# Patient Record
Sex: FEMALE | Race: WHITE | Employment: UNEMPLOYED | URBAN - METROPOLITAN AREA
[De-identification: names, ages, dates, MRNs, and addresses within clinical notes are randomized per-mention and may not be internally consistent; named-entity substitution may affect disease eponyms.]

---

## 2017-12-26 PROBLEM — Z34.01 ENCOUNTER FOR SUPERVISION OF NORMAL FIRST PREGNANCY IN FIRST TRIMESTER: Status: ACTIVE | Noted: 2017-12-26

## 2018-04-17 PROBLEM — O26.843 UTERINE SIZE DATE DISCREPANCY PREGNANCY, THIRD TRIMESTER: Status: ACTIVE | Noted: 2018-04-17

## 2018-07-03 ENCOUNTER — ANESTHESIA (OUTPATIENT)
Dept: LABOR AND DELIVERY | Age: 28
End: 2018-07-03
Payer: COMMERCIAL

## 2018-07-03 ENCOUNTER — ANESTHESIA EVENT (OUTPATIENT)
Dept: LABOR AND DELIVERY | Age: 28
End: 2018-07-03
Payer: COMMERCIAL

## 2018-07-03 ENCOUNTER — HOSPITAL ENCOUNTER (INPATIENT)
Age: 28
LOS: 3 days | Discharge: HOME | End: 2018-07-06
Attending: OBSTETRICS & GYNECOLOGY | Admitting: OBSTETRICS & GYNECOLOGY
Payer: COMMERCIAL

## 2018-07-03 PROBLEM — Z37.9 NORMAL LABOR: Status: ACTIVE | Noted: 2018-07-03

## 2018-07-03 LAB
ERYTHROCYTE [DISTWIDTH] IN BLOOD BY AUTOMATED COUNT: 15.5 % (ref 11.9–14.6)
HCT VFR BLD AUTO: 35.5 % (ref 35.8–46.3)
HGB BLD-MCNC: 11.2 G/DL (ref 11.7–15.4)
MCH RBC QN AUTO: 24.6 PG (ref 26.1–32.9)
MCHC RBC AUTO-ENTMCNC: 31.5 G/DL (ref 31.4–35)
MCV RBC AUTO: 77.9 FL (ref 79.6–97.8)
PLATELET # BLD AUTO: 184 K/UL (ref 150–450)
PMV BLD AUTO: 11.3 FL (ref 10.8–14.1)
RBC # BLD AUTO: 4.56 M/UL (ref 4.05–5.25)
WBC # BLD AUTO: 14 K/UL (ref 4.3–11.1)

## 2018-07-03 PROCEDURE — 86900 BLOOD TYPING SEROLOGIC ABO: CPT | Performed by: OBSTETRICS & GYNECOLOGY

## 2018-07-03 PROCEDURE — A4300 CATH IMPL VASC ACCESS PORTAL: HCPCS | Performed by: NURSE ANESTHETIST, CERTIFIED REGISTERED

## 2018-07-03 PROCEDURE — 77030014125 HC TY EPDRL BBMI -B: Performed by: NURSE ANESTHETIST, CERTIFIED REGISTERED

## 2018-07-03 PROCEDURE — 74011250636 HC RX REV CODE- 250/636

## 2018-07-03 PROCEDURE — 85027 COMPLETE CBC AUTOMATED: CPT | Performed by: OBSTETRICS & GYNECOLOGY

## 2018-07-03 PROCEDURE — 4A0HXCZ MEASUREMENT OF PRODUCTS OF CONCEPTION, CARDIAC RATE, EXTERNAL APPROACH: ICD-10-PCS | Performed by: OBSTETRICS & GYNECOLOGY

## 2018-07-03 PROCEDURE — 74011000250 HC RX REV CODE- 250

## 2018-07-03 PROCEDURE — 77030007880 HC KT SPN EPDRL BBMI -B: Performed by: NURSE ANESTHETIST, CERTIFIED REGISTERED

## 2018-07-03 PROCEDURE — 65270000029 HC RM PRIVATE

## 2018-07-03 RX ORDER — LIDOCAINE HYDROCHLORIDE 10 MG/ML
1 INJECTION INFILTRATION; PERINEURAL
Status: DISCONTINUED | OUTPATIENT
Start: 2018-07-03 | End: 2018-07-04 | Stop reason: HOSPADM

## 2018-07-03 RX ORDER — OXYTOCIN/0.9 % SODIUM CHLORIDE 15/250 ML
250 PLASTIC BAG, INJECTION (ML) INTRAVENOUS ONCE
Status: DISCONTINUED | OUTPATIENT
Start: 2018-07-04 | End: 2018-07-04

## 2018-07-03 RX ORDER — SODIUM CHLORIDE 0.9 % (FLUSH) 0.9 %
5-10 SYRINGE (ML) INJECTION AS NEEDED
Status: DISCONTINUED | OUTPATIENT
Start: 2018-07-03 | End: 2018-07-04

## 2018-07-03 RX ORDER — SODIUM CHLORIDE 0.9 % (FLUSH) 0.9 %
5-10 SYRINGE (ML) INJECTION EVERY 8 HOURS
Status: DISCONTINUED | OUTPATIENT
Start: 2018-07-04 | End: 2018-07-04

## 2018-07-03 RX ORDER — ONDANSETRON 2 MG/ML
4 INJECTION INTRAMUSCULAR; INTRAVENOUS
Status: DISCONTINUED | OUTPATIENT
Start: 2018-07-03 | End: 2018-07-04 | Stop reason: HOSPADM

## 2018-07-03 RX ORDER — MINERAL OIL
120 OIL (ML) ORAL
Status: COMPLETED | OUTPATIENT
Start: 2018-07-03 | End: 2018-07-04

## 2018-07-03 RX ORDER — DEXTROSE, SODIUM CHLORIDE, SODIUM LACTATE, POTASSIUM CHLORIDE, AND CALCIUM CHLORIDE 5; .6; .31; .03; .02 G/100ML; G/100ML; G/100ML; G/100ML; G/100ML
125 INJECTION, SOLUTION INTRAVENOUS CONTINUOUS
Status: DISCONTINUED | OUTPATIENT
Start: 2018-07-04 | End: 2018-07-04

## 2018-07-03 RX ORDER — BUTORPHANOL TARTRATE 1 MG/ML
1 INJECTION INTRAMUSCULAR; INTRAVENOUS
Status: DISCONTINUED | OUTPATIENT
Start: 2018-07-03 | End: 2018-07-04 | Stop reason: HOSPADM

## 2018-07-03 RX ORDER — LIDOCAINE HYDROCHLORIDE 20 MG/ML
JELLY TOPICAL
Status: DISCONTINUED | OUTPATIENT
Start: 2018-07-03 | End: 2018-07-04 | Stop reason: HOSPADM

## 2018-07-03 RX ORDER — FENTANYL CITRATE 50 UG/ML
INJECTION, SOLUTION INTRAMUSCULAR; INTRAVENOUS
Status: COMPLETED
Start: 2018-07-03 | End: 2018-07-03

## 2018-07-03 RX ADMIN — FENTANYL CITRATE 15 MCG: 50 INJECTION, SOLUTION INTRAMUSCULAR; INTRAVENOUS at 23:59

## 2018-07-03 NOTE — IP AVS SNAPSHOT
Armaan Wagner 58 [address] 
[phone] Patient: Basil Pinto MRN: [id number] VI About your hospitalization You were admitted on:  July 3, 2018 You last received care in the:  [address] [facility] You were discharged on:  2018 Why you were hospitalized Your primary diagnosis was:  Not on File Your diagnoses also included:  Normal Labor Follow-up Information Follow up With Details Comments Contact Info None   None ([phone]) Patient stated that they have no PCP Mehrdad Ellis MD Schedule an appointment as soon as possible for a visit in 6 weeks  [facility] [address] [facility] [address] 
[phone] Discharge Orders None A check sandra indicates which time of day the medication should be taken. My Medications CONTINUE taking these medications Instructions Each Dose to Equal  
 Morning Noon Evening Bedtime PRENATAL DHA+COMPLETE PRENATAL -300 mg-mcg-mg Cmpk Generic drug:  [id number] omar-folic-dha Your last dose was: Your next dose is: Take  by mouth. VITAMIN B-6 25 mg tablet Generic drug:  pyridoxine (vitamin B6) Your last dose was: Your next dose is: Take 25 mg by mouth daily. 25 mg Discharge Instructions Discharge instruction to follow: Activity: Pelvis rest for 6 weeks No heavy lifting over 15 lbs for 2 weeks No driving for 2 weeks No push/pull motion such as sweeping or vacuuming for 2 weeks No tub baths for 6 weeks Continue using the hygenique wand after each void or bowel movement. If using sitz bath continue until comfortable stopping. If using anthony-bottle continue to use until comfortable stopping. Change sanitary pad after each urination or bowel movement. Call MD for the following: Fever over 101 F; pain not relieved by medication; foul smelling vaginal discharge or an increase in vaginal bleeding. Take medication as prescribed. Follow up with MD as order. After Your Delivery (the Postpartum Period): Care Instructions Your Care Instructions Congratulations on the birth of your baby. Like pregnancy, the  period can be a time of excitement, mohamud, and exhaustion. You may look at your wondrous little baby and feel happy. You may also be overwhelmed by your new sleep hours and new responsibilities. At first, babies often sleep during the days and are awake at night. They do not have a pattern or routine. They may make sudden gasps, jerk themselves awake, or look like they have crossed eyes. These are all normal, and they may even make you smile. In these first weeks after delivery, try to take good care of yourself. It may take 4 to 6 weeks to feel like yourself again, and possibly longer if you had a  birth. You will likely feel very tired for several weeks. Your days will be full of ups and downs, but lots of mohamud as well. Follow-up care is a key part of your treatment and safety. Be sure to make and go to all appointments, and call your doctor if you are having problems. It's also a good idea to know your test results and keep a list of the medicines you take. How can you care for yourself at home? Take care of your body after delivery · Use pads instead of tampons for the bloody flow that may last as long as 2 weeks. · Ease cramps with ibuprofen (Advil, Motrin). · Ease soreness of hemorrhoids and the area between your vagina and rectum with ice compresses or witch hazel pads. · Ease constipation by drinking lots of fluid and eating high-fiber foods. Ask your doctor about over-the-counter stool softeners. · Cleanse yourself with a gentle squeeze of warm water from a bottle instead of wiping with toilet paper. · Take a sitz bath in warm water several times a day. · Wear a good nursing bra. Ease sore and swollen breasts with warm, wet washcloths. · If you are not breastfeeding, use ice rather than heat for breast soreness. · Your period may not start for several months if you are breastfeeding. You may bleed more, and longer at first, than you did before you got pregnant. · Wait until you are healed (about 4 to 6 weeks) before you have sexual intercourse. Your doctor will tell you when it is okay to have sex. · Try not to travel with your baby for 5 or 6 weeks. If you take a long car trip, make frequent stops to walk around and stretch. Avoid exhaustion · Rest every day. Try to nap when your baby naps. · Ask another adult to be with you for a few days after delivery. · Plan for  if you have other children. · Stay flexible so you can eat at odd hours and sleep when you need to. Both you and your baby are making new schedules. · Plan small trips to get out of the house. Change can make you feel less tired. · Ask for help with housework, cooking, and shopping. Remind yourself that your job is to care for your baby. Know about help for postpartum depression · \"Baby blues\" are common for the first 1 to 2 weeks after birth. You may cry or feel sad or irritable for no reason. · Rest whenever you can. Being tired makes it harder to handle your emotions. · Go for walks with your baby. · Talk to your partner, friends, and family about your feelings. · If your symptoms last for more than a few weeks, or if you feel very depressed, ask your doctor for help. · Postpartum depression can be treated. Support groups and counseling can help. Sometimes medicine can also help. Stay healthy · Eat healthy foods so you have more energy, make good breast milk, and lose extra baby pounds. · If you breastfeed, avoid alcohol and drugs. Stay smoke-free. If you quit during pregnancy, congratulations. · Start daily exercise after 4 to 6 weeks, but rest when you feel tired. · Learn exercises to tone your belly. Do Kegel exercises to regain strength in your pelvic muscles. You can do these exercises while you stand or sit. ¨ Squeeze the same muscles you would use to stop your urine. Your belly and thighs should not move. ¨ Hold the squeeze for 3 seconds, and then relax for 3 seconds. ¨ Start with 3 seconds. Then add 1 second each week until you are able to squeeze for 10 seconds. ¨ Repeat the exercise 10 to 15 times for each session. Do three or more sessions each day. · Find a class for new mothers and new babies that has an exercise time. · If you had a  birth, give yourself a bit more time before you exercise, and be careful. When should you call for help? Call 911 anytime you think you may need emergency care. For example, call if: 
? · You passed out (lost consciousness). ?Call your doctor now or seek immediate medical care if: 
? · You have severe vaginal bleeding. This means you are passing blood clots and soaking through a pad each hour for 2 or more hours. ? · You are dizzy or lightheaded, or you feel like you may faint. ? · You have a fever. ? · You have new belly pain, or your pain gets worse. ? Watch closely for changes in your health, and be sure to contact your doctor if: 
? · Your vaginal bleeding seems to be getting heavier. ? · You have new or worse vaginal discharge. ? · You feel sad, anxious, or hopeless for more than a few days. ? · You do not get better as expected. Where can you learn more? Go to http://angelia-eric.info/. Enter A461 in the search box to learn more about \"After Your Delivery (the Postpartum Period): Care Instructions. \" Current as of: 2017 Content Version: 11.4 © 4285-5320 [employer].  Care instructions adapted under license by [employer] (which disclaims liability or warranty for this information). If you have questions about a medical condition or this instruction, always ask your healthcare professional. [address] any warranty or liability for your use of this information. Z2 Announcement We are excited to announce that we are making your provider's discharge notes available to you in Z2. You will see these notes when they are completed and signed by the physician that discharged you from your recent hospital stay. If you have any questions or concerns about any information you see in Z2, please call the Health Information Department where you were seen or reach out to your Primary Care Provider for more information about your plan of care. Introducing [facility]! Deakelley Weiss: 
Thank you for requesting a Z2 account. Our records indicate that you already have an active Z2 account. You can access your account anytime at [email or web address] Did you know that you can access your hospital and ER discharge instructions at any time in Z2? You can also review all of your test results from your hospital stay or ER visit. Additional Information If you have questions, please visit the Frequently Asked Questions section of the Z2 website at [email or web address] Remember, Z2 is NOT to be used for urgent needs. For medical emergencies, dial 911. Now available from your iPhone and Android! Introducing Juan David Chou As a Terrence Henriquez patient, I wanted to make you aware of our electronic visit tool called Juan David Chou. Zainhira Griffinmilo 24/7 allows you to connect within minutes with a medical provider 24 hours a day, seven days a week via a mobile device or tablet or logging into a secure website from your computer. You can access Juan David Chou from anywhere in the [place]. A virtual visit might be right for you when you have a simple condition and feel like you just dont want to get out of bed, or cant get away from work for an appointment, when your regular [facility] provider is not available (evenings, weekends or holidays), or when youre out of town and need minor care. Electronic visits cost only $49 and if the [employer] 24/seedtag provider determines a prescription is needed to treat your condition, one can be electronically transmitted to a nearby pharmacy*. Please take a moment to enroll today if you have not already done so. The enrollment process is free and takes just a few minutes. To enroll, please download the Useful at Night yaniv to your tablet or phone, or visit [email or web address] to enroll on your computer. And, as an [address] [place] [address] patient with a [employer] account, the results of your visits will be scanned into your electronic medical record and your primary care provider will be able to view the scanned results. We urge you to continue to see your regular [facility] provider for your ongoing medical care. And while your primary care provider may not be the one available when you seek a Juan David Chou virtual visit, the peace of mind you get from getting a real diagnosis real time can be priceless. For more information on Juan David Chou, view our Frequently Asked Questions (FAQs) at [email or web address]. Sincerely, 
 
Dorene Payne MD 
Chief Medical Officer Juli Perla Martinez *:  certain medications cannot be prescribed via Juan David Chou Providers Seen During Your Hospitalization Provider Specialty Primary office phone Travon Donaldson MD Obstetrics & Gynecology [phone] Immunizations Administered for This Admission Name Date Tdap  Deferred () Your Primary Care Physician (PCP) Primary Care Physician Office Phone Office Fax  NONE ** None ** ** None **  
  
 You are allergic to the following Allergen Reactions Amoxicillin Hives Recent Documentation Height Breastfeeding? OB Status Smoking Status 1.727 m Unknown Recent pregnancy Never Smoker Emergency Contacts Name Discharge Info Relation Home Work Mobile Regi Guzman  Spouse [9] [phone] Patient Belongings The following personal items are in your possession at time of discharge: 
     Visual Aid: None             Clothing: Jacket/Coat, Pajamas, At bedside, Pants, Undergarments, Footwear, Shirt, With patient, Diann Pisano Please provide this summary of care documentation to your next provider. Signatures-by signing, you are acknowledging that this After Visit Summary has been reviewed with you and you have received a copy. Patient Signature:  ____________________________________________________________ Date:  ____________________________________________________________  
  
Bimal Deutsch Provider Signature:  ____________________________________________________________ Date:  ____________________________________________________________

## 2018-07-04 LAB
ABO + RH BLD: NORMAL
BLOOD GROUP ANTIBODIES SERPL: NORMAL
SPECIMEN EXP DATE BLD: NORMAL

## 2018-07-04 PROCEDURE — 77030009363 HC CUP VAC EXTRCT CNCI -B

## 2018-07-04 PROCEDURE — 75410000000 HC DELIVERY VAGINAL/SINGLE

## 2018-07-04 PROCEDURE — 75410000003 HC RECOV DEL/VAG/CSECN EA 0.5 HR: Performed by: OBSTETRICS & GYNECOLOGY

## 2018-07-04 PROCEDURE — 99283 EMERGENCY DEPT VISIT LOW MDM: CPT

## 2018-07-04 PROCEDURE — 77030018846 HC SOL IRR STRL H20 ICUM -A

## 2018-07-04 PROCEDURE — 65270000029 HC RM PRIVATE

## 2018-07-04 PROCEDURE — 75410000002 HC LABOR FEE PER 1 HR

## 2018-07-04 PROCEDURE — 0DQR0ZZ REPAIR ANAL SPHINCTER, OPEN APPROACH: ICD-10-PCS | Performed by: OBSTETRICS & GYNECOLOGY

## 2018-07-04 PROCEDURE — 77030003028 HC SUT VCRL J&J -A

## 2018-07-04 PROCEDURE — 74011250637 HC RX REV CODE- 250/637: Performed by: OBSTETRICS & GYNECOLOGY

## 2018-07-04 PROCEDURE — 36415 COLL VENOUS BLD VENIPUNCTURE: CPT | Performed by: OBSTETRICS & GYNECOLOGY

## 2018-07-04 PROCEDURE — 59025 FETAL NON-STRESS TEST: CPT

## 2018-07-04 PROCEDURE — 76060000078 HC EPIDURAL ANESTHESIA

## 2018-07-04 PROCEDURE — 84295 ASSAY OF SERUM SODIUM: CPT

## 2018-07-04 PROCEDURE — 77030011945 HC CATH URIN INT ST MENT -A

## 2018-07-04 PROCEDURE — 75410000003 HC RECOV DEL/VAG/CSECN EA 0.5 HR

## 2018-07-04 PROCEDURE — 82947 ASSAY GLUCOSE BLOOD QUANT: CPT

## 2018-07-04 PROCEDURE — 77010026065 HC OXYGEN MINIMUM MEDICAL AIR

## 2018-07-04 PROCEDURE — 82803 BLOOD GASES ANY COMBINATION: CPT

## 2018-07-04 RX ORDER — OXYTOCIN/0.9 % SODIUM CHLORIDE 15/250 ML
250 PLASTIC BAG, INJECTION (ML) INTRAVENOUS ONCE
Status: ACTIVE | OUTPATIENT
Start: 2018-07-04 | End: 2018-07-04

## 2018-07-04 RX ORDER — ZOLPIDEM TARTRATE 5 MG/1
10 TABLET ORAL
Status: DISCONTINUED | OUTPATIENT
Start: 2018-07-04 | End: 2018-07-04

## 2018-07-04 RX ORDER — HYDROCODONE BITARTRATE AND ACETAMINOPHEN 5; 325 MG/1; MG/1
1 TABLET ORAL
Status: DISCONTINUED | OUTPATIENT
Start: 2018-07-04 | End: 2018-07-07 | Stop reason: HOSPADM

## 2018-07-04 RX ORDER — POLYETHYLENE GLYCOL 3350 17 G/17G
17 POWDER, FOR SOLUTION ORAL DAILY
Status: DISCONTINUED | OUTPATIENT
Start: 2018-07-04 | End: 2018-07-07 | Stop reason: HOSPADM

## 2018-07-04 RX ORDER — DIPHENHYDRAMINE HCL 25 MG
25 CAPSULE ORAL
Status: DISCONTINUED | OUTPATIENT
Start: 2018-07-04 | End: 2018-07-07 | Stop reason: HOSPADM

## 2018-07-04 RX ORDER — DIPHENHYDRAMINE HCL 25 MG
50 CAPSULE ORAL
Status: DISCONTINUED | OUTPATIENT
Start: 2018-07-04 | End: 2018-07-07 | Stop reason: HOSPADM

## 2018-07-04 RX ORDER — IBUPROFEN 800 MG/1
800 TABLET ORAL
Status: DISCONTINUED | OUTPATIENT
Start: 2018-07-04 | End: 2018-07-07 | Stop reason: HOSPADM

## 2018-07-04 RX ORDER — ROPIVACAINE HYDROCHLORIDE 2 MG/ML
INJECTION, SOLUTION EPIDURAL; INFILTRATION; PERINEURAL
Status: DISCONTINUED | OUTPATIENT
Start: 2018-07-04 | End: 2018-07-04 | Stop reason: HOSPADM

## 2018-07-04 RX ORDER — FENTANYL CITRATE 50 UG/ML
INJECTION, SOLUTION INTRAMUSCULAR; INTRAVENOUS AS NEEDED
Status: DISCONTINUED | OUTPATIENT
Start: 2018-07-03 | End: 2018-07-04 | Stop reason: HOSPADM

## 2018-07-04 RX ORDER — SIMETHICONE 80 MG
80 TABLET,CHEWABLE ORAL
Status: DISCONTINUED | OUTPATIENT
Start: 2018-07-04 | End: 2018-07-07 | Stop reason: HOSPADM

## 2018-07-04 RX ORDER — DOCUSATE SODIUM 100 MG/1
100 CAPSULE, LIQUID FILLED ORAL 2 TIMES DAILY
Status: DISCONTINUED | OUTPATIENT
Start: 2018-07-04 | End: 2018-07-07 | Stop reason: HOSPADM

## 2018-07-04 RX ORDER — FENTANYL CITRATE 50 UG/ML
100 INJECTION, SOLUTION INTRAMUSCULAR; INTRAVENOUS ONCE
Status: DISCONTINUED | OUTPATIENT
Start: 2018-07-04 | End: 2018-07-04

## 2018-07-04 RX ORDER — LIDOCAINE HYDROCHLORIDE AND EPINEPHRINE 15; 5 MG/ML; UG/ML
INJECTION, SOLUTION EPIDURAL AS NEEDED
Status: DISCONTINUED | OUTPATIENT
Start: 2018-07-04 | End: 2018-07-04 | Stop reason: HOSPADM

## 2018-07-04 RX ORDER — ZOLPIDEM TARTRATE 5 MG/1
5 TABLET ORAL
Status: DISCONTINUED | OUTPATIENT
Start: 2018-07-04 | End: 2018-07-07 | Stop reason: HOSPADM

## 2018-07-04 RX ORDER — OXYTOCIN/RINGER'S LACTATE 15/250 ML
250 PLASTIC BAG, INJECTION (ML) INTRAVENOUS ONCE
Status: DISCONTINUED | OUTPATIENT
Start: 2018-07-04 | End: 2018-07-04 | Stop reason: SDUPTHER

## 2018-07-04 RX ORDER — NALOXONE HYDROCHLORIDE 0.4 MG/ML
0.4 INJECTION, SOLUTION INTRAMUSCULAR; INTRAVENOUS; SUBCUTANEOUS AS NEEDED
Status: DISCONTINUED | OUTPATIENT
Start: 2018-07-04 | End: 2018-07-07 | Stop reason: HOSPADM

## 2018-07-04 RX ORDER — DIPHENHYDRAMINE HCL 25 MG
25-50 CAPSULE ORAL
Status: DISCONTINUED | OUTPATIENT
Start: 2018-07-04 | End: 2018-07-04

## 2018-07-04 RX ORDER — HYDROCODONE BITARTRATE AND ACETAMINOPHEN 5; 325 MG/1; MG/1
2 TABLET ORAL
Status: DISCONTINUED | OUTPATIENT
Start: 2018-07-04 | End: 2018-07-07 | Stop reason: HOSPADM

## 2018-07-04 RX ADMIN — Medication 1 SPRAY: at 09:56

## 2018-07-04 RX ADMIN — DOCUSATE SODIUM 100 MG: 100 CAPSULE, LIQUID FILLED ORAL at 18:05

## 2018-07-04 RX ADMIN — HYDROCODONE BITARTRATE AND ACETAMINOPHEN 1 TABLET: 5; 325 TABLET ORAL at 23:06

## 2018-07-04 RX ADMIN — WITCH HAZEL 1 PAD: 500 SOLUTION RECTAL; TOPICAL at 09:56

## 2018-07-04 RX ADMIN — DOCUSATE SODIUM 100 MG: 100 CAPSULE, LIQUID FILLED ORAL at 09:57

## 2018-07-04 RX ADMIN — MINERAL OIL 120 ML: 471.95 OIL ORAL at 04:29

## 2018-07-04 RX ADMIN — IBUPROFEN 800 MG: 800 TABLET ORAL at 09:19

## 2018-07-04 RX ADMIN — ROPIVACAINE HYDROCHLORIDE 8 ML/HR: 2 INJECTION, SOLUTION EPIDURAL; INFILTRATION; PERINEURAL at 00:00

## 2018-07-04 RX ADMIN — FENTANYL CITRATE 85 MCG: 50 INJECTION, SOLUTION INTRAMUSCULAR; INTRAVENOUS at 00:00

## 2018-07-04 RX ADMIN — HYDROCODONE BITARTRATE AND ACETAMINOPHEN 1 TABLET: 5; 325 TABLET ORAL at 09:20

## 2018-07-04 RX ADMIN — IBUPROFEN 800 MG: 800 TABLET ORAL at 15:00

## 2018-07-04 RX ADMIN — LIDOCAINE HYDROCHLORIDE AND EPINEPHRINE 5 ML: 15; 5 INJECTION, SOLUTION EPIDURAL at 00:00

## 2018-07-04 RX ADMIN — POLYETHYLENE GLYCOL (3350) 17 G: 17 POWDER, FOR SOLUTION ORAL at 09:57

## 2018-07-04 NOTE — ANESTHESIA POSTPROCEDURE EVALUATION
Post-Anesthesia Evaluation and Assessment    Patient: Amanda Corea MRN: [id number]  SSN: [id number]    YOB: 1990  Age: 33 y.o. Sex: female       Cardiovascular Function/Vital Signs  Visit Vitals    /64    Pulse 80    Temp 37.1 °C (98.8 °F)    Resp 20    Ht 5' 8\" (1.727 m)    Breastfeeding No       Patient is status post CSE anesthesia for * No procedures listed *. Nausea/Vomiting: None    Postoperative hydration reviewed and adequate. Pain:  Pain Scale 1: Numeric (0 - 10) (07/03/18 2238)  Pain Intensity 1: 6 (07/03/18 2238)   Managed    Neurological Status: At baseline    Mental Status and Level of Consciousness: Arousable    Pulmonary Status:   O2 Device: Room air (07/03/18 2238)   Adequate oxygenation and airway patent    Complications related to anesthesia: None    Post-anesthesia assessment completed.  No concerns    Signed By: Anthony Moses MD     July 4, 2018

## 2018-07-04 NOTE — PROGRESS NOTES
Patient visiting with family. Patient encouraged to call out to RN if she needs anything for pain, help with breast feeding, has any questions, or needs anything else the RN can help her with or bring her. Patient verbalized understanding.

## 2018-07-04 NOTE — PROGRESS NOTES
Patient continues to with contraction while MD pulls with vacuum x3 pulls. Will continue to assess fetal heart tones.  Sushant and respiratory therapist at bedside to evaluate infant

## 2018-07-04 NOTE — PROGRESS NOTES
Dr. She Delgado called updated on patient status and cervical exam; new orders received patient to labor down reassess within 30 minutes for cervical exam

## 2018-07-04 NOTE — PROGRESS NOTES
Assisted up to bathroom, voided 800   Pericare performed. Dermoplast spray, and tucks pads applied.   Ice pack and pad changed  Doing well, sitting in rocking chair with spouse at bs

## 2018-07-04 NOTE — PROGRESS NOTES
Patient doing well, pain has decreased  Infant placed skin to skin and breastfeeding initiated  Paperwork reviewed with patient and spouse.   Shaken baby syndrome signed

## 2018-07-04 NOTE — PROGRESS NOTES
RN called to bedside reports large gush of fluid. Perineum assessed SROM noted.  Patient repositioned to left lateral on peanut birthing ball

## 2018-07-04 NOTE — PROGRESS NOTES
07/04/18 1459   Pain   Pain Scale 1 Numeric (0 - 10)   Pain Intensity 1 4   Pain Location 1 Perineum   Pain Orientation 1 Lower   Pain Description 1 Sore   Pain Intervention(s) 1 Medication (see MAR)   Motrin given, see MAR  Family visiting at

## 2018-07-04 NOTE — L&D DELIVERY NOTE
Delivery Summary    Patient: Kenny Castellon MRN: [id number]  SSN: [id number]    YOB: 1990  Age: 33 y.o. Sex: female        Information for the patient's :  Drew Agarwal [[id number]]       Labor Events:    Labor: No   Rupture Date: 2018   Rupture Time: 2:45 AM   Rupture Type SROM   Amniotic Fluid Volume: None    Amniotic Fluid Description: Clear None   Induction: None       Augmentation: None   Labor Events:       Cervical Ripening:     None     Delivery Events:  Episiotomy: None   Laceration(s): Partial third degree perineal     Repaired: Yes    Number of Repair Packets: 2   Suture Type and Size: Vicryl 2-0  Vicryl 3-0     Estimated Blood Loss (ml): 400ml       Delivery Date: 2018    Delivery Time: 6:23 AM  Delivery Type: Vaginal, Vacuum (Extractor)  Vacuum attempted? No    Vacuum indication:     Vacuum type: Kiwi   Application location: Low   First Attempt     Time applied:     Time removed:     Second Attempt    Time applied:     Time removed: Third Attempt    Time applied:     Time removed:     Number of pulls: 2   Number of pop-offs: 0   Low-end pressure range: 25   High-end pressure range: 570   Total application time: 7 minutes   Applied by: Garima Isaacs   Failed? 0   Sex:   Male     Gestational Age: 44w2d  Delivery Clinician:  Sharmila Westbrook  Living Status: Living   Delivery Location: L&D           APGARS  One minute Five minutes Ten minutes   Skin color: 1   1        Heart rate: 2   2        Grimace: 2   2        Muscle tone: 2   2        Breathin   2        Totals: 9   9          Presentation: Vertex    Position: Right Occiput Posterior  Resuscitation Method:        Meconium Stained:        Cord Vessels: 3 Vessels      Cord Events:    Cord Blood Sent?:  Yes    Blood Gases Sent?:  Yes    Placenta:  Date/Time:   6:29 AM  Removal: Spontaneous      Appearance: Normal;Intact     [place]fur Measurements:  Birth Weight: 7 lb 11.5 oz (3.5 kg)      Birth Length:        Head Circumference: 35.5 cm      Chest Circumference: 33.5 cm     Abdominal Girth: Other Providers:   Nasreen DEY;BERNADETTE YIN;CONSTANTINE MANCERA;PRISCILLA CHANG;NICKI HANCOCK;ARON WARD., Obstetrician;Primary Nurse;Charge Nurse;Neonatologist;Respiratory Therapist;Staff Nurse     The indication, risks, and benefits of vacuum delivery compared to  delivery were discussed with the patient and attendant family member. All questions were answered. Instrumentation easily achieved and positioning was checked and verified prior to attempt at deliver. Group B Strep:   Lab Results   Component Value Date/Time    GrBStrep, External Negative 2018     Information for the patient's :  Maryelizabeth Curling [[id number]]   No results found for: Rajan Marley, PCTDIG, BILI, ABORHEXT, ABORH    No results found for: APH, APCO2, APO2, AHCO3, ABEC, ABDC, O2ST, EPHV, PCO2V, PO2V, HCO3V, EBEV, EBDV, SITE, RSCOM      Delivery Note      No results found for: APH, APCO2, APO2, AHCO3, ABEC, ABDC, O2ST, SITE, RSCOM         Lab Results   Component Value Date/Time    Rubella, External 1.53 11/15/2017    GrBStrep, External Negative 2018            Procedure:  Patient became completely dilated and pushed for 2hr 45 min. She became fatigued. Head was at 2-3+. Discussed vacuum and it was applied. Episiotomy was not performed. Vacuum applied at 616. Pressure increased to 500 with pulls. Released to yellow when not pulling. Two pulls total.   Head delivered. Shoulders delivered without any evidence of dystocia. Baby delivered in the bed, was dried. The cord was clamped and cut. Cord pH and cord blood was obtained. The baby was taken to warmer for nicu. The perineum was examined. She had a partial 3rd. Sphincter was repaired with 2.0 vicryl and then remainder closed in routine fashion with 3.0 vicryl. The placenta was delivered spontaneously. It was examined.   It was intact with three vessels. Mom and baby are doing well.          Bren Rosenberg MD  7/4/2018  6:54 AM

## 2018-07-04 NOTE — PROGRESS NOTES
RN at bedside cervical exam performed +2 station noted; pushing with contractions reviewed with patient

## 2018-07-04 NOTE — H&P
History & Physical    Name: Amanda Corea MRN: [id number]  SSN: [id number]    YOB: 1990  Age: 33 y.o. Sex: female        Subjective: Pt presents with uterine ctx. She denies VB, LOF, or preeclamptic symptoms. Pt notes good FM. Estimated Date of Delivery: 18  OB History    Para Term  AB Living   1        SAB TAB Ectopic Molar Multiple Live Births              # Outcome Date GA Lbr Yadiel/2nd Weight Sex Delivery Anes PTL Lv   1 Current                   Ms. Elroy Zaldivar is seen with pregnancy at 39w1d for active labor. Prenatal course was normal.  the patients states that the baby moves as usual   Please see prenatal records for details. No past medical history on file. Past Surgical History:   Procedure Laterality Date    HX WISDOM TEETH EXTRACTION  2017     Social History     Occupational History    Not on file. Social History Main Topics    Smoking status: Never Smoker    Smokeless tobacco: Never Used    Alcohol use No    Drug use: No    Sexual activity: Yes     Partners: Male     Birth control/ protection: None     Family History   Problem Relation Age of Onset    Migraines Father     Alzheimer Maternal Grandfather     Heart Disease Maternal Grandfather     Lung Disease Maternal Grandfather     Thyroid Disease Paternal Grandmother     Colon Cancer Neg Hx     Ovarian Cancer Neg Hx     Breast Cancer Neg Hx        Allergies   Allergen Reactions    Amoxicillin Hives     Prior to Admission medications    Medication Sig Start Date End Date Taking? Authorizing Provider   [id number] omar-folic-dha (PRENATAL DHA+COMPLETE PRENATAL) [id number] mg-mcg-mg cmpk Take  by mouth. Yes Historical Provider   pyridoxine, vitamin B6, (VITAMIN B-6) 25 mg tablet Take 25 mg by mouth daily.     Historical Provider        Review of Systems:  Constitutional:No headache, fever  Cardiac:   No chest pain      Resp: No cough or shortness of breath     GI:   No nausea/vomiting, diarrhea, abdominal pain    :   No dysuria  Neuro:     No vision changes, headache      Objective:     Vitals:  Vitals:    18 2237 18 2238   BP:  117/67   Pulse:  74   Resp:  20   Temp:  98.1 °F (36.7 °C)   Height: 5' 8\" (1.727 m)         Physical Exam:  Patient with distress. Heart: Regular rate and rhythm  Lung: clear to auscultation throughout lung fields, no wheezes, no rales, no rhonchi and normal respiratory effort  Back: costovertebral angle tenderness absent  Abdomen: soft, nontender  Fundus: soft and non tender  Cervical Exam: 7cm/C/vtx/0 station  Lower Extremities: no evidence of DVT  Membranes:  Intact  Fetal Heart Rate: Baseline: 155 per minute  Variability: moderate  Accelerations: yes  Decelerations: none  Uterine contractions: regular, every 2-3 minutes    Prenatal Labs:   Lab Results   Component Value Date/Time    Rubella, External 1.53 11/15/2017    GrBStrep, External Negative 2018    HBsAg, External Negative 11/15/2017    RPR, External Non Reactive 11/15/2017         Assessment/Plan:     Ms. Jaret Joe is a  seen with pregnancy at 39w1d for active labor. Lab Results   Component Value Date/Time    GrBStrep, External Negative 2018       Plan:     Admit for labor management    Patient discussed with Dr. Nicole Pablo.

## 2018-07-04 NOTE — ANESTHESIA PREPROCEDURE EVALUATION
Anesthetic History   No history of anesthetic complications            Review of Systems / Medical History  Patient summary reviewed and pertinent labs reviewed    Pulmonary  Within defined limits                 Neuro/Psych   Within defined limits           Cardiovascular  Within defined limits                Exercise tolerance: >4 METS     GI/Hepatic/Renal     GERD: well controlled           Endo/Other  Within defined limits           Other Findings              Physical Exam    Airway  Mallampati: II  TM Distance: 4 - 6 cm  Neck ROM: normal range of motion   Mouth opening: Normal     Cardiovascular  Regular rate and rhythm,  S1 and S2 normal,  no murmur, click, rub, or gallop             Dental         Pulmonary  Breath sounds clear to auscultation               Abdominal         Other Findings            Anesthetic Plan    ASA: 2  Anesthesia type: CSE      Post-op pain plan if not by surgeon: intrathecal opiates, epidural opioid and indwelling epidural catheter      Anesthetic plan and risks discussed with: Patient and Spouse

## 2018-07-04 NOTE — PROGRESS NOTES
Pt admitted to Room 430. Consents obtained. Labs drawn. #18 jelco placed in left forearm. Labs sent downstairs. Reviewed plan of care with pt and . History obtained. GBS negative.  Pt requesting epidural

## 2018-07-04 NOTE — ANESTHESIA PROCEDURE NOTES
CSE Block    Start time: 7/4/2018 11:58 PM  End time: 7/4/2018 12:01 AM  Performed by: Vasquez Logan  Authorized by: Kathleen ADAM     Pre-Procedure  Indications: at surgeon's request and primary anesthetic    preanesthetic checklist: patient identified, risks and benefits discussed, anesthesia consent, site marked, patient being monitored and timeout performed    Timeout Time: 23:56        Procedure:   Patient Position:  Seated  Prep Region:  Lumbar  Prep: chlorhexidine    Location:  L2-3    Epidural Needle:   Needle Type:  Tuohy  Needle Gauge:  18 G  Injection Technique:  Loss of resistance using saline  Attempts:  1    Spinal Needle:   Needle Type:  Quincke  Needle Gauge:  25 G    Catheter:   Catheter Type:  Open end  Catheter Size:  20 G  Catheter at Skin Depth (cm):  5  Depth in Epidural Space (cm):  5  Events: no blood with aspiration, no cerebrospinal fluid with aspiration, no paresthesia and negative aspiration test    Test Dose:  Lidocaine 1.5% w/ epi and negative    Assessment:   Catheter Secured:  Tegaderm and tape  Insertion:  Uncomplicated  Patient tolerance:  Patient tolerated the procedure well with no immediate complications

## 2018-07-04 NOTE — PROGRESS NOTES
Pt arrived at [facility] with complaints of severe pain and discomfort. Been raúl since 0430am.    Dr. Sheri Leal made aware.  Placed on [facility]

## 2018-07-04 NOTE — PROGRESS NOTES
MD remains at bedside pushing with patient; strip reviewed.  Patient continues to push with every contraction

## 2018-07-04 NOTE — PROGRESS NOTES
Up to bathroom, voided without difficulty  Pericare performed  OB pads, gown, and ice pack changed  Assisted to wheelchair for transfer to room 441

## 2018-07-04 NOTE — PROGRESS NOTES
Patient repositioned to left tilt on peanut birthing ball.  Lights dimmed patient encouraged to rest call bell in reach

## 2018-07-04 NOTE — PROGRESS NOTES
07/04/18 0036   Cervical Exam   Dilation (cm) 8   Eff 100 %   Station 0   Position Anterior   Cervical Consistency Soft   Vaginal exam done by?   Michell Carballo RN   Baby Position Vertex

## 2018-07-04 NOTE — PROGRESS NOTES
Keith Vasques at bedside at [address] [facility]. Assisted pt to sitting up on bedside at 2350. Timeout completed at 200 with MD, AINSLEY and myself at bedside. Test dose given at 0000. Negative reaction. Dose given at 0002. Pt assisted to lying back in left tilt position. See anesthesia record for details. See vital sign flow sheet for BP. Tolerated procedure well.

## 2018-07-04 NOTE — PROGRESS NOTES
1x pull with vacuum while patient pushes with contraction.  viable baby boy; spontaneous cry.  Cord clamped and cut transferred to radiant warmer for further evaluation

## 2018-07-05 PROCEDURE — 74011250637 HC RX REV CODE- 250/637: Performed by: OBSTETRICS & GYNECOLOGY

## 2018-07-05 PROCEDURE — 65270000029 HC RM PRIVATE

## 2018-07-05 RX ADMIN — IBUPROFEN 800 MG: 800 TABLET ORAL at 15:09

## 2018-07-05 RX ADMIN — IBUPROFEN 800 MG: 800 TABLET ORAL at 23:00

## 2018-07-05 RX ADMIN — DOCUSATE SODIUM 100 MG: 100 CAPSULE, LIQUID FILLED ORAL at 23:00

## 2018-07-05 RX ADMIN — POLYETHYLENE GLYCOL (3350) 17 G: 17 POWDER, FOR SOLUTION ORAL at 08:52

## 2018-07-05 RX ADMIN — DOCUSATE SODIUM 100 MG: 100 CAPSULE, LIQUID FILLED ORAL at 08:52

## 2018-07-05 RX ADMIN — IBUPROFEN 800 MG: 800 TABLET ORAL at 08:52

## 2018-07-05 NOTE — PROGRESS NOTES
Shift assessment complete. Sitz bath brought to room and instructed on. Meds given, no further needs.

## 2018-07-05 NOTE — LACTATION NOTE
In to see mom and infant for the first time. Mom stated that infant has been latching and nursing well. Infant was awake and cueing and I offered to observe/assist with a feeding. Mom placed infant to the right breast and infant latched and sucked rhythmically for about five minutes and fell asleep. Mom placed him to the left breast in the cross cradle hold and he latched and nursed for 5 minutes and fell asleep. Mom stated that infant had cluster fed during the night and then he was circumcised this am and has been sleepy most of the day. Informed mom that he may cluster feed again tonight. Lactation consultant will follow up tomorrow.

## 2018-07-05 NOTE — PROGRESS NOTES
RN called to room for help getting baby latched on to breast. Baby crying and patient sitting in rocking chair. Patient stated that the baby is sucking on his had and she can not get him latched to her breast. RN helped patient reposition baby moving his arms around her breast and taught the patient how to hand express some colostrum and offer it to baby's open mouth. Baby latched on correctly at once and stayed latched on with a vigorous suck.

## 2018-07-05 NOTE — PROGRESS NOTES
Post-Partum Day Number 1 Progress Note  Peyman Tam  [id number]    Patient doing well post-partum without significant complaint. Voiding without difficulty, normal lochia. Vitals:  Patient Vitals for the past 8 hrs:   BP Temp Pulse Resp SpO2   18 0644 101/62 98.4 °F (36.9 °C) 77 16 97 %     Temp (24hrs), Av.6 °F (37 °C), Min:98.4 °F (36.9 °C), Max:98.8 °F (37.1 °C)      Vital signs stable, afebrile. Exam:  Patient without distress. Abdomen soft, fundus firm at level of umbilicus, nontender               Labs: No results found for this or any previous visit (from the past 24 hour(s)). Assessment and Plan:  Patient appears to be having uncomplicated post-partum course. Continue routine perineal care and maternal education. Plan discharge tomorrow if no problems occur.

## 2018-07-05 NOTE — PROGRESS NOTES
RN assisted with getting baby latched on to left breast. Patient requests pain medication for bed. RN gave patient ice pack for vaginal swelling.

## 2018-07-05 NOTE — LACTATION NOTE

## 2018-07-05 NOTE — PROGRESS NOTES
Chart reviewed due to first time parent - no needs identified.  met with family and provided education on [facility] [place] [facility] Visit [facility].  Family declined referral for home visit.  provided informational packet on  mood disorder education/resources. Family receptive to receiving information and denied any additional needs from . Family has this 's contact information should any needs/questions arise.     Ella [facility], [address] [facility]

## 2018-07-05 NOTE — PROGRESS NOTES
Report of care received from, [facility].  Bedside report given, pt denies further needs at present time

## 2018-07-06 VITALS
RESPIRATION RATE: 16 BRPM | SYSTOLIC BLOOD PRESSURE: 102 MMHG | HEART RATE: 87 BPM | DIASTOLIC BLOOD PRESSURE: 61 MMHG | HEIGHT: 68 IN | OXYGEN SATURATION: 99 % | TEMPERATURE: 98 F

## 2018-07-06 PROCEDURE — 74011250637 HC RX REV CODE- 250/637: Performed by: OBSTETRICS & GYNECOLOGY

## 2018-07-06 RX ADMIN — DOCUSATE SODIUM 100 MG: 100 CAPSULE, LIQUID FILLED ORAL at 09:18

## 2018-07-06 RX ADMIN — IBUPROFEN 800 MG: 800 TABLET ORAL at 09:18

## 2018-07-06 NOTE — PROGRESS NOTES
Post-Partum Day Number 2 Progress/Discharge Note    Patient doing well post-partum without significant complaint. Voiding without difficulty, normal lochia, positive flatus. Vitals:  Patient Vitals for the past 8 hrs:   BP Temp Pulse Resp SpO2   18 0714 102/61 98 °F (36.7 °C) 87 16 99 %     Temp (24hrs), Av.1 °F (36.7 °C), Min:98 °F (36.7 °C), Max:98.1 °F (36.7 °C)      Vital signs stable, afebrile. Exam:  Patient without distress. Abdomen soft, fundus firm at level of umbilicus, non tender               Perineum with normal lochia noted. Lower extremities are negative for swelling, cords or tenderness. Lab/Data Review: All lab results for the last 24 hours reviewed. Lab Results   Component Value Date/Time    ABO/Rh(D) A POSITIVE 2018 11:30 PM    Antibody screen, External Negative 11/15/2017    Antibody screen NEG 2018 11:30 PM    Rubella, External 1.53 11/15/2017    GrBStrep, External Negative 2018    HBsAg, External Negative 11/15/2017    RPR, External Non Reactive 11/15/2017    ABO,Rh A Positive 11/15/2017          Problem List  Date Reviewed: 7/3/2018          Codes Class Noted    Normal labor ICD-10-CM: O80, Z37.9  ICD-9-CM: 731  7/3/2018        Uterine size date discrepancy pregnancy, third trimester ICD-10-CM: O26.843  ICD-9-CM: 649.63  2018    Overview Signed 2018  9:16 AM by Evelyn Marley MD     Measuring small at 28 wks, will check growth at 30 wks. Pt very petite. Encounter for supervision of normal first pregnancy in first trimester ICD-10-CM: Z34.01  ICD-9-CM: V22.0  2017            Assessment and Plan:  Patient appears to be having uncomplicated post-partum course. Continue routine perineal care and maternal education. Plan discharge for today with follow up in our office in 6 weeks. Rh positive, rubella +  Breastfeeding strategies reviewed.    SIDs  precautions reviewed    Recent Labs      07/03/18   2330   HGB  11.2*   PLT  184     D/w pt:  Infx/driving/physical activity/pelvic rest precautions

## 2018-07-06 NOTE — LACTATION NOTE
This note was copied from a baby's chart. Mom and baby going home today. Mom reports feedings are going well. Nipples tender per mom. No visible damage per mom. Using lanolin. Cluster fed last night. Reviewed normalcy of cluster feeding. Reviewed need for minimum of 8 feedings in 24 hours. Watch output. Reviewed jaundice, weight expectations, and engorgement. Questions answered regarding rental pump. Declined at this time. Pump should arrive from insurance with in 6 days per dad. Discussed outpatient resources if needed. Encouraged to call with needs.

## 2018-07-06 NOTE — DISCHARGE INSTRUCTIONS
Discharge instruction to follow: Activity: Pelvis rest for 6 weeks     No heavy lifting over 15 lbs for 2 weeks     No driving for 2 weeks     No push/pull motion such as sweeping or vacuuming for 2 weeks     No tub baths for 6 weeks    Continue using the hygenique wand after each void or bowel movement. If using sitz bath continue until comfortable stopping. If using anthony-bottle continue to use until comfortable stopping. Change sanitary pad after each urination or bowel movement. Call MD for the following:      Fever over 101 F; pain not relieved by medication; foul smelling vaginal discharge or an increase in vaginal bleeding. Take medication as prescribed. Follow up with MD as order. After Your Delivery (the Postpartum Period): Care Instructions  Your Care Instructions    Congratulations on the birth of your baby. Like pregnancy, the  period can be a time of excitement, mohamud, and exhaustion. You may look at your wondrous little baby and feel happy. You may also be overwhelmed by your new sleep hours and new responsibilities. At first, babies often sleep during the days and are awake at night. They do not have a pattern or routine. They may make sudden gasps, jerk themselves awake, or look like they have crossed eyes. These are all normal, and they may even make you smile. In these first weeks after delivery, try to take good care of yourself. It may take 4 to 6 weeks to feel like yourself again, and possibly longer if you had a  birth. You will likely feel very tired for several weeks. Your days will be full of ups and downs, but lots of mohamud as well. Follow-up care is a key part of your treatment and safety. Be sure to make and go to all appointments, and call your doctor if you are having problems. It's also a good idea to know your test results and keep a list of the medicines you take. How can you care for yourself at home?   Take care of your body after delivery  · Use pads instead of tampons for the bloody flow that may last as long as 2 weeks. · Ease cramps with ibuprofen (Advil, Motrin). · Ease soreness of hemorrhoids and the area between your vagina and rectum with ice compresses or witch hazel pads. · Ease constipation by drinking lots of fluid and eating high-fiber foods. Ask your doctor about over-the-counter stool softeners. · Cleanse yourself with a gentle squeeze of warm water from a bottle instead of wiping with toilet paper. · Take a sitz bath in warm water several times a day. · Wear a good nursing bra. Ease sore and swollen breasts with warm, wet washcloths. · If you are not breastfeeding, use ice rather than heat for breast soreness. · Your period may not start for several months if you are breastfeeding. You may bleed more, and longer at first, than you did before you got pregnant. · Wait until you are healed (about 4 to 6 weeks) before you have sexual intercourse. Your doctor will tell you when it is okay to have sex. · Try not to travel with your baby for 5 or 6 weeks. If you take a long car trip, make frequent stops to walk around and stretch. Avoid exhaustion  · Rest every day. Try to nap when your baby naps. · Ask another adult to be with you for a few days after delivery. · Plan for  if you have other children. · Stay flexible so you can eat at odd hours and sleep when you need to. Both you and your baby are making new schedules. · Plan small trips to get out of the house. Change can make you feel less tired. · Ask for help with housework, cooking, and shopping. Remind yourself that your job is to care for your baby. Know about help for postpartum depression  · \"Baby blues\" are common for the first 1 to 2 weeks after birth. You may cry or feel sad or irritable for no reason. · Rest whenever you can. Being tired makes it harder to handle your emotions. · Go for walks with your baby.   · Talk to your partner, friends, and family about your feelings. · If your symptoms last for more than a few weeks, or if you feel very depressed, ask your doctor for help. · Postpartum depression can be treated. Support groups and counseling can help. Sometimes medicine can also help. Stay healthy  · Eat healthy foods so you have more energy, make good breast milk, and lose extra baby pounds. · If you breastfeed, avoid alcohol and drugs. Stay smoke-free. If you quit during pregnancy, congratulations. · Start daily exercise after 4 to 6 weeks, but rest when you feel tired. · Learn exercises to tone your belly. Do Kegel exercises to regain strength in your pelvic muscles. You can do these exercises while you stand or sit. ¨ Squeeze the same muscles you would use to stop your urine. Your belly and thighs should not move. ¨ Hold the squeeze for 3 seconds, and then relax for 3 seconds. ¨ Start with 3 seconds. Then add 1 second each week until you are able to squeeze for 10 seconds. ¨ Repeat the exercise 10 to 15 times for each session. Do three or more sessions each day. · Find a class for new mothers and new babies that has an exercise time. · If you had a  birth, give yourself a bit more time before you exercise, and be careful. When should you call for help? Call 911 anytime you think you may need emergency care. For example, call if:  ? · You passed out (lost consciousness). ?Call your doctor now or seek immediate medical care if:  ? · You have severe vaginal bleeding. This means you are passing blood clots and soaking through a pad each hour for 2 or more hours. ? · You are dizzy or lightheaded, or you feel like you may faint. ? · You have a fever. ? · You have new belly pain, or your pain gets worse. ? Watch closely for changes in your health, and be sure to contact your doctor if:  ? · Your vaginal bleeding seems to be getting heavier. ? · You have new or worse vaginal discharge.    ? · You feel sad, anxious, or hopeless for more than a few days. ? · You do not get better as expected. Where can you learn more? Go to http://angelia-eric.info/. Enter A461 in the search box to learn more about \"After Your Delivery (the Postpartum Period): Care Instructions. \"  Current as of: March 16, 2017  Content Version: 11.4  © 9064-7841 [employer]. Care instructions adapted under license by [employer] (which disclaims liability or warranty for this information). If you have questions about a medical condition or this instruction, always ask your healthcare professional. [address] any warranty or liability for your use of this information.

## 2018-07-06 NOTE — LACTATION NOTE

## 2018-07-06 NOTE — DISCHARGE SUMMARY
Obstetrical Discharge Summary     Name: Griffin Douglas MRN: [id number]  SSN: [id number]    YOB: 1990  Age: 33 y.o. Sex: female      Allergies: Amoxicillin    Admit Date: 7/3/2018    Discharge Date: 2018     Admitting Physician: Stan Ley MD     Attending Physician:  Shauna Lawrence MD     * Admission Diagnoses: Triage;Normal labor    * Discharge Diagnoses:   Information for the patient's :  Deejay Azul [[id number]]   Delivery of a 7 lb 11.5 oz (3.5 kg) male infant via Vaginal, Vacuum (Extractor) on 2018 at 6:23 AM  by . Apgars were 9 and 9. Additional Diagnoses:   Hospital Problems as of 2018  Date Reviewed: 7/3/2018          Codes Class Noted - Resolved POA    Normal labor ICD-10-CM: O80, Z37.9  ICD-9-CM: 707  7/3/2018 - Present Unknown             Lab Results   Component Value Date/Time    ABO/Rh(D) A POSITIVE 2018 11:30 PM    Rubella, External 1.53 11/15/2017    GrBStrep, External Negative 2018    ABO,Rh A Positive 11/15/2017      Immunization History   Administered Date(s) Administered    Influenza Vaccine (Quad) Mdck Pf 2017       * Procedures:  VAVD    * No surgery found *      [place]  Depression Scale  I have been able to laugh and see the funny side of things: As much as I always could  I have looked forward with enjoyment to things: As much as I ever did  I have blamed myself unnecessarily when things went wrong: Yes, most of the time  I have been anxious or worried for no good reason: No, not at all  I have felt scared or panicky for no very good reason: No, not at all  Things have been getting on top of me: No, most of the time I have coped quite well  I have been so unhappy that I have had difficulty sleeping: No, not at all  I have felt sad or miserable: No, not at all  I have been so unhappy that I have been crying:  Only occasionally  The thought of harming myself has occurred to me: Never  Total Score: 5    * Discharge Condition: good    [facility] Course: Normal hospital course following the delivery. * Disposition: Home    Discharge Medications:   Current Discharge Medication List      CONTINUE these medications which have NOT CHANGED    Details   [id number] omar-folic-dha (PRENATAL DHA+COMPLETE PRENATAL) -300 mg-mcg-mg cmpk Take  by mouth.      pyridoxine, vitamin B6, (VITAMIN B-6) 25 mg tablet Take 25 mg by mouth daily. * Follow-up Care/Patient Instructions:   Activity: No lifting, Driving, or Strenuous exercise for 2-4 weeks and No sex, tub baths or swimming for 6 weeks  Diet: Regular Diet  Wound Care: As directed    Follow-up Information     Follow up With Details Comments Contact Info    None   None ([phone]) Patient stated that they have no PCP             Signed By:  Mirtha Sharma MD     July 6, 2018

## 2018-07-23 LAB
CA-I BLD-MCNC: 1.69 MMOL/L (ref 1.12–1.32)
CA-I BLD-MCNC: 1.7 MMOL/L (ref 1.12–1.32)
GLUCOSE BLD STRIP.AUTO-MCNC: 106 MG/DL (ref 74–106)
GLUCOSE BLD STRIP.AUTO-MCNC: 90 MG/DL (ref 74–106)
HCO3 BLD-SCNC: 21 MMOL/L (ref 22–26)
HCO3 BLD-SCNC: 21.3 MMOL/L (ref 22–26)
PCO2 BLD: 37.6 MMHG (ref 35–45)
PCO2 BLD: 47.3 MMHG (ref 35–45)
PH BLD: 7.26 [PH] (ref 7.35–7.45)
PH BLD: 7.36 [PH] (ref 7.35–7.45)
PO2 BLD: 23 MMHG (ref 80–100)
PO2 BLD: 29 MMHG (ref 80–100)
POTASSIUM BLD-SCNC: 4.1 MMOL/L (ref 3.5–5.5)
POTASSIUM BLD-SCNC: 4.4 MMOL/L (ref 3.5–5.5)
SAO2 % BLD: 22 % (ref 92–97)
SAO2 % BLD: 45 % (ref 92–97)
SODIUM BLD-SCNC: 136 MMOL/L (ref 136–145)
SODIUM BLD-SCNC: 137 MMOL/L (ref 136–145)

## 2020-02-05 PROBLEM — Z37.9 NORMAL LABOR: Status: RESOLVED | Noted: 2018-07-03 | Resolved: 2020-02-05

## 2020-02-05 PROBLEM — Z34.90 SUPERVISION OF NORMAL PREGNANCY: Status: ACTIVE | Noted: 2020-02-05

## 2020-02-05 PROBLEM — O26.843 UTERINE SIZE DATE DISCREPANCY PREGNANCY, THIRD TRIMESTER: Status: RESOLVED | Noted: 2018-04-17 | Resolved: 2020-02-05

## 2020-02-05 PROBLEM — Z34.01 ENCOUNTER FOR SUPERVISION OF NORMAL FIRST PREGNANCY IN FIRST TRIMESTER: Status: RESOLVED | Noted: 2017-12-26 | Resolved: 2020-02-05

## 2020-06-15 PROBLEM — O99.019 ANEMIA AFFECTING PREGNANCY: Status: ACTIVE | Noted: 2020-06-15

## 2020-08-10 ENCOUNTER — ANESTHESIA EVENT (OUTPATIENT)
Dept: LABOR AND DELIVERY | Age: 30
End: 2020-08-10
Payer: COMMERCIAL

## 2020-08-10 ENCOUNTER — ANESTHESIA (OUTPATIENT)
Dept: LABOR AND DELIVERY | Age: 30
End: 2020-08-10
Payer: COMMERCIAL

## 2020-08-10 ENCOUNTER — HOSPITAL ENCOUNTER (INPATIENT)
Age: 30
LOS: 2 days | Discharge: HOME | End: 2020-08-12
Attending: OBSTETRICS & GYNECOLOGY | Admitting: OBSTETRICS & GYNECOLOGY
Payer: COMMERCIAL

## 2020-08-10 DIAGNOSIS — O99.013 ANEMIA AFFECTING PREGNANCY IN THIRD TRIMESTER: Primary | ICD-10-CM

## 2020-08-10 PROBLEM — Z34.90 ENCOUNTER FOR INDUCTION OF LABOR: Status: ACTIVE | Noted: 2020-08-10

## 2020-08-10 PROBLEM — B95.1 GROUP BETA STREP POSITIVE: Status: ACTIVE | Noted: 2020-08-10

## 2020-08-10 PROBLEM — Z37.9 NORMAL LABOR: Status: ACTIVE | Noted: 2020-08-10

## 2020-08-10 PROBLEM — Z3A.39 39 WEEKS GESTATION OF PREGNANCY: Status: ACTIVE | Noted: 2020-08-10

## 2020-08-10 LAB
ABO + RH BLD: NORMAL
ALBUMIN SERPL-MCNC: 2.6 G/DL (ref 3.5–5)
ALBUMIN/GLOB SERPL: 0.8 {RATIO} (ref 1.2–3.5)
ALP SERPL-CCNC: 152 U/L (ref 50–136)
ALT SERPL-CCNC: 12 U/L (ref 12–65)
ANION GAP SERPL CALC-SCNC: 10 MMOL/L (ref 7–16)
ARTERIAL PATENCY WRIST A: ABNORMAL
ARTERIAL PATENCY WRIST A: ABNORMAL
AST SERPL-CCNC: 10 U/L (ref 15–37)
BASE DEFICIT BLD-SCNC: 3 MMOL/L
BASE DEFICIT BLD-SCNC: 6 MMOL/L
BASOPHILS # BLD: 0 K/UL (ref 0–0.2)
BASOPHILS NFR BLD: 0 % (ref 0–2)
BDY SITE: ABNORMAL
BDY SITE: ABNORMAL
BILIRUB SERPL-MCNC: 0.3 MG/DL (ref 0.2–1.1)
BLOOD GROUP ANTIBODIES SERPL: NORMAL
BUN SERPL-MCNC: 8 MG/DL (ref 6–23)
CA-I BLD-MCNC: 1.69 MMOL/L (ref 1.12–1.32)
CA-I BLD-MCNC: 1.7 MMOL/L (ref 1.12–1.32)
CALCIUM SERPL-MCNC: 8.4 MG/DL (ref 8.3–10.4)
CHLORIDE SERPL-SCNC: 106 MMOL/L (ref 98–107)
CO2 SERPL-SCNC: 21 MMOL/L (ref 21–32)
COLLECT TIME,HTIME: 1656
COLLECT TIME,HTIME: 1656
CREAT SERPL-MCNC: 0.62 MG/DL (ref 0.6–1)
DIFFERENTIAL METHOD BLD: ABNORMAL
EOSINOPHIL # BLD: 0 K/UL (ref 0–0.8)
EOSINOPHIL NFR BLD: 0 % (ref 0.5–7.8)
ERYTHROCYTE [DISTWIDTH] IN BLOOD BY AUTOMATED COUNT: 20.7 % (ref 11.9–14.6)
GAS FLOW.O2 O2 DELIVERY SYS: ABNORMAL L/MIN
GAS FLOW.O2 O2 DELIVERY SYS: ABNORMAL L/MIN
GLOBULIN SER CALC-MCNC: 3.4 G/DL (ref 2.3–3.5)
GLUCOSE BLD STRIP.AUTO-MCNC: 100 MG/DL (ref 65–100)
GLUCOSE BLD STRIP.AUTO-MCNC: 89 MG/DL (ref 65–100)
GLUCOSE SERPL-MCNC: 99 MG/DL (ref 65–100)
HCO3 BLD-SCNC: 22 MMOL/L (ref 22–26)
HCO3 BLD-SCNC: 23.2 MMOL/L (ref 22–26)
HCT VFR BLD AUTO: 37.2 % (ref 35.8–46.3)
HGB BLD-MCNC: 12.2 G/DL (ref 11.7–15.4)
IMM GRANULOCYTES # BLD AUTO: 0.1 K/UL (ref 0–0.5)
IMM GRANULOCYTES NFR BLD AUTO: 1 % (ref 0–5)
LYMPHOCYTES # BLD: 1.5 K/UL (ref 0.5–4.6)
LYMPHOCYTES NFR BLD: 12 % (ref 13–44)
MCH RBC QN AUTO: 28.6 PG (ref 26.1–32.9)
MCHC RBC AUTO-ENTMCNC: 32.8 G/DL (ref 31.4–35)
MCV RBC AUTO: 87.3 FL (ref 79.6–97.8)
MONOCYTES # BLD: 0.7 K/UL (ref 0.1–1.3)
MONOCYTES NFR BLD: 6 % (ref 4–12)
NEUTS SEG # BLD: 9.7 K/UL (ref 1.7–8.2)
NEUTS SEG NFR BLD: 80 % (ref 43–78)
NRBC # BLD: 0 K/UL (ref 0–0.2)
PCO2 BLD: 36.5 MMHG (ref 35–45)
PCO2 BLD: 57.2 MMHG (ref 35–45)
PH BLD: 7.22 [PH] (ref 7.35–7.45)
PH BLD: 7.39 [PH] (ref 7.35–7.45)
PLATELET # BLD AUTO: 131 K/UL (ref 150–450)
PMV BLD AUTO: 10.7 FL (ref 9.4–12.3)
PO2 BLD: 17 MMHG (ref 75–100)
PO2 BLD: 34 MMHG (ref 75–100)
POTASSIUM BLD-SCNC: 4.5 MMOL/L (ref 3.5–5.1)
POTASSIUM BLD-SCNC: 4.7 MMOL/L (ref 3.5–5.1)
POTASSIUM SERPL-SCNC: 3.4 MMOL/L (ref 3.5–5.1)
PROT SERPL-MCNC: 6 G/DL (ref 6.3–8.2)
RBC # BLD AUTO: 4.26 M/UL (ref 4.05–5.2)
SAO2 % BLD: 16 % (ref 95–98)
SAO2 % BLD: 66 % (ref 95–98)
SERVICE CMNT-IMP: ABNORMAL
SERVICE CMNT-IMP: ABNORMAL
SODIUM BLD-SCNC: 137 MMOL/L (ref 136–145)
SODIUM BLD-SCNC: 137 MMOL/L (ref 136–145)
SODIUM SERPL-SCNC: 137 MMOL/L (ref 136–145)
SPECIMEN EXP DATE BLD: NORMAL
SPECIMEN TYPE: ABNORMAL
SPECIMEN TYPE: ABNORMAL
WBC # BLD AUTO: 12.1 K/UL (ref 4.3–11.1)

## 2020-08-10 PROCEDURE — 75410000002 HC LABOR FEE PER 1 HR

## 2020-08-10 PROCEDURE — 4A1HXCZ MONITORING OF PRODUCTS OF CONCEPTION, CARDIAC RATE, EXTERNAL APPROACH: ICD-10-PCS | Performed by: OBSTETRICS & GYNECOLOGY

## 2020-08-10 PROCEDURE — 74011250637 HC RX REV CODE- 250/637: Performed by: OBSTETRICS & GYNECOLOGY

## 2020-08-10 PROCEDURE — 75410000003 HC RECOV DEL/VAG/CSECN EA 0.5 HR

## 2020-08-10 PROCEDURE — 99285 EMERGENCY DEPT VISIT HI MDM: CPT

## 2020-08-10 PROCEDURE — 77030019905 HC CATH URETH INTMIT MDII -A

## 2020-08-10 PROCEDURE — 82803 BLOOD GASES ANY COMBINATION: CPT

## 2020-08-10 PROCEDURE — 65270000029 HC RM PRIVATE

## 2020-08-10 PROCEDURE — 74011000250 HC RX REV CODE- 250: Performed by: ANESTHESIOLOGY

## 2020-08-10 PROCEDURE — A4300 CATH IMPL VASC ACCESS PORTAL: HCPCS | Performed by: ANESTHESIOLOGY

## 2020-08-10 PROCEDURE — 36415 COLL VENOUS BLD VENIPUNCTURE: CPT

## 2020-08-10 PROCEDURE — 59025 FETAL NON-STRESS TEST: CPT

## 2020-08-10 PROCEDURE — 76060000078 HC EPIDURAL ANESTHESIA

## 2020-08-10 PROCEDURE — 85025 COMPLETE CBC W/AUTO DIFF WBC: CPT

## 2020-08-10 PROCEDURE — 74011250636 HC RX REV CODE- 250/636: Performed by: OBSTETRICS & GYNECOLOGY

## 2020-08-10 PROCEDURE — 0KQM0ZZ REPAIR PERINEUM MUSCLE, OPEN APPROACH: ICD-10-PCS | Performed by: OBSTETRICS & GYNECOLOGY

## 2020-08-10 PROCEDURE — 77030011943

## 2020-08-10 PROCEDURE — 77030002888 HC SUT CHRMC J&J -A

## 2020-08-10 PROCEDURE — 77030011945 HC CATH URIN INT ST MENT -A

## 2020-08-10 PROCEDURE — 74011000250 HC RX REV CODE- 250: Performed by: OBSTETRICS & GYNECOLOGY

## 2020-08-10 PROCEDURE — 74011250636 HC RX REV CODE- 250/636: Performed by: STUDENT IN AN ORGANIZED HEALTH CARE EDUCATION/TRAINING PROGRAM

## 2020-08-10 PROCEDURE — 77030014125 HC TY EPDRL BBMI -B: Performed by: ANESTHESIOLOGY

## 2020-08-10 PROCEDURE — 75410000000 HC DELIVERY VAGINAL/SINGLE

## 2020-08-10 PROCEDURE — 80053 COMPREHEN METABOLIC PANEL: CPT

## 2020-08-10 PROCEDURE — 77030018846 HC SOL IRR STRL H20 ICUM -A

## 2020-08-10 PROCEDURE — 86900 BLOOD TYPING SEROLOGIC ABO: CPT

## 2020-08-10 PROCEDURE — 00HU33Z INSERTION OF INFUSION DEVICE INTO SPINAL CANAL, PERCUTANEOUS APPROACH: ICD-10-PCS | Performed by: ANESTHESIOLOGY

## 2020-08-10 PROCEDURE — 82947 ASSAY GLUCOSE BLOOD QUANT: CPT

## 2020-08-10 RX ORDER — LIDOCAINE HYDROCHLORIDE 10 MG/ML
1 INJECTION INFILTRATION; PERINEURAL
Status: DISCONTINUED | OUTPATIENT
Start: 2020-08-10 | End: 2020-08-10 | Stop reason: HOSPADM

## 2020-08-10 RX ORDER — ROPIVACAINE HYDROCHLORIDE 2 MG/ML
INJECTION, SOLUTION EPIDURAL; INFILTRATION; PERINEURAL
Status: DISCONTINUED | OUTPATIENT
Start: 2020-08-10 | End: 2020-08-10 | Stop reason: HOSPADM

## 2020-08-10 RX ORDER — ONDANSETRON 8 MG/1
8 TABLET, ORALLY DISINTEGRATING ORAL
Status: COMPLETED | OUTPATIENT
Start: 2020-08-10 | End: 2020-08-12

## 2020-08-10 RX ORDER — ZOLPIDEM TARTRATE 5 MG/1
5 TABLET ORAL
Status: DISCONTINUED | OUTPATIENT
Start: 2020-08-10 | End: 2020-08-12 | Stop reason: HOSPADM

## 2020-08-10 RX ORDER — DOCUSATE SODIUM 100 MG/1
100 CAPSULE, LIQUID FILLED ORAL 2 TIMES DAILY
Status: DISCONTINUED | OUTPATIENT
Start: 2020-08-10 | End: 2020-08-12 | Stop reason: HOSPADM

## 2020-08-10 RX ORDER — DIPHENHYDRAMINE HCL 25 MG
25 CAPSULE ORAL
Status: DISCONTINUED | OUTPATIENT
Start: 2020-08-10 | End: 2020-08-12 | Stop reason: HOSPADM

## 2020-08-10 RX ORDER — HYDROMORPHONE HYDROCHLORIDE 1 MG/ML
1 INJECTION, SOLUTION INTRAMUSCULAR; INTRAVENOUS; SUBCUTANEOUS
Status: DISCONTINUED | OUTPATIENT
Start: 2020-08-10 | End: 2020-08-12 | Stop reason: HOSPADM

## 2020-08-10 RX ORDER — SODIUM CHLORIDE 0.9 % (FLUSH) 0.9 %
5-40 SYRINGE (ML) INJECTION EVERY 8 HOURS
Status: DISCONTINUED | OUTPATIENT
Start: 2020-08-10 | End: 2020-08-10

## 2020-08-10 RX ORDER — MINERAL OIL
120 OIL (ML) ORAL
Status: COMPLETED | OUTPATIENT
Start: 2020-08-10 | End: 2020-08-10

## 2020-08-10 RX ORDER — DEXTROSE, SODIUM CHLORIDE, SODIUM LACTATE, POTASSIUM CHLORIDE, AND CALCIUM CHLORIDE 5; .6; .31; .03; .02 G/100ML; G/100ML; G/100ML; G/100ML; G/100ML
125 INJECTION, SOLUTION INTRAVENOUS CONTINUOUS
Status: DISCONTINUED | OUTPATIENT
Start: 2020-08-10 | End: 2020-08-10

## 2020-08-10 RX ORDER — IBUPROFEN 800 MG/1
800 TABLET ORAL EVERY 8 HOURS
Status: DISCONTINUED | OUTPATIENT
Start: 2020-08-10 | End: 2020-08-11

## 2020-08-10 RX ORDER — LIDOCAINE HYDROCHLORIDE AND EPINEPHRINE 15; 5 MG/ML; UG/ML
INJECTION, SOLUTION EPIDURAL
Status: COMPLETED | OUTPATIENT
Start: 2020-08-10 | End: 2020-08-10

## 2020-08-10 RX ORDER — OXYTOCIN/RINGER'S LACTATE 30/500 ML
250 PLASTIC BAG, INJECTION (ML) INTRAVENOUS ONCE
Status: COMPLETED | OUTPATIENT
Start: 2020-08-10 | End: 2020-08-10

## 2020-08-10 RX ORDER — LIDOCAINE HYDROCHLORIDE 20 MG/ML
JELLY TOPICAL
Status: DISCONTINUED | OUTPATIENT
Start: 2020-08-10 | End: 2020-08-10 | Stop reason: HOSPADM

## 2020-08-10 RX ORDER — BUTORPHANOL TARTRATE 2 MG/ML
1 INJECTION INTRAMUSCULAR; INTRAVENOUS
Status: DISCONTINUED | OUTPATIENT
Start: 2020-08-10 | End: 2020-08-10 | Stop reason: HOSPADM

## 2020-08-10 RX ORDER — ROPIVACAINE HYDROCHLORIDE 2 MG/ML
INJECTION, SOLUTION EPIDURAL; INFILTRATION; PERINEURAL AS NEEDED
Status: DISCONTINUED | OUTPATIENT
Start: 2020-08-10 | End: 2020-08-10 | Stop reason: HOSPADM

## 2020-08-10 RX ORDER — OXYCODONE AND ACETAMINOPHEN 7.5; 325 MG/1; MG/1
2 TABLET ORAL
Status: DISCONTINUED | OUTPATIENT
Start: 2020-08-10 | End: 2020-08-11

## 2020-08-10 RX ORDER — CEFAZOLIN SODIUM/WATER 2 G/20 ML
2 SYRINGE (ML) INTRAVENOUS ONCE
Status: COMPLETED | OUTPATIENT
Start: 2020-08-10 | End: 2020-08-10

## 2020-08-10 RX ORDER — NALOXONE HYDROCHLORIDE 0.4 MG/ML
0.4 INJECTION, SOLUTION INTRAMUSCULAR; INTRAVENOUS; SUBCUTANEOUS AS NEEDED
Status: DISCONTINUED | OUTPATIENT
Start: 2020-08-10 | End: 2020-08-12 | Stop reason: HOSPADM

## 2020-08-10 RX ORDER — SODIUM CHLORIDE 0.9 % (FLUSH) 0.9 %
5-40 SYRINGE (ML) INJECTION AS NEEDED
Status: DISCONTINUED | OUTPATIENT
Start: 2020-08-10 | End: 2020-08-10

## 2020-08-10 RX ORDER — OXYTOCIN/RINGER'S LACTATE 30/500 ML
0-25 PLASTIC BAG, INJECTION (ML) INTRAVENOUS
Status: DISCONTINUED | OUTPATIENT
Start: 2020-08-10 | End: 2020-08-10

## 2020-08-10 RX ADMIN — Medication 5 ML: at 13:24

## 2020-08-10 RX ADMIN — Medication 5 ML: at 13:22

## 2020-08-10 RX ADMIN — SODIUM CHLORIDE, SODIUM LACTATE, POTASSIUM CHLORIDE, AND CALCIUM CHLORIDE 500 ML: 600; 310; 30; 20 INJECTION, SOLUTION INTRAVENOUS at 12:44

## 2020-08-10 RX ADMIN — Medication 15000 MILLI-UNITS/HR: at 17:04

## 2020-08-10 RX ADMIN — LIDOCAINE HYDROCHLORIDE,EPINEPHRINE BITARTRATE 3.5 ML: 15; .005 INJECTION, SOLUTION EPIDURAL; INFILTRATION; INTRACAUDAL; PERINEURAL at 13:17

## 2020-08-10 RX ADMIN — IBUPROFEN 800 MG: 800 TABLET, FILM COATED ORAL at 19:24

## 2020-08-10 RX ADMIN — WATER 2 G: 100 INJECTION, SOLUTION INTRAVENOUS at 12:51

## 2020-08-10 RX ADMIN — SODIUM CHLORIDE, SODIUM LACTATE, POTASSIUM CHLORIDE, CALCIUM CHLORIDE, AND DEXTROSE MONOHYDRATE 125 ML/HR: 600; 310; 30; 20; 5 INJECTION, SOLUTION INTRAVENOUS at 12:41

## 2020-08-10 RX ADMIN — MINERAL OIL 120 ML: 471.95 OIL ORAL at 15:48

## 2020-08-10 RX ADMIN — ROPIVACAINE HYDROCHLORIDE 10 ML/HR: 2 INJECTION, SOLUTION EPIDURAL; INFILTRATION at 13:24

## 2020-08-10 NOTE — PROGRESS NOTES
Lab has drawn blood. [place] called and reported last CBC from 7/13/2020.   States he will be up be up to place epidural.

## 2020-08-10 NOTE — PROGRESS NOTES
Hallie Trinh at bedside. AINSLEY Morse at bedside. Assisted pt to sitting up on bedside. Timeout completed with MD, AINSLEY and myself at bedside. See CRNA notes for times    Test dose given at 1318. Negative reaction. Dose given at [id number]. Pt assisted to lying back in left  tilt position. See anesthesia record for details. See vital sign flow sheet for BP. Tolerated procedure well.

## 2020-08-10 NOTE — PROGRESS NOTES
08/10/20 1217   Cervical Exam   Dilation (cm) 5   Eff 100 %   Station -2   Vaginal exam done by? LEIS     Orders to admit for labor. May have epidural as desired.

## 2020-08-10 NOTE — PROGRESS NOTES
Vaginal delivery per Dr Emily Suh with nuchal times one. Apgars 7/9, weight 7lb 15 oz. Second degree vaginal laceration.

## 2020-08-10 NOTE — L&D DELIVERY NOTE
Delivery Summary    Patient: Vikash Cantu MRN: [id number]  SSN: [id number]    YOB: 1990  Age: 35 y.o. Sex: female       Information for the patient's :  Meaghanmikoneil Adelfo [[id number]]       Labor Events:    Labor: No    Steroids: None   Cervical Ripening Date/Time:       Cervical Ripening Type: None   Antibiotics During Labor: Yes   Rupture Identifier:      Rupture Date/Time: 8/10/2020 3:45 PM   Rupture Type: AROM   Amniotic Fluid Volume:  Moderate    Amniotic Fluid Description:      Amniotic Fluid Odor:      Induction: None       Induction Date/Time:        Indications for Induction:      Augmentation: None   Augmentation Date/Time:      Indications for Augmentation:     Labor complications: None       Additional complications:        Delivery Events:  Indications For Episiotomy:     Episiotomy: None   Perineal Laceration(s): 2nd   Repaired:     Periurethral Laceration Location:      Repaired:     Labial Laceration Location:     Repaired:     Sulcal Laceration Location:     Repaired:     Vaginal Laceration Location:     Repaired:     Cervical Laceration Location:     Repaired:     Repair Suture: Chromic 2-0;Chromic 3-0   Number of Repair Packets: 2   Estimated Blood Loss (ml):  ml   Quantitative Blood Loss (ml)                Delivery Date: 8/10/2020    Delivery Time: 4:56 PM  Delivery Type: Vaginal, Spontaneous  Sex:  Female    Gestational Age: 38w11d   Delivery Clinician:  Blade Shine  Living Status: Living   Delivery Location: L&D            APGARS  One minute Five minutes Ten minutes   Skin color: 0   1        Heart rate: 2   2        Grimace: 2   2        Muscle tone: 1   2        Breathin   2        Totals: 7   9            Presentation: Vertex    Position: Right Occiput Anterior  Resuscitation Method:  Suctioning-bulb     Meconium Stained: None      Cord Information: 3 Vessels  Complications: Nuchal Cord With Compressions  Cord around:    Delayed cord clamping? Yes  Cord clamped date/time:   Disposition of Cord Blood: Lab    Blood Gases Sent?: No    Placenta:  Date/Time: 8/10/2020  5:02 PM  Removal: Expressed      Appearance: Normal;Intact     [place] Measurements:  Birth Weight: 7 lb 15 oz (3.6 kg)      Birth Length: 50 cm      Head Circumference: 34.5 cm      Chest Circumference: 33 cm     Abdominal Girth: Other Providers:   Dano DEY;JULIO C BRANCH;;;KIRERA GARNETT;;;;;BILL MAR, Obstetrician;Primary Nurse;Primary [place] Nurse;Nicu Nurse;Neonatologist;Anesthesiologist;Crna;Nurse Practitioner;Midwife;Scrub Tech           Group B Strep:   Lab Results   Component Value Date/Time    GrBStrep, External Negative 2018     Information for the patient's :  Helene Lundberg [[id number]]   No results found for: ABORH, PCTABR, PCTDIG, BILI, ABORHEXT, ABORH     Recent Labs     08/10/20  1715   HCO3I 23.2   SO2I 16*   IBD 6   SPECTI ARTERIAL CORD   ISITE CORD   IDEV ROOM AIR   IALLEN NOT APPLICABLE      Pt had poor pushing effort. I pushed with her for a little while then left the room. Siddhartha Juan called out about delivery about 20min later. The head had suddenly delivered. I reduced a loose nuchal cord and delivered the anterior shoulder. Still poor pushing effort. Due to poor color at delivery, nicu was called. Dr Balwinder Orellana responded. Baby was suctioned of some fluid. Apgars good. 2nd degree tear repaired in usual fashion. Sphincter intact. Rectum intact per rectal exam after repair. Mother and baby doing well.

## 2020-08-10 NOTE — PROGRESS NOTES
Admitted to 426 in labor. IV started x 2 attempts and LR IVFB for epidural placement. Lab called to draw blood.

## 2020-08-10 NOTE — H&P
History & Physical    Name: Sindy Rasheed MRN: [id number]  SSN: [id number]    YOB: 1990  Age: 35 y.o. Sex: female        Subjective: Painful, regular contractions  34 yo  at 39+5 wks presents c/o painful contractions which started overnight. She thinks that they are about every 5 minutes. She denies vb or lof. She reports normal FM. Pregnancy c/b GBS positivity. Estimated Date of Delivery: 20  OB History    Para Term  AB Living   2 1 1     1   SAB TAB Ectopic Molar Multiple Live Births           0 1      # Outcome Date GA Lbr Yadiel/2nd Weight Sex Delivery Anes PTL Lv   2 Current            1 Term 18 39w2d / 03:38 3.5 kg M Vag-Vacuum EPIDURAL AN N KAREN      Birth Comments: 3rd degree lac       Past Medical History:   Diagnosis Date    Anemia affecting pregnancy 6/15/2020    Chronic migraine     PCOS (polycystic ovarian syndrome)      Past Surgical History:   Procedure Laterality Date    HX WISDOM TEETH EXTRACTION  2017     Social History     Occupational History    Not on file   Tobacco Use    Smoking status: Never Smoker    Smokeless tobacco: Never Used   Substance and Sexual Activity    Alcohol use: No    Drug use: Never    Sexual activity: Yes     Partners: Male     Birth control/protection: None     Family History   Problem Relation Age of Onset    Migraines Father     Alzheimer Maternal Grandfather     Heart Disease Maternal Grandfather     Lung Disease Maternal Grandfather     Colon Cancer Neg Hx     Ovarian Cancer Neg Hx     Breast Cancer Neg Hx     Diabetes Neg Hx     Hypertension Neg Hx        Allergies   Allergen Reactions    Amoxicillin Hives     Prior to Admission medications    Medication Sig Start Date End Date Taking? Authorizing Provider   ferrous sulfate 325 mg (65 mg iron) tablet Take 1 Tab by mouth Daily (before breakfast).  6/15/20   Alec Carroll MD   ascorbic acid, vitamin C, (VITAMIN C) 500 mg tablet Take 1 Tab by mouth daily. 6/15/20   Marguerite Milian MD   [id number] omar-folic-dha (PRENATAL DHA+COMPLETE PRENATAL) -259 mg-mcg-mg cmpk Take  by mouth. Provider, Historical        Review of Systems: Pertinent items are noted in HPI. 10 point ROS otherwise negative. Objective:     Vitals:  Vitals:    08/10/20 0956 08/10/20 0957   BP:  110/70   Pulse:  84   Resp:  18   Temp:  98.2 °F (36.8 °C)   Height: 5' 8\" (1.727 m)         Physical Exam:  Patient without distress. Abdomen: soft, nontender  Fundus: soft and non tender  Perineum: blood absent, amniotic fluid absent  Cervical Exam: 3 cm dilated    90% effaced    -2 station with low fetal head  Lower Extremities:  - Edema No  Membranes:  Intact  Fetal Heart Rate: Baseline: 120s per minute  Variability: moderate  Accelerations: yes  Decelerations: none  Uterine contractions: none, regular, every 6-7 minutes    Prenatal Labs:   Lab Results   Component Value Date/Time    ABO/Rh(D) A POSITIVE 2018 11:30 PM    Rubella, External 1.53 11/15/2017    GrBStrep, External Negative 2018    HBsAg, External Negative 11/15/2017    RPR, External Non Reactive 11/15/2017    Gonorrhea, External Negative 2020    Chlamydia, External Negative 2020    ABO,Rh A Positive 11/15/2017         Assessment/Plan: 34 yo  at 39+5 in early labor. Active Problems:    * No active hospital problems. *       Plan:Ambulate and repeat cervical exam    Addendum:   Repeat cervical exam 100/5/-2. Admit for labor.

## 2020-08-10 NOTE — PROGRESS NOTES
Pt to room ELLIE 1 for triage with chief complaint of contraction. Onset was 2200 last night. Assessment begins, EFM and [place] applied to a soft non tender abdomen and tracing well. Dr Siddhartha Gallo called to assess patient.

## 2020-08-10 NOTE — PROGRESS NOTES
Patient arrived to Phase 2. Vitals signs monitoring initiated. Report  received from [address]    Patient oriented to Person , Place, Time, Situation. Patient denies complaints of nausea and dizziness. Patient tolerated PO fluids. Patient ambulated from stretcher to chair with minimal assistance. IV removed. Patient dressed with home clothes. Olya Talamantes RN Reviewed discharge instructions with patients. 1106- Point of contact  given verbal instructions via phone due to COVID procedures. She's leaving home now on her way. Will randall when she arrives. 1130- Patient transported to vehicle via wheel chair. Shift assessment complete. See flowsheet for details. POC reviewed with pt and pt verbalized understanding. Pt oriented to room and has call light within reach. Pt denies any needs at this time but will call out PRN. Pt now resting in bed with  at bedside.

## 2020-08-10 NOTE — ANESTHESIA PROCEDURE NOTES
Epidural Block    Start time: 8/10/2020 1:11 PM  End time: 8/10/2020 1:17 PM  Performed by: Annette Cuellar MD  Authorized by:  Annette Cuellar MD     Pre-Procedure  Indication: labor epidural    Preanesthetic Checklist: patient identified, risks and benefits discussed, anesthesia consent, site marked, patient being monitored, timeout performed and anesthesia consent    Timeout Time: 13:11        Epidural:   Patient position:  Seated  Prep region:  Lumbar  Prep: DuraPrep    Location:  L3-4    Needle and Epidural Catheter:   Needle Type:  Tuohy  Needle Gauge:  18 G  Injection Technique:  Loss of resistance using air  Attempts:  1  Catheter Size:  20 G  Depth in Epidural Space (cm):  4  Events: no blood with aspiration, no cerebrospinal fluid with aspiration, no paresthesia and negative aspiration test    Test Dose:  Lidocaine 1.5% w/ epi and negative    Assessment:   Catheter Secured:  Tegaderm and tape  Insertion:  Uncomplicated  Patient tolerance:  Patient tolerated the procedure well with no immediate complications

## 2020-08-10 NOTE — ANESTHESIA PREPROCEDURE EVALUATION
Relevant Problems   No relevant active problems       Anesthetic History               Review of Systems / Medical History  Patient summary reviewed and pertinent labs reviewed    Pulmonary                   Neuro/Psych              Cardiovascular                  Exercise tolerance: >4 METS     GI/Hepatic/Renal                Endo/Other             Other Findings              Physical Exam    Airway  Mallampati: II  TM Distance: > 6 cm  Neck ROM: normal range of motion   Mouth opening: Normal     Cardiovascular  Regular rate and rhythm,  S1 and S2 normal,  no murmur, click, rub, or gallop             Dental  No notable dental hx       Pulmonary  Breath sounds clear to auscultation               Abdominal         Other Findings            Anesthetic Plan    ASA: 2  Anesthesia type: epidural            Anesthetic plan and risks discussed with: Patient

## 2020-08-11 PROCEDURE — 65270000029 HC RM PRIVATE

## 2020-08-11 PROCEDURE — 74011250637 HC RX REV CODE- 250/637: Performed by: OBSTETRICS & GYNECOLOGY

## 2020-08-11 RX ORDER — OXYCODONE HYDROCHLORIDE 5 MG/1
5 TABLET ORAL
Status: DISCONTINUED | OUTPATIENT
Start: 2020-08-11 | End: 2020-08-12 | Stop reason: HOSPADM

## 2020-08-11 RX ORDER — IBUPROFEN 800 MG/1
800 TABLET ORAL
Status: DISCONTINUED | OUTPATIENT
Start: 2020-08-11 | End: 2020-08-12 | Stop reason: HOSPADM

## 2020-08-11 RX ORDER — ACETAMINOPHEN 500 MG
1000 TABLET ORAL
Status: DISCONTINUED | OUTPATIENT
Start: 2020-08-11 | End: 2020-08-12 | Stop reason: HOSPADM

## 2020-08-11 RX ORDER — MISOPROSTOL 100 UG/1
50 TABLET ORAL EVERY 4 HOURS
Status: DISCONTINUED | OUTPATIENT
Start: 2020-08-11 | End: 2020-08-11

## 2020-08-11 RX ADMIN — IBUPROFEN 800 MG: 800 TABLET, FILM COATED ORAL at 15:15

## 2020-08-11 RX ADMIN — DOCUSATE SODIUM 100 MG: 100 CAPSULE, LIQUID FILLED ORAL at 09:19

## 2020-08-11 RX ADMIN — DOCUSATE SODIUM 100 MG: 100 CAPSULE, LIQUID FILLED ORAL at 15:15

## 2020-08-11 RX ADMIN — Medication 1 SPRAY: at 09:20

## 2020-08-11 RX ADMIN — IBUPROFEN 800 MG: 800 TABLET, FILM COATED ORAL at 21:29

## 2020-08-11 RX ADMIN — ACETAMINOPHEN 1000 MG: 500 TABLET, FILM COATED ORAL at 18:38

## 2020-08-11 RX ADMIN — OXYCODONE HYDROCHLORIDE AND ACETAMINOPHEN 1 TABLET: 7.5; 325 TABLET ORAL at 01:47

## 2020-08-11 RX ADMIN — WITCH HAZEL 1 PAD: 500 SOLUTION RECTAL; TOPICAL at 09:20

## 2020-08-11 RX ADMIN — IBUPROFEN 800 MG: 800 TABLET, FILM COATED ORAL at 07:09

## 2020-08-11 NOTE — LACTATION NOTE

## 2020-08-11 NOTE — PROGRESS NOTES
Chart reviewed - no needs identified. SW met with patient and  while practicing social distancing. Patient denies any history of postpartum depression/anxiety. Patient given informational packet on  mood & anxiety disorders (resources/education) and PCP list.    Family denies any additional needs from  at this time. Family has 's contact information should any needs/questions arise.     RICARDO Goodrich-TORIE  [facility]   [phone]

## 2020-08-11 NOTE — LACTATION NOTE
In to see mom and infant for the first time. Experienced mom stated that infant has been latching and nursing well but her nipples are sore. Observed mom latch infant and instructed her to initially latch infant in the cross cradle hold versus the cradle hold. Then instructed her on how to get infant into a deeper latch and maintain it. Mom stated that it felt much better. Reviewed the expectations of the first 24 hours as well as the second night of life. Lactation consultant will follow up tomorrow.

## 2020-08-11 NOTE — PROGRESS NOTES
Post-Partum Day Number 1 Progress Note    Patient doing well post-partum without significant complaint. Voiding without difficulty, normal lochia. Vitals:    Patient Vitals for the past 8 hrs:   BP Temp Pulse Resp SpO2   20 0724 94/65 97.3 °F (36.3 °C) 85 16 98 %     Temp (24hrs), Av.1 °F (36.7 °C), Min:97.3 °F (36.3 °C), Max:98.8 °F (37.1 °C)      Vital signs stable, afebrile. Exam:  Patient without distress. Abdomen soft, fundus firm at level of umbilicus, nontender               Lower extremities are negative for swelling, cords or tenderness. Lab/Data Review: All lab results for the last 24 hours reviewed. Assessment and Plan:  Patient appears to be having uncomplicated post-partum course. Continue routine perineal care and maternal education. Plan discharge tomorrow if no problems occur. Follow up with me in 6 wks in the office, earlier if any issues.

## 2020-08-11 NOTE — PROGRESS NOTES
SBAR IN Report: Mother    Verbal report received from [facility], RN on this patient, who is now being transferred from L&D for routine progression of care. The patient is not wearing a green \"Anesthesia-Duramorph\" band. Report consisted of patient's Situation, Background, Assessment and Recommendations (SBAR). [place] ID bands were compared with the identification form, and verified with the patient and transferring nurse. Information from the SBAR and the [place] Report was reviewed with the transferring nurse; opportunity for questions and clarification provided.

## 2020-08-11 NOTE — ANESTHESIA POSTPROCEDURE EVALUATION
* No procedures listed *. No value filed. Anesthesia Post Evaluation      Multimodal analgesia: multimodal analgesia used between 6 hours prior to anesthesia start to PACU discharge  Patient location during evaluation: PACU  Patient participation: complete - patient participated  Level of consciousness: awake and alert  Pain management: adequate  Airway patency: patent  Anesthetic complications: no  Cardiovascular status: acceptable  Respiratory status: acceptable  Hydration status: acceptable  Post anesthesia nausea and vomiting:  none      INITIAL Post-op Vital signs: No vitals data found for the desired time range.

## 2020-08-11 NOTE — PROGRESS NOTES
Admission assessment complete as noted. Patient oriented to room and unit. Plan of care reviewed and patient verbalizes understanding. Questions encouraged and answered. Patent encouraged to call for needs or concerns. Safety Teaching reviewed:   1. Hand hygiene prior to handling the infant. 2. Use of bulb syringe. 3. Bracelets with matching numbers are placed on mother and infant  4. An infant security tag  [facility]) is placed on the infant's ankle and monitored  5. All OB nurses wear pink Employee badges - do not give your baby to anyone without proper identification. 6. Never leave the baby alone in the room. 7. The infant should be placed on their back to sleep. on a firm mattress. No toys should be placed in the crib. (safe sleep video offered to view)  8. Never shake the baby (video offered to view)  9. Infant fall prevention - do not sleep with the baby, and place the baby in the crib while ambulating. 8. Mother and Baby Care booklet given to Mother.

## 2020-08-11 NOTE — PROGRESS NOTES
Spoke with Dr. Klever Conway about pain control, discontinue percocet, oxycodone 5 mg po every 4 hours prn moderate pain, motrin 800 mg po every 6 hours prn, tylenol 1000 mg po every 6 hours prn, motrin and tylenol to alternate for mild pain, read back

## 2020-08-11 NOTE — PROGRESS NOTES
Patient up to bathroom with 1 person assistance. Patient able to void 700ml. Juliana-care taught and completed. Questions encouraged and answered. Patient back to bed, encouraged to call for needs or concerns. Verbalizes understanding.

## 2020-08-12 VITALS
OXYGEN SATURATION: 97 % | DIASTOLIC BLOOD PRESSURE: 63 MMHG | RESPIRATION RATE: 16 BRPM | TEMPERATURE: 97.8 F | BODY MASS INDEX: 22.5 KG/M2 | SYSTOLIC BLOOD PRESSURE: 95 MMHG | HEIGHT: 68 IN | HEART RATE: 72 BPM

## 2020-08-12 PROCEDURE — 74011250637 HC RX REV CODE- 250/637: Performed by: OBSTETRICS & GYNECOLOGY

## 2020-08-12 RX ORDER — OXYCODONE HYDROCHLORIDE 5 MG/1
5 TABLET ORAL
Qty: 10 TAB | Refills: 0 | Status: SHIPPED | OUTPATIENT
Start: 2020-08-12 | End: 2020-08-15

## 2020-08-12 RX ORDER — IBUPROFEN 800 MG/1
800 TABLET ORAL
Qty: 90 TAB | Refills: 1 | Status: SHIPPED | OUTPATIENT
Start: 2020-08-12 | End: 2020-10-12

## 2020-08-12 RX ADMIN — OXYCODONE 5 MG: 5 TABLET ORAL at 05:27

## 2020-08-12 RX ADMIN — ACETAMINOPHEN 1000 MG: 500 TABLET, FILM COATED ORAL at 05:27

## 2020-08-12 RX ADMIN — DOCUSATE SODIUM 100 MG: 100 CAPSULE, LIQUID FILLED ORAL at 11:02

## 2020-08-12 RX ADMIN — IBUPROFEN 800 MG: 800 TABLET, FILM COATED ORAL at 05:27

## 2020-08-12 RX ADMIN — ONDANSETRON 8 MG: 8 TABLET, ORALLY DISINTEGRATING ORAL at 05:23

## 2020-08-12 RX ADMIN — IBUPROFEN 800 MG: 800 TABLET, FILM COATED ORAL at 11:02

## 2020-08-12 NOTE — DISCHARGE INSTRUCTIONS
Patient Education        After Your Delivery (the Postpartum Period): Care Instructions  Your Care Instructions    Congratulations on the birth of your baby. Like pregnancy, the  period can be a time of excitement, mohamud, and exhaustion. You may look at your wondrous little baby and feel happy. You may also be overwhelmed by your new sleep hours and new responsibilities. At first, babies often sleep during the days and are awake at night. They do not have a pattern or routine. They may make sudden gasps, jerk themselves awake, or look like they have crossed eyes. These are all normal, and they may even make you smile. In these first weeks after delivery, try to take good care of yourself. It may take 4 to 6 weeks to feel like yourself again, and possibly longer if you had a  birth. You will likely feel very tired for several weeks. Your days will be full of ups and downs, but lots of mohamud as well. Follow-up care is a key part of your treatment and safety. Be sure to make and go to all appointments, and call your doctor if you are having problems. It's also a good idea to know your test results and keep a list of the medicines you take. How can you care for yourself at home? Take care of your body after delivery  · Use pads instead of tampons for the bloody flow that may last as long as 2 weeks. · Ease cramps with ibuprofen (Advil, Motrin). · Ease soreness of hemorrhoids and the area between your vagina and rectum with ice compresses or witch hazel pads. · Ease constipation by drinking lots of fluid and eating high-fiber foods. Ask your doctor about over-the-counter stool softeners. · Cleanse yourself with a gentle squeeze of warm water from a bottle instead of wiping with toilet paper. · Take a sitz bath in warm water several times a day. · Wear a good nursing bra. Ease sore and swollen breasts with warm, wet washcloths.   · If you are not breastfeeding, use ice rather than heat for breast soreness. · Your period may not start for several months if you are breastfeeding. You may bleed more, and longer at first, than you did before you got pregnant. · Wait until you are healed (about 4 to 6 weeks) before you have sexual intercourse. Your doctor will tell you when it is okay to have sex. · Try not to travel with your baby for 5 or 6 weeks. If you take a long car trip, make frequent stops to walk around and stretch. Avoid exhaustion  · Rest every day. Try to nap when your baby naps. · Ask another adult to be with you for a few days after delivery. · Plan for  if you have other children. · Stay flexible so you can eat at odd hours and sleep when you need to. Both you and your baby are making new schedules. · Plan small trips to get out of the house. Change can make you feel less tired. · Ask for help with housework, cooking, and shopping. Remind yourself that your job is to care for your baby. Know about help for postpartum depression  · \"Baby blues\" are common for the first 1 to 2 weeks after birth. You may cry or feel sad or irritable for no reason. · Rest whenever you can. Being tired makes it harder to handle your emotions. · Go for walks with your baby. · Talk to your partner, friends, and family about your feelings. · If your symptoms last for more than a few weeks, or if you feel very depressed, ask your doctor for help. · Postpartum depression can be treated. Support groups and counseling can help. Sometimes medicine can also help. Stay healthy  · Eat healthy foods so you have more energy and lose extra baby pounds. · If you breastfeed, avoid drugs. If you quit smoking during pregnancy, try to stay smoke-free. If you choose to have a drink now and then, have only one drink, and limit the number of occasions that you have a drink. Wait to breastfeed at least 2 hours after you have a drink to reduce the amount of alcohol the baby may get in the milk.   · Start daily exercise after 4 to 6 weeks, but rest when you feel tired. · Learn exercises to tone your belly. Do Kegel exercises to regain strength in your pelvic muscles. You can do these exercises while you stand or sit. ? Squeeze the same muscles you would use to stop your urine. Your belly and thighs should not move. ? Hold the squeeze for 3 seconds, and then relax for 3 seconds. ? Start with 3 seconds. Then add 1 second each week until you are able to squeeze for 10 seconds. ? Repeat the exercise 10 to 15 times for each session. Do three or more sessions each day. · Find a class for new mothers and new babies that has an exercise time. · If you had a  birth, give yourself a bit more time before you exercise, and be careful. When should you call for help? Call  911 anytime you think you may need emergency care. For example, call if:    · You have thoughts of harming yourself, your baby, or another person.     · You passed out (lost consciousness).     · You have chest pain, are short of breath, or cough up blood.     · You have a seizure.    Call your doctor now or seek immediate medical care if:    · You have severe vaginal bleeding. This means you are passing blood clots and soaking through a pad each hour for 2 or more hours.     · You are dizzy or lightheaded, or you feel like you may faint.     · You have a fever.     · You have new or more belly pain.     · You have signs of a blood clot in your leg (called a deep vein thrombosis), such as:  ? Pain in the calf, back of the knee, thigh, or groin. ? Redness and swelling in your leg or groin.     · You have signs of preeclampsia, such as:  ? Sudden swelling of your face, hands, or feet. ? New vision problems (such as dimness, blurring, or seeing spots).   ? A severe headache.    Watch closely for changes in your health, and be sure to contact your doctor if:    · Your vaginal bleeding seems to be getting heavier.     · You have new or worse vaginal discharge.     · You feel sad, anxious, or hopeless for more than a few days.     · You do not get better as expected. Where can you learn more? Go to http://angelia-eric.info/  Enter A461 in the search box to learn more about \"After Your Delivery (the Postpartum Period): Care Instructions. \"  Current as of: May 29, 2019Content Version: 12.4  © 8914-3600 [employer]. Care instructions adapted under license by [employer] (which disclaims liability or warranty for this information). If you have questions about a medical condition or this instruction, always ask your healthcare professional. [place] [address] any warranty or liability for your use of this information. DISCHARGE SUMMARY from Nurse    PATIENT INSTRUCTIONS:    After general anesthesia or intravenous sedation, for 24 hours or while taking prescription Narcotics:  · Limit your activities  · Do not drive and operate hazardous machinery  · Do not make important personal or business decisions  · Do  not drink alcoholic beverages  · If you have not urinated within 8 hours after discharge, please contact your surgeon on call. Report the following to your surgeon:  · Excessive pain, swelling, redness or odor of or around the surgical area  · Temperature over 100.5  · Nausea and vomiting lasting longer than 4 hours or if unable to take medications  · Any signs of decreased circulation or nerve impairment to extremity: change in color, persistent  numbness, tingling, coldness or increase pain  · Any questions    What to do at Home:    Nothing in the vagina for 6 weeks. Call MD if experiencing heavy vaginal bleeding greater than 1 pad per hour, temperature over 100.4, foul smelling vaginal discharge, thoughts of suicide or homicide, symptoms of postpartum depression or mastitis, or any other major medical concerns.             *  Please give a list of your current medications to your Primary Care Provider. *  Please update this list whenever your medications are discontinued, doses are      changed, or new medications (including over-the-counter products) are added. *  Please carry medication information at all times in case of emergency situations. These are general instructions for a healthy lifestyle:    No smoking/ No tobacco products/ Avoid exposure to second hand smoke  Surgeon General's Warning:  Quitting smoking now greatly reduces serious risk to your health. Obesity, smoking, and sedentary lifestyle greatly increases your risk for illness    A healthy diet, regular physical exercise & weight monitoring are important for maintaining a healthy lifestyle    You may be retaining fluid if you have a history of heart failure or if you experience any of the following symptoms:  Weight gain of 3 pounds or more overnight or 5 pounds in a week, increased swelling in our hands or feet or shortness of breath while lying flat in bed. Please call your doctor as soon as you notice any of these symptoms; do not wait until your next office visit. The discharge information has been reviewed with the patient. The patient verbalized understanding. Discharge medications reviewed with the patient and appropriate educational materials and side effects teaching were provided.   ___________________________________________________________________________________________________________________________________

## 2020-08-12 NOTE — DISCHARGE SUMMARY
Obstetrical Discharge Summary     Name: Kamille Elizabeth MRN: [id number]  SSN: [id number]    YOB: 1990  Age: 35 y.o. Sex: female      Allergies: Amoxicillin    Admit Date: 8/10/2020    Discharge Date: 2020     Admitting Physician: Garrett Guillermo MD     Attending Physician:  Rubio Jackson MD     * Admission Diagnoses: 39 weeks gestation of pregnancy [Z3A.39]; Encounter for induction of labor [Z34.90]; Group beta Strep positive [B95.1]    * Discharge Diagnoses:   Information for the patient's :  Dorene Bermudez [[id number]]   Delivery of a 7 lb 15 oz (3.6 kg) female infant via Vaginal, Spontaneous on 8/10/2020 at 4:56 PM  by Ankur Olmedo. Apgars were 7  and 9 .        Additional Diagnoses:   Hospital Problems as of 2020 Date Reviewed: 2020          Codes Class Noted - Resolved POA    39 weeks gestation of pregnancy ICD-10-CM: Z3A.39  ICD-9-CM: V22.2  8/10/2020 - Present Yes        Group beta Strep positive ICD-10-CM: B95.1  ICD-9-CM: 041.02  8/10/2020 - Present Yes        * (Principal) Normal labor ICD-10-CM: O80, Z37.9  ICD-9-CM: 338  8/10/2020 - Present Yes        Anemia affecting pregnancy ICD-10-CM: O99.019  ICD-9-CM: 648.20, 285.9  6/15/2020 - Present Yes        Supervision of normal pregnancy ICD-10-CM: Z34.90  ICD-9-CM: V22.1  2020 - Present Yes             Lab Results   Component Value Date/Time    ABO/Rh(D) A POSITIVE 08/10/2020 01:01 PM    Rubella, External 1.53 11/15/2017    GrBStrep, External Negative 2018    ABO,Rh A Positive 11/15/2017      Immunization History   Administered Date(s) Administered    Influenza Vaccine (Quad) Mdck Pf 2017    Influenza Vaccine (Quad) PF 2019    Tdap 2018       * Procedures:     [place]  Depression Scale  I have been able to laugh and see the funny side of things: As much as I always could  I have looked forward with enjoyment to things: As much as I ever did  I have blamed myself unnecessarily when things went wrong: Not very often  I have been anxious or worried for no good reason: No, not at all  I have felt scared or panicky for no very good reason: No, not at all  Things have been getting on top of me: No, I have been coping as well as ever  I have been so unhappy that I have had difficulty sleeping: No, not at all  I have felt sad or miserable: No, not at all  I have been so unhappy that I have been crying: Only occasionally  The thought of harming myself has occurred to me: Never  Total Score: 2    * Discharge Condition: good    [facility] Course: Normal hospital course following the delivery. * Disposition: Home    Discharge Medications:   Current Discharge Medication List      START taking these medications    Details   ibuprofen (MOTRIN) 800 mg tablet Take 1 Tab by mouth every six (6) hours as needed for Pain. Qty: 90 Tab, Refills: 1      oxyCODONE IR (ROXICODONE) 5 mg immediate release tablet Take 1 Tab by mouth every four (4) hours as needed for Pain for up to 3 days. Max Daily Amount: 30 mg.  Qty: 10 Tab, Refills: 0    Associated Diagnoses: Anemia affecting pregnancy in third trimester         CONTINUE these medications which have NOT CHANGED    Details   ferrous sulfate 325 mg (65 mg iron) tablet Take 1 Tab by mouth Daily (before breakfast). Qty: 30 Tab, Refills: 12      ascorbic acid, vitamin C, (VITAMIN C) 500 mg tablet Take 1 Tab by mouth daily. Qty: 30 Tab, Refills: 12      [id number] omar-folic-dha (PRENATAL DHA+COMPLETE PRENATAL) -300 mg-mcg-mg cmpk Take  by mouth. * Follow-up Care/Patient Instructions:   Activity: No sex for 6 weeks, No driving while on analgesics and No heavy lifting for 6 weeks  Diet: Regular Diet  Wound Care: Keep wound clean and dry    Follow-up Information     Follow up With Specialties Details Why Contact Info    None    None [phone]) Patient stated that they have no PCP

## 2022-03-09 ENCOUNTER — APPOINTMENT (OUTPATIENT)
Dept: CT IMAGING | Age: 32
End: 2022-03-09
Attending: EMERGENCY MEDICINE
Payer: COMMERCIAL

## 2022-03-09 ENCOUNTER — HOSPITAL ENCOUNTER (EMERGENCY)
Age: 32
Discharge: HOME | End: 2022-03-09
Attending: EMERGENCY MEDICINE
Payer: COMMERCIAL

## 2022-03-09 VITALS
HEART RATE: 98 BPM | SYSTOLIC BLOOD PRESSURE: 99 MMHG | HEIGHT: 68 IN | DIASTOLIC BLOOD PRESSURE: 50 MMHG | RESPIRATION RATE: 22 BRPM | BODY MASS INDEX: 17.43 KG/M2 | TEMPERATURE: 98 F | OXYGEN SATURATION: 99 % | WEIGHT: 115 LBS

## 2022-03-09 DIAGNOSIS — I26.93 SINGLE SUBSEGMENTAL PULMONARY EMBOLISM WITHOUT ACUTE COR PULMONALE (HCC): Primary | ICD-10-CM

## 2022-03-09 LAB
ATRIAL RATE: 65 BPM
BNP SERPL-MCNC: 116 PG/ML (ref 5–125)
CALCULATED P AXIS, ECG09: 77 DEGREES
CALCULATED R AXIS, ECG10: 85 DEGREES
CALCULATED T AXIS, ECG11: 66 DEGREES
COVID-19 RAPID TEST, COVR: NOT DETECTED
D DIMER PPP FEU-MCNC: 0.8 UG/ML(FEU)
DIAGNOSIS, 93000: NORMAL
P-R INTERVAL, ECG05: 132 MS
Q-T INTERVAL, ECG07: 380 MS
QRS DURATION, ECG06: 92 MS
QTC CALCULATION (BEZET), ECG08: 395 MS
SOURCE, COVRS: NORMAL
TROPONIN-HIGH SENSITIVITY: 7.6 PG/ML (ref 0–14)
VENTRICULAR RATE, ECG03: 65 BPM

## 2022-03-09 PROCEDURE — 93005 ELECTROCARDIOGRAM TRACING: CPT | Performed by: EMERGENCY MEDICINE

## 2022-03-09 PROCEDURE — 74011000636 HC RX REV CODE- 636: Performed by: EMERGENCY MEDICINE

## 2022-03-09 PROCEDURE — 71260 CT THORAX DX C+: CPT

## 2022-03-09 PROCEDURE — 96372 THER/PROPH/DIAG INJ SC/IM: CPT

## 2022-03-09 PROCEDURE — 99285 EMERGENCY DEPT VISIT HI MDM: CPT

## 2022-03-09 PROCEDURE — 84484 ASSAY OF TROPONIN QUANT: CPT

## 2022-03-09 PROCEDURE — 87635 SARS-COV-2 COVID-19 AMP PRB: CPT

## 2022-03-09 PROCEDURE — 74011250636 HC RX REV CODE- 250/636: Performed by: EMERGENCY MEDICINE

## 2022-03-09 PROCEDURE — 74011000258 HC RX REV CODE- 258: Performed by: EMERGENCY MEDICINE

## 2022-03-09 PROCEDURE — 85379 FIBRIN DEGRADATION QUANT: CPT

## 2022-03-09 PROCEDURE — 83880 ASSAY OF NATRIURETIC PEPTIDE: CPT

## 2022-03-09 RX ORDER — ENOXAPARIN SODIUM 100 MG/ML
50 INJECTION SUBCUTANEOUS EVERY 12 HOURS
Qty: 1 EACH | Refills: 0 | Status: SHIPPED | OUTPATIENT
Start: 2022-03-09 | End: 2022-03-28 | Stop reason: SDUPTHER

## 2022-03-09 RX ORDER — SODIUM CHLORIDE 0.9 % (FLUSH) 0.9 %
10 SYRINGE (ML) INJECTION
Status: COMPLETED | OUTPATIENT
Start: 2022-03-09 | End: 2022-03-09

## 2022-03-09 RX ORDER — ENOXAPARIN SODIUM 100 MG/ML
1 INJECTION SUBCUTANEOUS ONCE
Status: COMPLETED | OUTPATIENT
Start: 2022-03-09 | End: 2022-03-09

## 2022-03-09 RX ADMIN — ENOXAPARIN SODIUM 50 MG: 100 INJECTION SUBCUTANEOUS at 16:17

## 2022-03-09 RX ADMIN — Medication 10 ML: at 15:14

## 2022-03-09 RX ADMIN — SODIUM CHLORIDE 100 ML: 9 INJECTION, SOLUTION INTRAVENOUS at 15:15

## 2022-03-09 RX ADMIN — IOPAMIDOL 100 ML: 755 INJECTION, SOLUTION INTRAVENOUS at 15:14

## 2022-03-09 NOTE — ED NOTES
I have reviewed discharge instructions with the patient. The patient verbalized understanding. Patient left ED via Discharge Method: ambulatory to Home with family  Opportunity for questions and clarification provided. Patient given 1 scripts. To continue your aftercare when you leave the hospital, you may receive an automated call from our care team to check in on how you are doing. This is a free service and part of our promise to provide the best care and service to meet your aftercare needs.  If you have questions, or wish to unsubscribe from this service please call [phone]. Thank you for Choosing our [facility] Emergency Department.

## 2022-03-09 NOTE — ED PROVIDER NOTES
28year-old G3, P2 at 9 weeks gestation presents with shortness of breath since last night. Symptoms worsen with standing and activity. Denies cough, chest pain, congestion, fever, hemoptysis, leg swelling, history of DVT or PE. No known Covid exposure. Was seen in urgent care and had normal labs. Urgent care discussed with her OB and was advised to come to the emergency department for further evaluation. Shortness of Breath  Pertinent negatives include no fever, no headaches, no cough, no chest pain, no vomiting, no abdominal pain and no rash.         Past Medical History:   Diagnosis Date    Anemia affecting pregnancy 6/15/2020    Chronic migraine     PCOS (polycystic ovarian syndrome)        Past Surgical History:   Procedure Laterality Date    HX WISDOM TEETH EXTRACTION  02/2017         Family History:   Problem Relation Age of Onset    Thyroid Disease Mother     Migraines Father     Alzheimer's Disease Maternal Grandfather     Heart Disease Maternal Grandfather     Lung Disease Maternal Grandfather     Colon Cancer Neg Hx     Ovarian Cancer Neg Hx     Breast Cancer Neg Hx     Diabetes Neg Hx     Hypertension Neg Hx        Social History     Socioeconomic History    Marital status:      Spouse name: Not on file    Number of children: Not on file    Years of education: Not on file    Highest education level: Not on file   Occupational History    Not on file   Tobacco Use    Smoking status: Never Smoker    Smokeless tobacco: Never Used   Substance and Sexual Activity    Alcohol use: No    Drug use: Never    Sexual activity: Yes     Partners: Male     Birth control/protection: None, Condom   Other Topics Concern     Service Not Asked    Blood Transfusions Not Asked    Caffeine Concern Not Asked    Occupational Exposure Not Asked    Hobby Hazards Not Asked    Sleep Concern Not Asked    Stress Concern Not Asked    Weight Concern Not Asked    Special Diet Not Asked    Back Care Not Asked    Exercise Not Asked    Bike Helmet Not Asked   [address] Not Asked    Self-Exams Not Asked   Social History Narrative    Not on file     Social Determinants of Health     Financial Resource Strain:     Difficulty of Paying Living Expenses: Not on file   Food Insecurity:     Worried About Running Out of Food in the Last Year: Not on file    Gillian of Food in the Last Year: Not on file   Transportation Needs:     Lack of Transportation (Medical): Not on file    Lack of Transportation (Non-Medical): Not on file   Physical Activity:     Days of Exercise per Week: Not on file    Minutes of Exercise per Session: Not on file   Stress:     Feeling of Stress : Not on file   Social Connections:     Frequency of Communication with Friends and Family: Not on file    Frequency of Social Gatherings with Friends and Family: Not on file    Attends Mandaeism Services: Not on file    Active Member of [address] [place] [address] or Organizations: Not on file    Attends Club or Organization Meetings: Not on file    Marital Status: Not on file   Intimate Partner Violence:     Fear of Current or Ex-Partner: Not on file    Emotionally Abused: Not on file    Physically Abused: Not on file    Sexually Abused: Not on file   Housing Stability:     Unable to Pay for Housing in the Last Year: Not on file    Number of [place] in the Last Year: Not on file    Unstable Housing in the Last Year: Not on file         ALLERGIES: Amoxicillin    Review of Systems   Constitutional: Negative for fever. HENT: Negative for hearing loss. Eyes: Negative for visual disturbance. Respiratory: Positive for shortness of breath. Negative for cough. Cardiovascular: Negative for chest pain. Gastrointestinal: Negative for abdominal pain, diarrhea, nausea and vomiting. Musculoskeletal: Negative for back pain. Skin: Negative for rash. Neurological: Negative for headaches.    Psychiatric/Behavioral: Negative for confusion. All other systems reviewed and are negative. Vitals:    03/09/22 1148   BP: 115/76   Pulse: 82   Resp: 16   Temp: 98 °F (36.7 °C)   SpO2: 100%   Weight: 52.2 kg (115 lb)   Height: 5' 8\" (1.727 m)            Physical Exam  Vitals and nursing note reviewed. Constitutional:       Appearance: Normal appearance. She is well-developed. HENT:      Head: Normocephalic and atraumatic. Nose: Nose normal.      Mouth/Throat:      Mouth: Mucous membranes are moist.   Eyes:      Pupils: Pupils are equal, round, and reactive to light. Cardiovascular:      Rate and Rhythm: Regular rhythm. Heart sounds: Normal heart sounds. Pulmonary:      Effort: Pulmonary effort is normal.      Breath sounds: Normal breath sounds. Abdominal:      Palpations: Abdomen is soft. Tenderness: There is no abdominal tenderness. Musculoskeletal:         General: No deformity. Normal range of motion. Cervical back: Normal range of motion and neck supple. Skin:     General: Skin is warm and dry. Neurological:      General: No focal deficit present. Mental Status: She is alert. Mental status is at baseline. Psychiatric:         Mood and Affect: Mood normal.         Behavior: Behavior normal.          MDM  Number of Diagnoses or Management Options  Diagnosis management comments: Parts of this document were created using dragon voice recognition software. The chart has been reviewed but errors may still be present. I wore appropriate PPE throughout this patient's ED visit. Mora Kilpatrick MD, 12:09 PM    Appears dyspneic. Sats 1%. Lungs clear. Labs reviewed urgent care. WBC 5.9, hemoglobin 13.8, hematocrit 40.6, platelets 510, sodium 138, potassium 4.1, chloride 102, CO2 28, BUN 9, creatinine 0.6, glucose 92    4:05 PM  Small segmental right lower lobe pulmonary embolism. No sign of right heart strain or need for admission. Will start on Lovenox. Advised close OB follow-up.      I discussed the results of all labs, procedures, radiographs, and treatments with the patient and available family. Treatment plan is agreed upon and the patient is ready for discharge. Questions about treatment in the ED and differential diagnosis of presenting condition were answered. Patient was given verbal discharge instructions including, but not limited to, importance of returning to the emergency department for any concern of worsening or continued symptoms. Instructions were given to follow up with a primary care provider or specialist within 1-2 days. Adverse effects of medications, if prescribed, were discussed and patient was advised to refrain from significant physical activity until followed up by primary care physician and to not drive or operate heavy machinery after taking any sedating substances.            Amount and/or Complexity of Data Reviewed  Clinical lab tests: ordered and reviewed (Results for orders placed or performed during the hospital encounter of 03/09/22  -COVID-19 RAPID TEST:        Result                      Value             Ref Range           Specimen source             NASAL SWAB                            COVID-19 rapid test         Not detected      NOTD           -D DIMER:        Result                      Value             Ref Range           D DIMER                     0.80 (H)          <0.56 ug/ml(*  -TROPONIN-HIGH SENSITIVITY:        Result                      Value             Ref Range           Troponin-High Sensitiv*     7.6               0 - 14 pg/mL   -NT-PRO BNP:        Result                      Value             Ref Range           NT pro-BNP                  116               5 - 125 PG/ML  -EKG, 12 LEAD, INITIAL:        Result                      Value             Ref Range           Ventricular Rate            65                BPM                 Atrial Rate                 65                BPM                 P-R Interval                132               ms QRS Duration                92                ms                  Q-T Interval                380               ms                  QTC Calculation (Bezet)     395               ms                  Calculated P Axis           77                degrees             Calculated R Axis           85                degrees             Calculated T Axis           66                degrees             Diagnosis                                                     !! AGE AND GENDER SPECIFIC ECG ANALYSIS !! Normal sinus rhythm   Possible Left atrial enlargement   Borderline ECG   No previous ECGs available   Confirmed by Aubrey Casey MD (), Augustus Nissen ([id number]) on 3/9/2022 3:04:24 PM     )  Tests in the radiology section of CPT®: ordered and reviewed (CT CHEST PULMONARY EMBOLISM    Result Date: 3/9/2022  EXAMINATION: CT CHEST- PULMONARY ANGIOGRAM 3/9/2022 3:25 PM ACCESSION NUMBER: [id number] INDICATION: sob, elevated dimer, 9 weeks pregnant COMPARISON: None available TECHNIQUE: Multiple contiguous axial CT images of the chest were obtained from the lung apices to the lung bases after the intravenous administration of 100 cc Isovue-370 contrast material per pulmonary angiography protocol. Coronal reconstructions were performed. Radiation dose reduction techniques were used for this study:  Our CT scanners use one or all of the following: Automated exposure control, adjustment of the mA and/or kVp according to patient's size, iterative reconstruction. FINDINGS: PULMONARY ARTERIES: There is a subsegmental pulmonary material filling defect at the right lung base (axial image 78 and coronal image 101). THORACIC AORTA: Unremarkable PROXIMAL GREAT VESSELS: Unremarkable HEART: Unremarkable PERICARDIUM: Unremarkable MEDIASTINAL LYMPH NODES: Unremarkable HILAR LYMPH NODES: Unremarkable AXILLARY LYMPH NODES: Unremarkable PULMONARY PARENCHYMA: Unremarkable PLEURA: No pneumothorax. No pleural effusion.  VISUALIZED UPPER ABDOMEN: There is no free intraperitoneal gas in the included portions of the upper abdomen. OSSEOUS STRUCTURES: No suspicious lytic or blastic bony lesions. Single subsegmental pulmonary embolism at the right lung base.  Discussed with Dr. Rolf Enrique by Dr. Norma Mock at 4:03 PM 3/9/2022.     )  Tests in the medicine section of CPT®: reviewed and ordered           EKG    Date/Time: 3/9/2022 12:34 PM  Performed by: Alferdo Beltran MD  Authorized by: Alfredo Beltran MD     ECG reviewed by ED Physician in the absence of a cardiologist: yes    Interpretation:     Interpretation: non-specific    Rate:     ECG rate:  65    ECG rate assessment: normal    Rhythm:     Rhythm: sinus rhythm    Ectopy:     Ectopy: none    Conduction:     Conduction: normal    ST segments:     ST segments:  Non-specific  T waves:     T waves: normal    Other findings:     Other findings: LAE

## 2022-03-09 NOTE — ED TRIAGE NOTES
Pt states she is 9 weeks pregnant, 3rd pregnancy. Pt reports she had shortness of breath since yesterday evening. Pt states she was sent here by urgent care. Pt reports anemia with previous pregnancy, states hgb was ok today. Pt reports recent nasal congestion/cold.     Denis Dinh RN

## 2022-03-09 NOTE — DISCHARGE INSTRUCTIONS
You have been given a prescription for the first 20 days of your blood thinner. You must follow-up with OB for further refills. Return for worsening or concerning symptoms.

## 2022-03-13 ENCOUNTER — HOSPITAL ENCOUNTER (OUTPATIENT)
Age: 32
Setting detail: OBSERVATION
Discharge: HOME | End: 2022-03-14
Attending: EMERGENCY MEDICINE | Admitting: FAMILY MEDICINE
Payer: COMMERCIAL

## 2022-03-13 DIAGNOSIS — O88.211 PULMONARY EMBOLISM AFFECTING PREGNANCY IN FIRST TRIMESTER: Primary | ICD-10-CM

## 2022-03-13 DIAGNOSIS — O88.219: ICD-10-CM

## 2022-03-13 DIAGNOSIS — Z3A.10 10 WEEKS GESTATION OF PREGNANCY: Chronic | ICD-10-CM

## 2022-03-13 PROBLEM — G43.001 MIGRAINE WITHOUT AURA WITH STATUS MIGRAINOSUS: Status: ACTIVE | Noted: 2020-01-08

## 2022-03-13 LAB
ALBUMIN SERPL-MCNC: 3.9 G/DL (ref 3.5–5)
ALBUMIN/GLOB SERPL: 0.9 {RATIO} (ref 1.2–3.5)
ALP SERPL-CCNC: 60 U/L (ref 50–136)
ALT SERPL-CCNC: 38 U/L (ref 12–65)
ANION GAP SERPL CALC-SCNC: 9 MMOL/L (ref 7–16)
AST SERPL-CCNC: 19 U/L (ref 15–37)
ATRIAL RATE: 79 BPM
BASOPHILS # BLD: 0 K/UL (ref 0–0.2)
BASOPHILS NFR BLD: 0 % (ref 0–2)
BILIRUB SERPL-MCNC: 0.3 MG/DL (ref 0.2–1.1)
BNP SERPL-MCNC: 80 PG/ML (ref 5–125)
BUN SERPL-MCNC: 9 MG/DL (ref 6–23)
CALCIUM SERPL-MCNC: 9.9 MG/DL (ref 8.3–10.4)
CALCULATED P AXIS, ECG09: 74 DEGREES
CALCULATED R AXIS, ECG10: 84 DEGREES
CALCULATED T AXIS, ECG11: 38 DEGREES
CHLORIDE SERPL-SCNC: 105 MMOL/L (ref 98–107)
CO2 SERPL-SCNC: 23 MMOL/L (ref 21–32)
CREAT SERPL-MCNC: 0.64 MG/DL (ref 0.6–1)
DIAGNOSIS, 93000: NORMAL
DIFFERENTIAL METHOD BLD: NORMAL
EOSINOPHIL # BLD: 0.1 K/UL (ref 0–0.8)
EOSINOPHIL NFR BLD: 1 % (ref 0.5–7.8)
ERYTHROCYTE [DISTWIDTH] IN BLOOD BY AUTOMATED COUNT: 12.4 % (ref 11.9–14.6)
GLOBULIN SER CALC-MCNC: 4.2 G/DL (ref 2.3–3.5)
GLUCOSE SERPL-MCNC: 92 MG/DL (ref 65–100)
HCT VFR BLD AUTO: 42.9 % (ref 35.8–46.3)
HGB BLD-MCNC: 14.8 G/DL (ref 11.7–15.4)
IMM GRANULOCYTES # BLD AUTO: 0 K/UL (ref 0–0.5)
IMM GRANULOCYTES NFR BLD AUTO: 0 % (ref 0–5)
INR PPP: 0.9
LYMPHOCYTES # BLD: 2.7 K/UL (ref 0.5–4.6)
LYMPHOCYTES NFR BLD: 29 % (ref 13–44)
MCH RBC QN AUTO: 29.2 PG (ref 26.1–32.9)
MCHC RBC AUTO-ENTMCNC: 34.5 G/DL (ref 31.4–35)
MCV RBC AUTO: 84.8 FL (ref 79.6–97.8)
MONOCYTES # BLD: 0.6 K/UL (ref 0.1–1.3)
MONOCYTES NFR BLD: 6 % (ref 4–12)
NEUTS SEG # BLD: 5.9 K/UL (ref 1.7–8.2)
NEUTS SEG NFR BLD: 63 % (ref 43–78)
NRBC # BLD: 0 K/UL (ref 0–0.2)
P-R INTERVAL, ECG05: 134 MS
PLATELET # BLD AUTO: 269 K/UL (ref 150–450)
PMV BLD AUTO: 9.8 FL (ref 9.4–12.3)
POTASSIUM SERPL-SCNC: 4.1 MMOL/L (ref 3.5–5.1)
PROT SERPL-MCNC: 8.1 G/DL (ref 6.3–8.2)
PROTHROMBIN TIME: 13 SEC (ref 12.6–14.5)
Q-T INTERVAL, ECG07: 362 MS
QRS DURATION, ECG06: 88 MS
QTC CALCULATION (BEZET), ECG08: 415 MS
RBC # BLD AUTO: 5.06 M/UL (ref 4.05–5.2)
SODIUM SERPL-SCNC: 137 MMOL/L (ref 136–145)
TROPONIN-HIGH SENSITIVITY: 6 PG/ML (ref 0–14)
VENTRICULAR RATE, ECG03: 79 BPM
WBC # BLD AUTO: 9.4 K/UL (ref 4.3–11.1)

## 2022-03-13 PROCEDURE — 74011000250 HC RX REV CODE- 250: Performed by: FAMILY MEDICINE

## 2022-03-13 PROCEDURE — 99285 EMERGENCY DEPT VISIT HI MDM: CPT

## 2022-03-13 PROCEDURE — 84484 ASSAY OF TROPONIN QUANT: CPT

## 2022-03-13 PROCEDURE — 83880 ASSAY OF NATRIURETIC PEPTIDE: CPT

## 2022-03-13 PROCEDURE — G0378 HOSPITAL OBSERVATION PER HR: HCPCS

## 2022-03-13 PROCEDURE — 93005 ELECTROCARDIOGRAM TRACING: CPT | Performed by: EMERGENCY MEDICINE

## 2022-03-13 PROCEDURE — 85610 PROTHROMBIN TIME: CPT

## 2022-03-13 PROCEDURE — 85025 COMPLETE CBC W/AUTO DIFF WBC: CPT

## 2022-03-13 PROCEDURE — 74011250636 HC RX REV CODE- 250/636: Performed by: FAMILY MEDICINE

## 2022-03-13 PROCEDURE — 93005 ELECTROCARDIOGRAM TRACING: CPT | Performed by: PHYSICIAN ASSISTANT

## 2022-03-13 PROCEDURE — 74011250636 HC RX REV CODE- 250/636: Performed by: EMERGENCY MEDICINE

## 2022-03-13 PROCEDURE — 96372 THER/PROPH/DIAG INJ SC/IM: CPT

## 2022-03-13 PROCEDURE — 80053 COMPREHEN METABOLIC PANEL: CPT

## 2022-03-13 RX ORDER — ACETAMINOPHEN 325 MG/1
650 TABLET ORAL
Status: DISCONTINUED | OUTPATIENT
Start: 2022-03-13 | End: 2022-03-14 | Stop reason: HOSPADM

## 2022-03-13 RX ORDER — SODIUM CHLORIDE 0.9 % (FLUSH) 0.9 %
5-40 SYRINGE (ML) INJECTION AS NEEDED
Status: DISCONTINUED | OUTPATIENT
Start: 2022-03-13 | End: 2022-03-14 | Stop reason: HOSPADM

## 2022-03-13 RX ORDER — ACETAMINOPHEN 650 MG/1
650 SUPPOSITORY RECTAL
Status: DISCONTINUED | OUTPATIENT
Start: 2022-03-13 | End: 2022-03-14 | Stop reason: HOSPADM

## 2022-03-13 RX ORDER — ONDANSETRON 2 MG/ML
4 INJECTION INTRAMUSCULAR; INTRAVENOUS
Status: DISCONTINUED | OUTPATIENT
Start: 2022-03-13 | End: 2022-03-14 | Stop reason: HOSPADM

## 2022-03-13 RX ORDER — ONDANSETRON 4 MG/1
4 TABLET, ORALLY DISINTEGRATING ORAL
Status: DISCONTINUED | OUTPATIENT
Start: 2022-03-13 | End: 2022-03-14 | Stop reason: HOSPADM

## 2022-03-13 RX ORDER — POLYETHYLENE GLYCOL 3350 17 G/17G
17 POWDER, FOR SOLUTION ORAL DAILY PRN
Status: DISCONTINUED | OUTPATIENT
Start: 2022-03-13 | End: 2022-03-14 | Stop reason: HOSPADM

## 2022-03-13 RX ORDER — ENOXAPARIN SODIUM 100 MG/ML
50 INJECTION SUBCUTANEOUS EVERY 12 HOURS
Status: DISCONTINUED | OUTPATIENT
Start: 2022-03-13 | End: 2022-03-14 | Stop reason: HOSPADM

## 2022-03-13 RX ORDER — SODIUM CHLORIDE 0.9 % (FLUSH) 0.9 %
5-40 SYRINGE (ML) INJECTION EVERY 8 HOURS
Status: DISCONTINUED | OUTPATIENT
Start: 2022-03-13 | End: 2022-03-14 | Stop reason: HOSPADM

## 2022-03-13 RX ADMIN — ENOXAPARIN SODIUM 50 MG: 100 INJECTION SUBCUTANEOUS at 21:16

## 2022-03-13 RX ADMIN — SODIUM CHLORIDE, PRESERVATIVE FREE 10 ML: 5 INJECTION INTRAVENOUS at 21:19

## 2022-03-13 RX ADMIN — SODIUM CHLORIDE 1000 ML: 900 INJECTION, SOLUTION INTRAVENOUS at 16:09

## 2022-03-13 NOTE — ASSESSMENT & PLAN NOTE
Worsening shortness of breath while on therapeutic enoxaparin.  -Continue enoxaparin  -Consult hematology

## 2022-03-13 NOTE — H&P
Hospitalist History and Physical   Admit Date:  3/13/2022  1:18 PM   Name:  Fredy Moore   Age:  28 y.o. Sex:  female  :  1990   MRN:  [id number]   Room:  [facility]    Presenting Complaint: No chief complaint on file. Reason(s) for Admission: Blood clot embolism during pregnancy, antepartum [O88.219]     History of Present Illness:   Fredy Moore is a 28 y.o. female with medical history of pregnancy, pulmonary embolism diagnosed 3/9 on therapeutic enoxaparin who presented with worsening shortness of breath. Prior to the day of admission she was able to relieve her shortness of breath with positional changes. Beginning on the day of admission she was no longer able to change her position to relieve her shortness of breath and discomfort. She was scheduled to have her first ultrasound of her pregnancy tomorrow. She denies headache, vision changes, palpitations, chest pain, nausea, vomiting, diarrhea, changes in urine, peripheral edema, chills, fever. In the emergency department she is not hypoxic but she is tachycardic. She was admitted for observation. Review of Systems:  10 systems reviewed and negative except as noted in HPI.   Assessment & Plan:   * Blood clot embolism during pregnancy, antepartum  Worsening shortness of breath while on therapeutic enoxaparin.  -Continue enoxaparin  -Consult hematology    10 weeks gestation of pregnancy  Has not yet had first ultrasound.  -Consult obstetrics      Dispo/Discharge Planning:   Possibly home in 1 to 2 days    Diet: No diet orders on file  VTE ppx: Enoxaparin  Code status: Prior    Hospital Problems as of 3/13/2022 Date Reviewed: 3/13/2022          Codes Class Noted - Resolved POA    Blood clot embolism during pregnancy, antepartum ICD-10-CM: O88.219  ICD-9-CM: 673.23  3/13/2022 - Present Unknown              Past History:  Past Medical History:   Diagnosis Date    Anemia affecting pregnancy 6/15/2020    Chronic migraine     PCOS (polycystic ovarian syndrome)      Past Surgical History:   Procedure Laterality Date    HX WISDOM TEETH EXTRACTION  02/2017      Allergies   Allergen Reactions    Amoxicillin Hives      Social History     Tobacco Use    Smoking status: Never Smoker    Smokeless tobacco: Never Used   Substance Use Topics    Alcohol use: No      Family History   Problem Relation Age of Onset    Thyroid Disease Mother     Migraines Father     Alzheimer's Disease Maternal Grandfather     Heart Disease Maternal Grandfather     Lung Disease Maternal Grandfather     Colon Cancer Neg Hx     Ovarian Cancer Neg Hx     Breast Cancer Neg Hx     Diabetes Neg Hx     Hypertension Neg Hx       Family history reviewed and negative except as noted above. Immunization History   Administered Date(s) Administered    Influenza Vaccine (Quad) Mdck Pf (>2 Yrs Flucelvax QUAD [id number]) 12/26/2017    Influenza Vaccine (Quad) PF (>6 Mo Flulaval, Fluarix, and >3 Yrs Aryan Vlad [id number]) 11/12/2019    Tdap 05/14/2018     Prior to Admit Medications:  Current Outpatient Medications   Medication Instructions    enoxaparin (LOVENOX) 50 mg, SubCUTAneous, EVERY 12 HOURS    multivitamin (ONE A DAY) tablet 1 Tablet, Oral, DAILY       Objective:     Patient Vitals for the past 24 hrs:   Temp Pulse Resp BP SpO2   03/13/22 1606 -- 83 18 (!) 103/59 98 %   03/13/22 1407 -- -- -- -- 99 %   03/13/22 1251 97.9 °F (36.6 °C) 91 20 107/69 100 %     Oxygen Therapy  O2 Sat (%): 98 % (03/13/22 1606)  O2 Device: None (03/13/22 1606)    Estimated body mass index is 17.49 kg/m² as calculated from the following:    Height as of this encounter: 5' 8\" (1.727 m). Weight as of this encounter: 52.2 kg (115 lb). No intake or output data in the 24 hours ending 03/13/22 1637      Blood pressure (!) 103/59, pulse 83, temperature 97.9 °F (36.6 °C), resp.  rate 18, height 5' 8\" (1.727 m), weight 52.2 kg (115 lb), last menstrual period 01/03/2022, SpO2 98 %, currently breastfeeding. Physical Exam  Vitals and nursing note reviewed. Constitutional:       General: She is not in acute distress. Appearance: Normal appearance. She is not ill-appearing. HENT:      Head: Normocephalic. Eyes:      Extraocular Movements: Extraocular movements intact. Cardiovascular:      Rate and Rhythm: Regular rhythm. Tachycardia present. Pulses:           Radial pulses are 2+ on the left side. Heart sounds: No murmur heard. No friction rub. No gallop. Pulmonary:      Effort: Pulmonary effort is normal. No respiratory distress. Breath sounds: No wheezing, rhonchi or rales. Abdominal:      General: Abdomen is flat. There is no distension. Tenderness: There is no abdominal tenderness. Musculoskeletal:         General: No deformity. Cervical back: No rigidity. Right lower leg: No edema. Left lower leg: No edema. Skin:     General: Skin is warm and dry. Neurological:      General: No focal deficit present. Mental Status: She is alert and oriented to person, place, and time. Psychiatric:         Mood and Affect: Mood is anxious. Behavior: Behavior normal.         I have reviewed ordered lab tests and independently visualized imaging below:    Last 24hr Labs:  Recent Results (from the past 24 hour(s))   EKG, 12 LEAD, INITIAL    Collection Time: 03/13/22 12:42 PM   Result Value Ref Range    Ventricular Rate 79 BPM    Atrial Rate 79 BPM    P-R Interval 134 ms    QRS Duration 88 ms    Q-T Interval 362 ms    QTC Calculation (Bezet) 415 ms    Calculated P Axis 74 degrees    Calculated R Axis 84 degrees    Calculated T Axis 38 degrees    Diagnosis       !! AGE AND GENDER SPECIFIC ECG ANALYSIS !!   Sinus rhythm with marked sinus arrhythmia  ST abnormality, possible digitalis effect  Abnormal ECG  When compared with ECG of 09-MAR-2022 12:16,  No significant change was found  Confirmed by Valeria Mckay ([id number]) on 3/13/2022 4:31:49 PM PROTHROMBIN TIME + INR    Collection Time: 03/13/22  1:28 PM   Result Value Ref Range    Prothrombin time 13.0 12.6 - 14.5 sec    INR 0.9     CBC WITH AUTOMATED DIFF    Collection Time: 03/13/22  1:33 PM   Result Value Ref Range    WBC 9.4 4.3 - 11.1 K/uL    RBC 5.06 4.05 - 5.2 M/uL    HGB 14.8 11.7 - 15.4 g/dL    HCT 42.9 35.8 - 46.3 %    MCV 84.8 79.6 - 97.8 FL    MCH 29.2 26.1 - 32.9 PG    MCHC 34.5 31.4 - 35.0 g/dL    RDW 12.4 11.9 - 14.6 %    PLATELET 135 778 - 092 K/uL    MPV 9.8 9.4 - 12.3 FL    ABSOLUTE NRBC 0.00 0.0 - 0.2 K/uL    DF AUTOMATED      NEUTROPHILS 63 43 - 78 %    LYMPHOCYTES 29 13 - 44 %    MONOCYTES 6 4.0 - 12.0 %    EOSINOPHILS 1 0.5 - 7.8 %    BASOPHILS 0 0.0 - 2.0 %    IMMATURE GRANULOCYTES 0 0.0 - 5.0 %    ABS. NEUTROPHILS 5.9 1.7 - 8.2 K/UL    ABS. LYMPHOCYTES 2.7 0.5 - 4.6 K/UL    ABS. MONOCYTES 0.6 0.1 - 1.3 K/UL    ABS. EOSINOPHILS 0.1 0.0 - 0.8 K/UL    ABS. BASOPHILS 0.0 0.0 - 0.2 K/UL    ABS. IMM. GRANS. 0.0 0.0 - 0.5 K/UL   METABOLIC PANEL, COMPREHENSIVE    Collection Time: 03/13/22  1:33 PM   Result Value Ref Range    Sodium 137 136 - 145 mmol/L    Potassium 4.1 3.5 - 5.1 mmol/L    Chloride 105 98 - 107 mmol/L    CO2 23 21 - 32 mmol/L    Anion gap 9 7 - 16 mmol/L    Glucose 92 65 - 100 mg/dL    BUN 9 6 - 23 MG/DL    Creatinine 0.64 0.6 - 1.0 MG/DL    GFR est AA >60 >60 ml/min/1.73m2    GFR est non-AA >60 >60 ml/min/1.73m2    Calcium 9.9 8.3 - 10.4 MG/DL    Bilirubin, total 0.3 0.2 - 1.1 MG/DL    ALT (SGPT) 38 12 - 65 U/L    AST (SGOT) 19 15 - 37 U/L    Alk.  phosphatase 60 50 - 136 U/L    Protein, total 8.1 6.3 - 8.2 g/dL    Albumin 3.9 3.5 - 5.0 g/dL    Globulin 4.2 (H) 2.3 - 3.5 g/dL    A-G Ratio 0.9 (L) 1.2 - 3.5     TROPONIN-HIGH SENSITIVITY    Collection Time: 03/13/22  1:33 PM   Result Value Ref Range    Troponin-High Sensitivity 6.0 0 - 14 pg/mL   NT-PRO BNP    Collection Time: 03/13/22  1:33 PM   Result Value Ref Range    NT pro-BNP 80 5 - 125 PG/ML       All Micro Results None          Other Studies:  No results found.         Signed:  Silvestre Schroeder MD

## 2022-03-13 NOTE — ED PROVIDER NOTES
60-year-old female w/ hx of PCOS who is currently 9 weeks pregnant presents with complaint of worsening shortness of breath over the past several days. Patient was diagnosed with single segmental pulmonary embolism at right lung base on 3/9/22 and started on Lovenox which she has been compliant with. Patient denies chest pain, hemoptysis, cough, fever, chills, abdominal pain, vaginal bleeding, vaginal discharge, pelvic pain, dysuria, hematuria, leakage of fluids. Patient states that her OB/GYN is Dr. Carrie Anna. Denies history of previous DVT or PE in the past.  Denies history of tobacco use. Denies history of asthma, COPD. Denies LE edema or calf TTP. States that she previously was able to feel less short of breath with leaning forward when she was initially diagnosed with PE but states that that is not helping her symptoms anymore. Denies pain with deep breathing. The history is provided by the patient. No  was used. Shortness of Breath  This is a new problem. The problem occurs continuously. The current episode started more than 2 days ago. The problem has been gradually worsening. Pertinent negatives include no fever, no headaches, no rhinorrhea, no sore throat, no neck pain, no cough, no wheezing, no chest pain, no vomiting, no abdominal pain, no rash and no leg swelling.         Past Medical History:   Diagnosis Date    Anemia affecting pregnancy 6/15/2020    Chronic migraine     PCOS (polycystic ovarian syndrome)        Past Surgical History:   Procedure Laterality Date    HX WISDOM TEETH EXTRACTION  02/2017         Family History:   Problem Relation Age of Onset    Thyroid Disease Mother     Migraines Father     Alzheimer's Disease Maternal Grandfather     Heart Disease Maternal Grandfather     Lung Disease Maternal Grandfather     Colon Cancer Neg Hx     Ovarian Cancer Neg Hx     Breast Cancer Neg Hx     Diabetes Neg Hx     Hypertension Neg Hx        Social History Socioeconomic History    Marital status:      Spouse name: Not on file    Number of children: Not on file    Years of education: Not on file    Highest education level: Not on file   Occupational History    Not on file   Tobacco Use    Smoking status: Never Smoker    Smokeless tobacco: Never Used   Substance and Sexual Activity    Alcohol use: No    Drug use: Never    Sexual activity: Yes     Partners: Male     Birth control/protection: None, Condom   Other Topics Concern     Service Not Asked    Blood Transfusions Not Asked    Caffeine Concern Not Asked    Occupational Exposure Not Asked    Hobby Hazards Not Asked    Sleep Concern Not Asked    Stress Concern Not Asked    Weight Concern Not Asked    Special Diet Not Asked    Back Care Not Asked    Exercise Not Asked    Bike Helmet Not Asked    Seat Belt Not Asked    Self-Exams Not Asked   Social History Narrative    Not on file     Social Determinants of Health     Financial Resource Strain:     Difficulty of Paying Living Expenses: Not on file   Food Insecurity:     Worried About Running Out of Food in the Last Year: Not on file    Gillian of Food in the Last Year: Not on file   Transportation Needs:     Lack of Transportation (Medical): Not on file    Lack of Transportation (Non-Medical):  Not on file   Physical Activity:     Days of Exercise per Week: Not on file    Minutes of Exercise per Session: Not on file   Stress:     Feeling of Stress : Not on file   Social Connections:     Frequency of Communication with Friends and Family: Not on file    Frequency of Social Gatherings with Friends and Family: Not on file    Attends Jew Services: Not on file    Active Member of Clubs or Organizations: Not on file    Attends Club or Organization Meetings: Not on file    Marital Status: Not on file   Intimate Partner Violence:     Fear of Current or Ex-Partner: Not on file    Emotionally Abused: Not on file  Physically Abused: Not on file    Sexually Abused: Not on file   Housing Stability:     Unable to Pay for Housing in the Last Year: Not on file    Number of Places Lived in the Last Year: Not on file    Unstable Housing in the Last Year: Not on file         ALLERGIES: Amoxicillin    Review of Systems   Constitutional: Positive for fatigue. Negative for chills, diaphoresis and fever. HENT: Negative for congestion, rhinorrhea and sore throat. Respiratory: Positive for shortness of breath. Negative for cough, wheezing and stridor. Cardiovascular: Negative for chest pain, palpitations and leg swelling. Gastrointestinal: Negative for abdominal distention, abdominal pain, blood in stool, constipation, diarrhea, nausea and vomiting. Genitourinary: Negative for dysuria, flank pain, hematuria, pelvic pain, vaginal bleeding, vaginal discharge and vaginal pain. Musculoskeletal: Negative for arthralgias, back pain, myalgias, neck pain and neck stiffness. Skin: Negative for color change, pallor and rash. Neurological: Negative for dizziness, syncope, weakness, light-headedness, numbness and headaches. Hematological: Does not bruise/bleed easily. Psychiatric/Behavioral: Negative for confusion. Vitals:    03/13/22 1251   BP: 107/69   Pulse: 91   Resp: 20   Temp: 97.9 °F (36.6 °C)   SpO2: 100%   Weight: 52.2 kg (115 lb)   Height: 5' 8\" (1.727 m)            Physical Exam  Vitals and nursing note reviewed. Constitutional:       Appearance: Normal appearance. HENT:      Head: Normocephalic. Nose: Nose normal.      Mouth/Throat:      Mouth: Mucous membranes are moist.   Eyes:      Extraocular Movements: Extraocular movements intact. Pupils: Pupils are equal, round, and reactive to light. Cardiovascular:      Rate and Rhythm: Normal rate. Pulses: Normal pulses. Heart sounds: Normal heart sounds.    Pulmonary:      Effort: Pulmonary effort is normal.      Breath sounds: Normal breath sounds. Comments: CTAB. Abdominal:      General: Bowel sounds are normal.      Palpations: Abdomen is soft. Tenderness: There is no abdominal tenderness. There is no right CVA tenderness, left CVA tenderness, guarding or rebound. Comments: Soft, nontender, nondistended no rebound or guarding. Musculoskeletal:         General: No swelling or tenderness. Normal range of motion. Comments: Thin, long extremities. No LE edema. No calf TTP. No palpable cords. Skin:     General: Skin is warm. Findings: No bruising or rash. Neurological:      General: No focal deficit present. Mental Status: She is alert and oriented to person, place, and time. Cranial Nerves: No cranial nerve deficit. Motor: No weakness. MDM  Number of Diagnoses or Management Options  Pulmonary embolism affecting pregnancy in first trimester: new and requires workup  Diagnosis management comments: Ultrasound with IUP. Fetal cardiac activity present. Patient with CT chest performed on Wednesday. Discussed concerns with obtaining repeat imaging and increased radiation exposure to fetus. Patient sats at 95 to 100% on room air. With ambulation sats around 95% and heart rate increases to 130s and patient appears significantly short of breath and tachypneic. Discussed continuation of Lovenox and discharge home versus observation. Patient states that she feels uncomfortable being discharged home given her symptoms. Case discussed with hospitalist.  States that they will observe her and will consult hematology for further work-up. Discussed case with Dr. Palacios who will place observation orders.        Amount and/or Complexity of Data Reviewed  Clinical lab tests: ordered and reviewed  Tests in the radiology section of CPT®: reviewed  Tests in the medicine section of CPT®: ordered and reviewed  Review and summarize past medical records: yes  Discuss the patient with other providers: yes  Independent visualization of images, tracings, or specimens: yes    Risk of Complications, Morbidity, and/or Mortality  Presenting problems: moderate  Diagnostic procedures: moderate  Management options: moderate  General comments: Results Include:    Recent Results (from the past 24 hour(s))  -EKG, 12 LEAD, INITIAL:   Collection Time: 03/13/22 12:42 PM       Result                      Value             Ref Range           Ventricular Rate            79                BPM                 Atrial Rate                 79                BPM                 P-R Interval                134               ms                  QRS Duration                88                ms                  Q-T Interval                362               ms                  QTC Calculation (Bezet)     415               ms                  Calculated P Axis           74                degrees             Calculated R Axis           84                degrees             Calculated T Axis           38                degrees             Diagnosis                                                     !! AGE AND GENDER SPECIFIC ECG ANALYSIS !!  Sinus rhythm with marked sinus arrhythmia ST abnormality, possible digitalis effect Abnormal ECG When compared with ECG of 09-MAR-2022 12:16, No significant change was found   -PROTHROMBIN TIME + INR:   Collection Time: 03/13/22  1:28 PM       Result                      Value             Ref Range           Prothrombin time            13.0              12.6 - 14.5 *       INR                         0.9                              -CBC WITH AUTOMATED DIFF:   Collection Time: 03/13/22  1:33 PM       Result                      Value             Ref Range           WBC                         9.4               4.3 - 11.1 K*       RBC                         5.06              4.05 - 5.2 M*       HGB                         14.8              11.7 - 15.4 *       HCT                         42.9 35.8 - 46.3 %       MCV                         84.8              79.6 - 97.8 *       MCH                         29.2              26.1 - 32.9 *       MCHC                        34.5              31.4 - 35.0 *       RDW                         12.4              11.9 - 14.6 %       PLATELET                    269               150 - 450 K/*       MPV                         9.8               9.4 - 12.3 FL       ABSOLUTE NRBC               0.00              0.0 - 0.2 K/*       DF                          AUTOMATED                             NEUTROPHILS                 63                43 - 78 %           LYMPHOCYTES                 29                13 - 44 %           MONOCYTES                   6                 4.0 - 12.0 %        EOSINOPHILS                 1                 0.5 - 7.8 %         BASOPHILS                   0                 0.0 - 2.0 %         IMMATURE GRANULOCYTES       0                 0.0 - 5.0 %         ABS. NEUTROPHILS            5.9               1.7 - 8.2 K/*       ABS. LYMPHOCYTES            2.7               0.5 - 4.6 K/*       ABS. MONOCYTES              0.6               0.1 - 1.3 K/*       ABS. EOSINOPHILS            0.1               0.0 - 0.8 K/*       ABS. BASOPHILS              0.0               0.0 - 0.2 K/*       ABS. IMM.  GRANS.            0.0               0.0 - 0.5 K/*  -METABOLIC PANEL, COMPREHENSIVE:   Collection Time: 03/13/22  1:33 PM       Result                      Value             Ref Range           Sodium                      137               136 - 145 mm*       Potassium                   4.1               3.5 - 5.1 mm*       Chloride                    105               98 - 107 mmo*       CO2                         23                21 - 32 mmol*       Anion gap                   9                 7 - 16 mmol/L       Glucose                     92                65 - 100 mg/*       BUN                         9                 6 - 23 MG/DL        Creatinine 0.64              0.6 - 1.0 MG*       GFR est AA                  >60               >60 ml/min/1*       GFR est non-AA              >60               >60 ml/min/1*       Calcium                     9.9               8.3 - 10.4 M*       Bilirubin, total            0.3               0.2 - 1.1 MG*       ALT (SGPT)                  38                12 - 65 U/L         AST (SGOT)                  19                15 - 37 U/L         Alk. phosphatase            60                50 - 136 U/L        Protein, total              8.1               6.3 - 8.2 g/*       Albumin                     3.9               3.5 - 5.0 g/*       Globulin                    4.2 (H)           2.3 - 3.5 g/*       A-G Ratio                   0.9 (L)           1.2 - 3.5      -TROPONIN-HIGH SENSITIVITY:   Collection Time: 03/13/22  1:33 PM       Result                      Value             Ref Range           Troponin-High Sensitiv*     6.0               0 - 14 pg/mL       Patient Progress  Patient progress: stable    ED Course as of 03/13/22 1612   Sun Mar 13, 2022   1546 Troponin-High Sensitivity: 6.0 [DF]   1546 While ambulating sats 95%. Heart rate 136. Pt tachypneic and appears SOB. [DF]      ED Course User Index  [DF] Diane Separs MD       EKG    Date/Time: 3/13/2022 2:06 PM  Performed by: Diane Spears MD  Authorized by: Diane Spears MD     ECG reviewed by ED Physician in the absence of a cardiologist: yes    Rate:     ECG rate:  79    ECG rate assessment: normal    Rhythm:     Rhythm: sinus rhythm    Ectopy:     Ectopy: none    QRS:     QRS axis:  Normal    QRS intervals:  Normal  Conduction:     Conduction: normal    ST segments:     ST segments:  Normal  T waves:     T waves: normal              Cornelius Manzanares MD; 3/13/2022 @4:17 PM Voice dictation software was used during the making of this note.   This software is not perfect and grammatical and other typographical errors may be present.   This note has not been proofread for errors.  ===================================================================

## 2022-03-13 NOTE — ED NOTES
Patient presents complaining of increased shortness of breath. Reports recently diagnosed with PE and placed on Lovenox. Patient reports she is 9 weeks pregnant. Patient reports managing symptoms at home by resting and \"leaning forward\" but reports persistent shortness of breat today and states she is unable to take deep breath.

## 2022-03-13 NOTE — PROGRESS NOTES
03/13/22 1819   Dual Skin Pressure Injury Assessment   Dual Skin Pressure Injury Assessment WDL   Second Care Provider (Based on [address] [place] [address]) KAN Moore   Skin Integumentary   Skin Integumentary (WDL) WDL    Pressure  Injury Documentation No Pressure Injury Noted-Pressure Ulcer Prevention Initiated

## 2022-03-13 NOTE — ED TRIAGE NOTES
Pt states she was dx with a PE on Wednesday and was told to come back to the ER if the SOB got worse which it has. Denies chest pain. Pt has been using Lovenox at home twice daily. Masked for triage.

## 2022-03-14 VITALS
RESPIRATION RATE: 12 BRPM | WEIGHT: 115 LBS | BODY MASS INDEX: 17.43 KG/M2 | OXYGEN SATURATION: 99 % | HEART RATE: 96 BPM | DIASTOLIC BLOOD PRESSURE: 59 MMHG | TEMPERATURE: 98.8 F | HEIGHT: 68 IN | SYSTOLIC BLOOD PRESSURE: 103 MMHG

## 2022-03-14 LAB
ERYTHROCYTE [DISTWIDTH] IN BLOOD BY AUTOMATED COUNT: 12.6 % (ref 11.9–14.6)
HCT VFR BLD AUTO: 39.1 % (ref 35.8–46.3)
HGB BLD-MCNC: 13.4 G/DL (ref 11.7–15.4)
MCH RBC QN AUTO: 29.7 PG (ref 26.1–32.9)
MCHC RBC AUTO-ENTMCNC: 34.3 G/DL (ref 31.4–35)
MCV RBC AUTO: 86.7 FL (ref 79.6–97.8)
NRBC # BLD: 0 K/UL (ref 0–0.2)
PLATELET # BLD AUTO: 249 K/UL (ref 150–450)
PMV BLD AUTO: 10.2 FL (ref 9.4–12.3)
RBC # BLD AUTO: 4.51 M/UL (ref 4.05–5.2)
WBC # BLD AUTO: 6.3 K/UL (ref 4.3–11.1)

## 2022-03-14 PROCEDURE — APPNB15 APP NON BILLABLE TIME 0-15 MINS: Performed by: NURSE PRACTITIONER

## 2022-03-14 PROCEDURE — 99233 SBSQ HOSP IP/OBS HIGH 50: CPT | Performed by: OBSTETRICS & GYNECOLOGY

## 2022-03-14 PROCEDURE — 36415 COLL VENOUS BLD VENIPUNCTURE: CPT

## 2022-03-14 PROCEDURE — 99222 1ST HOSP IP/OBS MODERATE 55: CPT | Performed by: INTERNAL MEDICINE

## 2022-03-14 PROCEDURE — 74011250636 HC RX REV CODE- 250/636: Performed by: FAMILY MEDICINE

## 2022-03-14 PROCEDURE — 85027 COMPLETE CBC AUTOMATED: CPT

## 2022-03-14 PROCEDURE — G0378 HOSPITAL OBSERVATION PER HR: HCPCS

## 2022-03-14 PROCEDURE — 74011000250 HC RX REV CODE- 250: Performed by: FAMILY MEDICINE

## 2022-03-14 PROCEDURE — 96372 THER/PROPH/DIAG INJ SC/IM: CPT

## 2022-03-14 RX ADMIN — SODIUM CHLORIDE, PRESERVATIVE FREE 10 ML: 5 INJECTION INTRAVENOUS at 14:32

## 2022-03-14 RX ADMIN — ENOXAPARIN SODIUM 50 MG: 100 INJECTION SUBCUTANEOUS at 09:19

## 2022-03-14 RX ADMIN — SODIUM CHLORIDE, PRESERVATIVE FREE 10 ML: 5 INJECTION INTRAVENOUS at 05:40

## 2022-03-14 NOTE — CONSULTS
[facility]: Inpatient Hematology / Oncology Consult Note    Reason for Consult:    Referring Physician:  Sukhwinder Peters MD    History of Present Illness:  Ms. Paul Cabral is a 28 y.o. female admitted on 3/13/2022 with a primary diagnosis of   Encounter Diagnosis   Name Primary?  Pulmonary embolism affecting pregnancy in first trimester Yes     Ms Paul Cabral is a very pleasant 33yo woman admitted on 3/13/2022 with primary diagnosis of PE affecting pregnancy in first trimester. She is about 9-10 weeks pregnant. Currently on Lovenox for newly diagnosed PE. She noted some worsening shortness of breath over the last few days and was sent back to the emergency room. Original CT showed single segmental PE at right lung base on 3/9/2022. She reported no chest pain, hemoptysis, fever, abdominal pain or vaginal bleeding. She is scheduled with GYN for first ultrasound soon. She has no history of prior VTE. She is a non-smoker. She did report having cold symptoms 3 days prior to developing shortness of breath. Covid was negative when she presented to the ED. We are consulted for pulmonary embolism in pregnancy.     Review of Systems:  Constitutional Denies fever or chills. Denies weight loss or appetite changes. Denies fatigue. Denies night sweats. HEENT Denies trauma, blurry vision, hearing loss, ear pain, nosebleeds, sore throat, neck pain and ear discharge. Skin Denies lesions or rashes. Lungs + dyspnea, mild cough, no sputum production or hemoptysis. Cardiovascular Denies chest pain, palpitations, or lower extremity edema. Gastrointestinal Denies nausea or vomiting. Denies changes in bowel habits. Denies bloody or black stools. Denies abdominal pain.  Denies dysuria, frequency or hesitancy of urination. Neuro Denies headaches, visual changes or ataxia.  Denies dizziness, tingling, tremors, sensory change, speech change, focal weakness   Hematology Denies easy bruising or bleeding, denies gingival bleeding or epistaxis. Endo Denies heat/cold intolerance   MSK Denies back pain, arthralgias, myalgias or frequent falls. Psychiatric/Behavioral Denies depression and substance abuse. The patient is not nervous/anxious.          Allergies   Allergen Reactions    Amoxicillin Hives     Past Medical History:   Diagnosis Date    Anemia affecting pregnancy 6/15/2020    Chronic migraine     PCOS (polycystic ovarian syndrome)      Past Surgical History:   Procedure Laterality Date    HX WISDOM TEETH EXTRACTION  02/2017     Family History   Problem Relation Age of Onset    Thyroid Disease Mother     Migraines Father     Alzheimer's Disease Maternal Grandfather     Heart Disease Maternal Grandfather     Lung Disease Maternal Grandfather     Colon Cancer Neg Hx     Ovarian Cancer Neg Hx     Breast Cancer Neg Hx     Diabetes Neg Hx     Hypertension Neg Hx      Social History     Socioeconomic History    Marital status:      Spouse name: Not on file    Number of children: Not on file    Years of education: Not on file    Highest education level: Not on file   Occupational History    Not on file   Tobacco Use    Smoking status: Never Smoker    Smokeless tobacco: Never Used   Substance and Sexual Activity    Alcohol use: No    Drug use: Never    Sexual activity: Yes     Partners: Male     Birth control/protection: None, Condom   Other Topics Concern     Service Not Asked    Blood Transfusions Not Asked    Caffeine Concern Not Asked    Occupational Exposure Not Asked    Hobby Hazards Not Asked    Sleep Concern Not Asked    Stress Concern Not Asked    Weight Concern Not Asked    Special Diet Not Asked    Back Care Not Asked    Exercise Not Asked    Bike Helmet Not Asked   [address] Not Asked    Self-Exams Not Asked   Social History Narrative    Not on file     Social Determinants of Health     Financial Resource Strain:     Difficulty of Paying Living Expenses: Not on file   Food Insecurity:     Worried About [facility] in the Last Year: Not on file    Gillian of Food in the Last Year: Not on file   Transportation Needs:     Lack of Transportation (Medical): Not on file    Lack of Transportation (Non-Medical):  Not on file   Physical Activity:     Days of Exercise per Week: Not on file    Minutes of Exercise per Session: Not on file   Stress:     Feeling of Stress : Not on file   Social Connections:     Frequency of Communication with Friends and Family: Not on file    Frequency of Social Gatherings with Friends and Family: Not on file    Attends Yarsanism Services: Not on file    Active Member of [address] [place] [address] or Organizations: Not on file    Attends Club or Organization Meetings: Not on file    Marital Status: Not on file   Intimate Partner Violence:     Fear of Current or Ex-Partner: Not on file    Emotionally Abused: Not on file    Physically Abused: Not on file    Sexually Abused: Not on file   Housing Stability:     Unable to Pay for Housing in the Last Year: Not on file    Number of [place] in the Last Year: Not on file    Unstable Housing in the Last Year: Not on file     Current Facility-Administered Medications   Medication Dose Route Frequency Provider Last Rate Last Admin    enoxaparin (LOVENOX) injection 50 mg  50 mg SubCUTAneous Q12H Queenie Wynn MD   50 mg at 03/14/22 0919    sodium chloride (NS) flush 5-40 mL  5-40 mL IntraVENous Q8H Be Finnegan MD   10 mL at 03/14/22 0540    sodium chloride (NS) flush 5-40 mL  5-40 mL IntraVENous PRN Be Finnegan MD        acetaminophen (TYLENOL) tablet 650 mg  650 mg Oral Q6H PRN Queenie Wynn MD        Or    acetaminophen (TYLENOL) suppository 650 mg  650 mg Rectal Q6H PRN Be Finnegan MD        polyethylene glycol (MIRALAX) packet 17 g  17 g Oral DAILY PRN Queenie Wynn MD        ondansetron (ZOFRAN ODT) tablet 4 mg  4 mg Oral Q8H PRN Jacinta Henriquez MD        Or    ondansetron [facility]) injection 4 mg  4 mg IntraVENous Q6H PRN Jacinta Henriquez MD           OBJECTIVE:  Patient Vitals for the past 8 hrs:   BP Temp Pulse Resp SpO2   22 0731 100/65 98.1 °F (36.7 °C) 82 12 100 %   22 0313 96/60 97.9 °F (36.6 °C) 75 16 98 %     Temp (24hrs), Av °F (36.7 °C), Min:97.8 °F (36.6 °C), Max:98.1 °F (36.7 °C)    No intake/output data recorded. Physical Exam:  Constitutional: ECOG - 0  Well developed, thin/tall female in no acute distress, sitting comfortably in the hospital bed. HEENT: Normocephalic and atraumatic. Oropharynx is clear, mucous membranes are moist.  Lips are dry. Pupils are equal, round, and reactive to light. Extraocular muscles are intact. Sclerae anicteric. Lymph node   No palpable submandibular, cervical, supraclavicular lymph nodes. Skin Warm and dry. No bruising and no rash noted. No erythema. No pallor. Respiratory Lungs are clear to auscultation bilaterally without wheezes, rales or rhonchi, normal air exchange without accessory muscle use. CVS Normal rate, regular rhythm and normal S1 and S2. No murmur   Abdomen Soft, nontender    Neuro Grossly nonfocal with no obvious sensory or motor deficits. MSK Normal range of motion in general.  No edema and no tenderness. Psych Appropriate mood and affect. Labs:    Recent Results (from the past 24 hour(s))   EKG, 12 LEAD, INITIAL    Collection Time: 22 12:42 PM   Result Value Ref Range    Ventricular Rate 79 BPM    Atrial Rate 79 BPM    P-R Interval 134 ms    QRS Duration 88 ms    Q-T Interval 362 ms    QTC Calculation (Bezet) 415 ms    Calculated P Axis 74 degrees    Calculated R Axis 84 degrees    Calculated T Axis 38 degrees    Diagnosis       !! AGE AND GENDER SPECIFIC ECG ANALYSIS !!   Sinus rhythm with marked sinus arrhythmia  ST abnormality, possible digitalis effect  Abnormal ECG  When compared with ECG of 09-MAR-2022 12:16,  No significant change was found  Confirmed by Erwin Hernandez ([id number]) on 3/13/2022 4:31:49 PM     PROTHROMBIN TIME + INR    Collection Time: 03/13/22  1:28 PM   Result Value Ref Range    Prothrombin time 13.0 12.6 - 14.5 sec    INR 0.9     CBC WITH AUTOMATED DIFF    Collection Time: 03/13/22  1:33 PM   Result Value Ref Range    WBC 9.4 4.3 - 11.1 K/uL    RBC 5.06 4.05 - 5.2 M/uL    HGB 14.8 11.7 - 15.4 g/dL    HCT 42.9 35.8 - 46.3 %    MCV 84.8 79.6 - 97.8 FL    MCH 29.2 26.1 - 32.9 PG    MCHC 34.5 31.4 - 35.0 g/dL    RDW 12.4 11.9 - 14.6 %    PLATELET 155 104 - 269 K/uL    MPV 9.8 9.4 - 12.3 FL    ABSOLUTE NRBC 0.00 0.0 - 0.2 K/uL    DF AUTOMATED      NEUTROPHILS 63 43 - 78 %    LYMPHOCYTES 29 13 - 44 %    MONOCYTES 6 4.0 - 12.0 %    EOSINOPHILS 1 0.5 - 7.8 %    BASOPHILS 0 0.0 - 2.0 %    IMMATURE GRANULOCYTES 0 0.0 - 5.0 %    ABS. NEUTROPHILS 5.9 1.7 - 8.2 K/UL    ABS. LYMPHOCYTES 2.7 0.5 - 4.6 K/UL    ABS. MONOCYTES 0.6 0.1 - 1.3 K/UL    ABS. EOSINOPHILS 0.1 0.0 - 0.8 K/UL    ABS. BASOPHILS 0.0 0.0 - 0.2 K/UL    ABS. IMM. GRANS. 0.0 0.0 - 0.5 K/UL   METABOLIC PANEL, COMPREHENSIVE    Collection Time: 03/13/22  1:33 PM   Result Value Ref Range    Sodium 137 136 - 145 mmol/L    Potassium 4.1 3.5 - 5.1 mmol/L    Chloride 105 98 - 107 mmol/L    CO2 23 21 - 32 mmol/L    Anion gap 9 7 - 16 mmol/L    Glucose 92 65 - 100 mg/dL    BUN 9 6 - 23 MG/DL    Creatinine 0.64 0.6 - 1.0 MG/DL    GFR est AA >60 >60 ml/min/1.73m2    GFR est non-AA >60 >60 ml/min/1.73m2    Calcium 9.9 8.3 - 10.4 MG/DL    Bilirubin, total 0.3 0.2 - 1.1 MG/DL    ALT (SGPT) 38 12 - 65 U/L    AST (SGOT) 19 15 - 37 U/L    Alk.  phosphatase 60 50 - 136 U/L    Protein, total 8.1 6.3 - 8.2 g/dL    Albumin 3.9 3.5 - 5.0 g/dL    Globulin 4.2 (H) 2.3 - 3.5 g/dL    A-G Ratio 0.9 (L) 1.2 - 3.5     TROPONIN-HIGH SENSITIVITY    Collection Time: 03/13/22  1:33 PM   Result Value Ref Range    Troponin-High Sensitivity 6.0 0 - 14 pg/mL   NT-PRO BNP    Collection Time: 03/13/22  1:33 PM   Result Value Ref Range    NT pro-BNP 80 5 - 125 PG/ML   CBC W/O DIFF    Collection Time: 03/14/22 10:06 AM   Result Value Ref Range    WBC 6.3 4.3 - 11.1 K/uL    RBC 4.51 4.05 - 5.2 M/uL    HGB 13.4 11.7 - 15.4 g/dL    HCT 39.1 35.8 - 46.3 %    MCV 86.7 79.6 - 97.8 FL    MCH 29.7 26.1 - 32.9 PG    MCHC 34.3 31.4 - 35.0 g/dL    RDW 12.6 11.9 - 14.6 %    PLATELET 644 236 - 953 K/uL    MPV 10.2 9.4 - 12.3 FL    ABSOLUTE NRBC 0.00 0.0 - 0.2 K/uL       Imaging: reviewed     ASSESSMENT:    Principal Problem:    Blood clot embolism during pregnancy, antepartum (3/13/2022)    Active Problems:    10 weeks gestation of pregnancy (8/10/2020)        PLAN / RECOMMENDATIONS:  Lab studies and imaging studies were personally reviewed. Pertinent old records were reviewed. 1. PE in pregnancy s/p CT and no suspicion for DVT in LE   -During today's visit we discussed the need to continue subcutaneous low molecular weight heparin. She is already on Lovenox and will continue this for the duration of pregnancy and most likely for 6 weeks postpartum. Genetic testing can be completed anytime to determine if there is a hypercoag predisposition. Molecular testing can be checked 3 months postdelivery as many of the hypercoagulable testing can be affected by pregnancy itself. This was reviewed with the patient. If she gets discharged today, we can certainly complete this in an outpatient setting and she will be scheduled for a follow-up with me upon discharge. She is aware that she will be seeing me at the cancer center for hematology concerns. She reported no history of VTE, and no family history of thrombosis. Prior pregnancies without issues. Thank you for allowing me to participate in the care of Ms. Thuan Hurtado.         Kota Good MD  [facility]  [address] [place] [address]  Office : [phone]  Fax : [phone]

## 2022-03-14 NOTE — CONSULTS
3/14/2022      Scott Correa  1990      31 y. [id number] seen at the request of medicine hospitalist for PE in early pregnancy. Viable pregnancy confirmed by ER bedside US. Pt has had 2 uncomplicated pregnancies. No fam hx DVT/PE. No known hx covid. Had cold symptoms which started 8d ago. Symptoms improved. Had SOB Tues (5d ago). Presented to ER the next day. CT showed PE. Pt was started on Lovenox. She returned and was hospitalized overnight for continued SOB. The plan is for her to go home today on current dose lovenox. PMH: o/w noncontributory    PSH: wisdom teeth extraction      Current Facility-Administered Medications:     enoxaparin (LOVENOX) injection 50 mg, 50 mg, SubCUTAneous, Q12H, Spearman, Woodward Brittle, MD, 50 mg at 22 0919    sodium chloride (NS) flush 5-40 mL, 5-40 mL, IntraVENous, Q8H, Spearman, Woodward Brittle, MD, 10 mL at 22 0540    sodium chloride (NS) flush 5-40 mL, 5-40 mL, IntraVENous, PRN, Spearman, Woodward Brittle, MD    acetaminophen (TYLENOL) tablet 650 mg, 650 mg, Oral, Q6H PRN **OR** acetaminophen (TYLENOL) suppository 650 mg, 650 mg, Rectal, Q6H PRN, Spearman, Woodward Brittle, MD    polyethylene glycol (MIRALAX) packet 17 g, 17 g, Oral, DAILY PRN, Spearman, Woodward Brittle, MD    ondansetron (ZOFRAN ODT) tablet 4 mg, 4 mg, Oral, Q8H PRN **OR** ondansetron (ZOFRAN) injection 4 mg, 4 mg, IntraVENous, Q6H PRN, Spearman, Woodward Brittle, MD    Family and social history o/w noncontributory. ROS: pertinent info in HPI    Visit Vitals  /72 (BP 1 Location: Left upper arm)   Pulse 85   Temp 97.9 °F (36.6 °C)   Resp 20   Ht 5' 8\" (1.727 m)   Wt 115 lb (52.2 kg)   LMP 2022   SpO2 100%   BMI 17.49 kg/m²     Patient in no distress. Well developed, well nourished. Sitting cross legged on bed. A&O x3. Speech and thought content appropriate. Affect bright.   HEENT: normocephalic, nontraumatic, sclerae nonicteric  Neck: supple, trachea midline  Chest: respiratory effort normal other than supraclavicular retraction when she takes a deep breath. Neuro grossly normal    Reviewed labs and imaging reports from 3-9 and 3-13/14. Assessment/Plan:   Early pregnancy. Pt is on PNV. Has appt in our office on Friday for [address]. I had originally ordered one here today, but it will be better to do this in our office. So will continue with the plan to do dating scan on Friday. PE. D/w pt that we can do repeat imaging as needed to take proper care of her. During pregnancy, we maintain pts on lovenox, switch to heparin ~36 weeks to allow for labor epidural if desired by pt. Then pts are maintained on anticoagulation (usually lovenox) for 6wks after delivery. Her pregnancy will also be followed by MFM. Pt has hematology outpt f/up scheduled.

## 2022-03-14 NOTE — MANAGEMENT PLAN
[facility]        Inpatient Hematology / Oncology Abstract    Reason for Consult:  Blood clot embolism during pregnancy, antepartum [O88.219]  Referring Physician:  Kimmy Conway MD    History of Present Illness:  Ms. Michelle Bhardwaj is a 28 y.o. female admitted on 3/13/2022 with a primary diagnosis of The encounter diagnosis was Pulmonary embolism affecting pregnancy in first trimester. Her PMH includes pregnancy (9 weeks) and PE diagnosed 3/9, on therapeutic Lovenox. She presented to ED on 3/13 with c/o worsening shortness of breath. She was scheduled to have her first ultrasound of her pregnancy today. She was noted to tachycardic in ED. No hypoxia noted, on RA. She was admitted for observation. GYN consult pending. Continues on therapeutic Lovenox. We were consulted for recommendations given worsening shortness of breath.     Allergies   Allergen Reactions    Amoxicillin Hives     Past Medical History:   Diagnosis Date    Anemia affecting pregnancy 6/15/2020    Chronic migraine     PCOS (polycystic ovarian syndrome)      Past Surgical History:   Procedure Laterality Date    HX WISDOM TEETH EXTRACTION  02/2017     Family History   Problem Relation Age of Onset    Thyroid Disease Mother     Migraines Father     Alzheimer's Disease Maternal Grandfather     Heart Disease Maternal Grandfather     Lung Disease Maternal Grandfather     Colon Cancer Neg Hx     Ovarian Cancer Neg Hx     Breast Cancer Neg Hx     Diabetes Neg Hx     Hypertension Neg Hx      Social History     Socioeconomic History    Marital status:      Spouse name: Not on file    Number of children: Not on file    Years of education: Not on file    Highest education level: Not on file   Occupational History    Not on file   Tobacco Use    Smoking status: Never Smoker    Smokeless tobacco: Never Used   Substance and Sexual Activity    Alcohol use: No    Drug use: Never    Sexual activity: Yes Partners: Male     Birth control/protection: None, Condom   Other Topics Concern     Service Not Asked    Blood Transfusions Not Asked    Caffeine Concern Not Asked    Occupational Exposure Not Asked    Hobby Hazards Not Asked    Sleep Concern Not Asked    Stress Concern Not Asked    Weight Concern Not Asked    Special Diet Not Asked    Back Care Not Asked    Exercise Not Asked    Bike Helmet Not Asked   [address] Not Asked    Self-Exams Not Asked   Social History Narrative    Not on file     Social Determinants of Health     Financial Resource Strain:     Difficulty of Paying Living Expenses: Not on file   Food Insecurity:     Worried About Running Out of Food in the Last Year: Not on file    Gillian of Food in the Last Year: Not on file   Transportation Needs:     Lack of Transportation (Medical): Not on file    Lack of Transportation (Non-Medical):  Not on file   Physical Activity:     Days of Exercise per Week: Not on file    Minutes of Exercise per Session: Not on file   Stress:     Feeling of Stress : Not on file   Social Connections:     Frequency of Communication with Friends and Family: Not on file    Frequency of Social Gatherings with Friends and Family: Not on file    Attends Christianity Services: Not on file    Active Member of [address] [place] [address] [employer] or Organizations: Not on file    Attends Club or Organization Meetings: Not on file    Marital Status: Not on file   Intimate Partner Violence:     Fear of Current or Ex-Partner: Not on file    Emotionally Abused: Not on file    Physically Abused: Not on file    Sexually Abused: Not on file   Housing Stability:     Unable to Pay for Housing in the Last Year: Not on file    Number of [place] in the Last Year: Not on file    Unstable Housing in the Last Year: Not on file     Current Facility-Administered Medications   Medication Dose Route Frequency Provider Last Rate Last Admin    enoxaparin (LOVENOX) injection 50 mg  50 mg SubCUTAneous Q12H Kumar Samson MD   50 mg at 22 0919    sodium chloride (NS) flush 5-40 mL  5-40 mL IntraVENous Q8H Alyssa Finnegan MD   10 mL at 22 0540    sodium chloride (NS) flush 5-40 mL  5-40 mL IntraVENous PRN Alyssa Finnegan MD        acetaminophen (TYLENOL) tablet 650 mg  650 mg Oral Q6H PRN Kumar Samson MD        Or    acetaminophen (TYLENOL) suppository 650 mg  650 mg Rectal Q6H PRN Alyssa Finnegan MD        polyethylene glycol (MIRALAX) packet 17 g  17 g Oral DAILY PRN Kumar Samson MD        ondansetron (ZOFRAN ODT) tablet 4 mg  4 mg Oral Q8H PRN Alyssa Finnegan MD        Or    ondansetron (ZOFRAN) injection 4 mg  4 mg IntraVENous Q6H PRN Alyssa Finnegan MD           OBJECTIVE:  Patient Vitals for the past 8 hrs:   BP Temp Pulse Resp SpO2   22 1107 107/72 97.9 °F (36.6 °C) 85 20 100 %   22 0731 100/65 98.1 °F (36.7 °C) 82 12 100 %     Temp (24hrs), Av.9 °F (36.6 °C), Min:97.8 °F (36.6 °C), Max:98.1 °F (36.7 °C)    No intake/output data recorded. Physical Exam:  Exam per Dr. Darryle Donate:    Recent Results (from the past 24 hour(s))   EKG, 12 LEAD, INITIAL    Collection Time: 22 12:42 PM   Result Value Ref Range    Ventricular Rate 79 BPM    Atrial Rate 79 BPM    P-R Interval 134 ms    QRS Duration 88 ms    Q-T Interval 362 ms    QTC Calculation (Bezet) 415 ms    Calculated P Axis 74 degrees    Calculated R Axis 84 degrees    Calculated T Axis 38 degrees    Diagnosis       !! AGE AND GENDER SPECIFIC ECG ANALYSIS !!   Sinus rhythm with marked sinus arrhythmia  ST abnormality, possible digitalis effect  Abnormal ECG  When compared with ECG of 09-MAR-2022 12:16,  No significant change was found  Confirmed by Sandra Meade ([id number]) on 3/13/2022 4:31:49 PM     PROTHROMBIN TIME + INR    Collection Time: 22  1:28 PM   Result Value Ref Range    Prothrombin time 13.0 12.6 - 14.5 sec    INR 0.9     CBC WITH AUTOMATED DIFF Collection Time: 03/13/22  1:33 PM   Result Value Ref Range    WBC 9.4 4.3 - 11.1 K/uL    RBC 5.06 4.05 - 5.2 M/uL    HGB 14.8 11.7 - 15.4 g/dL    HCT 42.9 35.8 - 46.3 %    MCV 84.8 79.6 - 97.8 FL    MCH 29.2 26.1 - 32.9 PG    MCHC 34.5 31.4 - 35.0 g/dL    RDW 12.4 11.9 - 14.6 %    PLATELET 089 000 - 994 K/uL    MPV 9.8 9.4 - 12.3 FL    ABSOLUTE NRBC 0.00 0.0 - 0.2 K/uL    DF AUTOMATED      NEUTROPHILS 63 43 - 78 %    LYMPHOCYTES 29 13 - 44 %    MONOCYTES 6 4.0 - 12.0 %    EOSINOPHILS 1 0.5 - 7.8 %    BASOPHILS 0 0.0 - 2.0 %    IMMATURE GRANULOCYTES 0 0.0 - 5.0 %    ABS. NEUTROPHILS 5.9 1.7 - 8.2 K/UL    ABS. LYMPHOCYTES 2.7 0.5 - 4.6 K/UL    ABS. MONOCYTES 0.6 0.1 - 1.3 K/UL    ABS. EOSINOPHILS 0.1 0.0 - 0.8 K/UL    ABS. BASOPHILS 0.0 0.0 - 0.2 K/UL    ABS. IMM. GRANS. 0.0 0.0 - 0.5 K/UL   METABOLIC PANEL, COMPREHENSIVE    Collection Time: 03/13/22  1:33 PM   Result Value Ref Range    Sodium 137 136 - 145 mmol/L    Potassium 4.1 3.5 - 5.1 mmol/L    Chloride 105 98 - 107 mmol/L    CO2 23 21 - 32 mmol/L    Anion gap 9 7 - 16 mmol/L    Glucose 92 65 - 100 mg/dL    BUN 9 6 - 23 MG/DL    Creatinine 0.64 0.6 - 1.0 MG/DL    GFR est AA >60 >60 ml/min/1.73m2    GFR est non-AA >60 >60 ml/min/1.73m2    Calcium 9.9 8.3 - 10.4 MG/DL    Bilirubin, total 0.3 0.2 - 1.1 MG/DL    ALT (SGPT) 38 12 - 65 U/L    AST (SGOT) 19 15 - 37 U/L    Alk.  phosphatase 60 50 - 136 U/L    Protein, total 8.1 6.3 - 8.2 g/dL    Albumin 3.9 3.5 - 5.0 g/dL    Globulin 4.2 (H) 2.3 - 3.5 g/dL    A-G Ratio 0.9 (L) 1.2 - 3.5     TROPONIN-HIGH SENSITIVITY    Collection Time: 03/13/22  1:33 PM   Result Value Ref Range    Troponin-High Sensitivity 6.0 0 - 14 pg/mL   NT-PRO BNP    Collection Time: 03/13/22  1:33 PM   Result Value Ref Range    NT pro-BNP 80 5 - 125 PG/ML   CBC W/O DIFF    Collection Time: 03/14/22 10:06 AM   Result Value Ref Range    WBC 6.3 4.3 - 11.1 K/uL    RBC 4.51 4.05 - 5.2 M/uL    HGB 13.4 11.7 - 15.4 g/dL    HCT 39.1 35.8 - 46.3 %    MCV 86.7 79.6 - 97.8 FL    MCH 29.7 26.1 - 32.9 PG    MCHC 34.3 31.4 - 35.0 g/dL    RDW 12.6 11.9 - 14.6 %    PLATELET 217 635 - 583 K/uL    MPV 10.2 9.4 - 12.3 FL    ABSOLUTE NRBC 0.00 0.0 - 0.2 K/uL       Imaging:  n/a    ASSESSMENT:  Problem List  Date Reviewed: 3/13/2022          Codes Class Noted    * (Principal) Blood clot embolism during pregnancy, antepartum ICD-10-CM: O88.219  ICD-9-CM: 673.23  3/13/2022        10 weeks gestation of pregnancy (Chronic) ICD-10-CM: Z3A.10  ICD-9-CM: V22.2  8/10/2020        Group beta Strep positive ICD-10-CM: B95.1  ICD-9-CM: 041.02  8/10/2020        Normal labor ICD-10-CM: Byron Gaster, Z37.9  ICD-9-CM: 146  8/10/2020        Anemia affecting pregnancy ICD-10-CM: O99.019  ICD-9-CM: 648.20, 285.9  6/15/2020        Third degree perineal laceration ICD-10-CM: O70.20  ICD-9-CM: 664.20  2/5/2020    Overview Signed 2/5/2020 10:04 AM by MD NEENA Erickson w/ Dr. Zen Brown             Supervision of normal pregnancy ICD-10-CM: Z34.90  ICD-9-CM: V22.1  2/5/2020        Migraine without aura with status migrainosus ICD-10-CM: G43.001  ICD-9-CM: 346.12  1/8/2020    Overview Signed 3/13/2022  4:34 PM by Niraj Donovan MD     Last Assessment & Plan:   Formatting of this note might be different from the original.  Chronic issue, worsening during 1st trimester of pregnancy. Continue tylenol for headache treatment in pregnancy. Stay hydrated, consider chiropractor. Discussed treating with tylenol + reglan or tylenol + codeine if headaches worsen. Can [place] paperwork for pt if she is eligible through work. Chronic migraine without aura ICD-10-CM: F08.593  ICD-9-CM: 346.70  9/20/2016    Overview Signed 3/13/2022  4:34 PM by Niraj Donovan MD     Last Assessment & Plan:   Formatting of this note might be different from the original.  Migraines are controlled well with imitrex. Pt is happy and wishes to continue use. Recheck in 12 months, sooner if needed. RECOMMENDATIONS:  Chart review only. Formal consult and note by Dr. Adis Parry to follow.          Stacia Mora NP   [facility]  [address] [place] [address]  Office : [phone]  Fax : [phone]

## 2022-03-14 NOTE — PROGRESS NOTES
AVS summary reviewed with patient. Pt aware of whom to call in case of emergency. IV access removed. Opportunity given to ask questions and answered. Pt verbalized understanding.

## 2022-03-14 NOTE — PROGRESS NOTES
SW met with patient, confirmed demographic information. Patient reports being independent at home, does not use assistive devices to ambulate, and denies any falls. Patient is also established with primary care and is seen regularly. No needs identified from SW at this time.      Yana Garcia LMSW    [facility]    * [email or web address]

## 2022-03-14 NOTE — PROGRESS NOTES
SW reviewed patient's chart and conducted a baseline assessment. Discharge plan at this time is as follows:     Care Management Interventions  PCP Verified by CM: Yes  Mode of Transport at Discharge: Self  Transition of Care Consult (CM Consult): Discharge Planning  Support Systems: Spouse/Significant Other  Confirm Follow Up Transport: Family  Discharge Location  Patient Expects to be Discharged to[de-identified] Home with family assistance      *Please note that discharge plans can change throughout an inpatient admission.  Ensure that you are referring to the most recent social work/nurse case management note for current discharge plan*     Bambi Rodriguez, [address] [place] [address] Work   [facility]    * [email or web address]

## 2022-03-14 NOTE — DISCHARGE SUMMARY
Hospitalist Discharge Summary   Admit Date:  3/13/2022  1:18 PM   DC Note date: 3/14/2022  Name:  Eduardo Pugh   Age:  28 y.o. Sex:  female  :  1990   MRN:  [id number]   Room:  [facility]  PCP:  DEANDRA Colón    Presenting Complaint: No chief complaint on file. Initial Admission Diagnosis: Blood clot embolism during pregnancy, antepartum [O88.219]     Problem List for this Hospitalization:  Hospital Problems as of 3/14/2022 Date Reviewed: 3/14/2022          Codes Class Noted - Resolved POA    * (Principal) Blood clot embolism during pregnancy, antepartum ICD-10-CM: O88.219  ICD-9-CM: 673.23  3/13/2022 - Present Yes        10 weeks gestation of pregnancy (Chronic) ICD-10-CM: Z3A.10  ICD-9-CM: V22.2  8/10/2020 - Present Yes            Did Patient have Sepsis (YES OR NO): No    Hospital Course:  28 y.o. female with medical history of pregnancy, pulmonary embolism diagnosed 3/9 on therapeutic enoxaparin who presented with worsening shortness of breath. Prior to the day of admission she was able to relieve her shortness of breath with positional changes. Beginning on the day of admission she was no longer able to change her position to relieve her shortness of breath and discomfort. She was scheduled to have her first ultrasound of her pregnancy tomorrow. She denies headache, vision changes, palpitations, chest pain, nausea, vomiting, diarrhea, changes in urine, peripheral edema, chills, fever. In the emergency department she is not hypoxic but she is tachycardic. She was admitted for observation. Consulted OB, hematology. Continue lovenox. Follow up with OB outpatient. Disposition: Home or Self Care  Diet: ADULT DIET Regular  Code Status: Full Code    Follow Up Orders: Follow-up Appointments   Procedures    FOLLOW UP VISIT Appointment in: 3 - 5 Days Follow up with OB provider within 5 days. Follow up with OB provider within 5 days.      Standing Status:   Standing     Number of Occurrences:   1     Order Specific Question:   Appointment in     Answer:   3 - 5 Days       Follow-up Information     Follow up With Specialties Details Why Contact Info    [address] [facility], Nazanin Car[place] Physician Assistant   [address]  [phone]            Time spent in patient discharge and coordination 37 minutes. Plan was discussed with patient, , nurse, . All questions answered. Patient was stable at time of discharge. Instructions given to call a physician or return if any concerns. Discharge Info:   Current Discharge Medication List      CONTINUE these medications which have NOT CHANGED    Details   enoxaparin (Lovenox) 60 mg/0.6 mL injection 50 mg by SubCUTAneous route every twelve (12) hours for 20 days. Qty: 1 Each, Refills: 0      multivitamin (ONE A DAY) tablet Take 1 Tab by mouth daily. Procedures done this admission:  * No surgery found *    Consults this admission:  IP CONSULT TO OB GYN  IP CONSULT TO HEMATOLOGY    Echocardiogram/EKG results:  No results found for this or any previous visit. EKG Results     Procedure 720 Value Units Date/Time    EKG 12 LEAD INITIAL [[id number]] Collected: 03/13/22 1242    Order Status: Completed Updated: 03/13/22 1631     Ventricular Rate 79 BPM      Atrial Rate 79 BPM      P-R Interval 134 ms      QRS Duration 88 ms      Q-T Interval 362 ms      QTC Calculation (Bezet) 415 ms      Calculated P Axis 74 degrees      Calculated R Axis 84 degrees      Calculated T Axis 38 degrees      Diagnosis --     !! AGE AND GENDER SPECIFIC ECG ANALYSIS !!   Sinus rhythm with marked sinus arrhythmia  ST abnormality, possible digitalis effect  Abnormal ECG  When compared with ECG of 09-MAR-2022 12:16,  No significant change was found  Confirmed by Demi Junior ([id number]) on 3/13/2022 4:31:49 PM      EKG, 12 LEAD, INITIAL [[id number]]     Order Status: Completed           Diagnostic Imaging/Tests:   No results found.     All Micro Results     None          Labs: Results:       BMP, Mg, Phos Recent Labs     03/13/22  1333      K 4.1      CO2 23   AGAP 9   BUN 9   CREA 0.64   CA 9.9   GLU 92      CBC Recent Labs     03/14/22  1006 03/13/22  1333   WBC 6.3 9.4   RBC 4.51 5.06   HGB 13.4 14.8   HCT 39.1 42.9    269   GRANS  --  63   LYMPH  --  29   EOS  --  1   MONOS  --  6   BASOS  --  0   IG  --  0   ANEU  --  5.9   ABL  --  2.7   MIRIAN  --  0.1   ABM  --  0.6   ABB  --  0.0   AIG  --  0.0      LFT Recent Labs     03/13/22  1333   ALT 38   AP 60   TP 8.1   ALB 3.9   GLOB 4.2*   AGRAT 0.9*      Cardiac Testing No results found for: BNPP, BNP, CPK, RCK1, RCK2, RCK3, RCK4, CKMB, CKNDX, CKND1, TROPT, TROIQ   Coagulation Tests Lab Results   Component Value Date/Time    Prothrombin time 13.0 03/13/2022 01:28 PM    INR 0.9 03/13/2022 01:28 PM      A1c Lab Results   Component Value Date/Time    Hemoglobin A1c 5.3 09/27/2017 09:47 AM      Lipid Panel No results found for: CHOL, CHOLPOCT, CHOLX, CHLST, CHOLV, 561709, HDL, HDLP, LDL, LDLC, DLDLP, 075878, VLDLC, VLDL, TGLX, TRIGL, TRIGP, TGLPOCT, CHHD, CHHDX   Thyroid Panel Lab Results   Component Value Date/Time    TSH 2.510 09/27/2017 09:47 AM        Most Recent UA No results found for: COLOR, APPRN, REFSG, MAMI, PROTU, GLUCU, KETU, BILU, BLDU, UROU, JANENE, LEUKU, WBCU, RBCU, UEPI, BACTU, CASTS, UCRY, MUCUS, UCOM       All Labs from Last 24 Hrs:  Recent Results (from the past 24 hour(s))   CBC W/O DIFF    Collection Time: 03/14/22 10:06 AM   Result Value Ref Range    WBC 6.3 4.3 - 11.1 K/uL    RBC 4.51 4.05 - 5.2 M/uL    HGB 13.4 11.7 - 15.4 g/dL    HCT 39.1 35.8 - 46.3 %    MCV 86.7 79.6 - 97.8 FL    MCH 29.7 26.1 - 32.9 PG    MCHC 34.3 31.4 - 35.0 g/dL    RDW 12.6 11.9 - 14.6 %    PLATELET 916 941 - 331 K/uL    MPV 10.2 9.4 - 12.3 FL    ABSOLUTE NRBC 0.00 0.0 - 0.2 K/uL       Current Med List in Hospital:   Current Facility-Administered Medications Medication Dose Route Frequency    enoxaparin (LOVENOX) injection 50 mg  50 mg SubCUTAneous Q12H    sodium chloride (NS) flush 5-40 mL  5-40 mL IntraVENous Q8H    sodium chloride (NS) flush 5-40 mL  5-40 mL IntraVENous PRN    acetaminophen (TYLENOL) tablet 650 mg  650 mg Oral Q6H PRN    Or    acetaminophen (TYLENOL) suppository 650 mg  650 mg Rectal Q6H PRN    polyethylene glycol (MIRALAX) packet 17 g  17 g Oral DAILY PRN    ondansetron (ZOFRAN ODT) tablet 4 mg  4 mg Oral Q8H PRN    Or    ondansetron (ZOFRAN) injection 4 mg  4 mg IntraVENous Q6H PRN       Allergies   Allergen Reactions    Amoxicillin Hives     Immunization History   Administered Date(s) Administered    Influenza Vaccine (Quad) Mdck Pf (>2 Yrs Flucelvax QUAD [id number]) 12/26/2017    Influenza Vaccine (Quad) PF (>6 Mo Flulaval, Fluarix, and >3 Yrs Afluria, Fluzone [id number]) 11/12/2019    Tdap 05/14/2018       Recent Vital Data:  Patient Vitals for the past 24 hrs:   Temp Pulse Resp BP SpO2   03/14/22 1541 98.8 °F (37.1 °C) 96 12 (!) 103/59 99 %   03/14/22 1107 97.9 °F (36.6 °C) 85 20 107/72 100 %   03/14/22 0731 98.1 °F (36.7 °C) 82 12 100/65 100 %   03/14/22 0313 97.9 °F (36.6 °C) 75 16 96/60 98 %   03/13/22 2311 98.1 °F (36.7 °C) 81 12 (!) 90/56 98 %   03/13/22 1927 97.9 °F (36.6 °C) 94 14 (!) 90/53 100 %   03/13/22 1819 -- -- -- -- 100 %   03/13/22 1752 97.8 °F (36.6 °C) 79 16 (!) 92/56 100 %     Oxygen Therapy  O2 Sat (%): 99 % (03/14/22 1541)  O2 Device: None (Room air) (03/13/22 1819)    Estimated body mass index is 17.49 kg/m² as calculated from the following:    Height as of this encounter: 5' 8\" (1.727 m). Weight as of this encounter: 52.2 kg (115 lb). No intake or output data in the 24 hours ending 03/14/22 1706      Physical Exam  Vitals and nursing note reviewed. Constitutional:       General: She is not in acute distress. Appearance: Normal appearance. She is not ill-appearing. HENT:      Head: Normocephalic.    Eyes: Extraocular Movements: Extraocular movements intact. Cardiovascular:      Rate and Rhythm: Normal rate. Pulmonary:      Effort: Pulmonary effort is normal. No respiratory distress. Musculoskeletal:         General: No deformity. Cervical back: No rigidity. Right lower leg: No edema. Left lower leg: No edema. Skin:     General: Skin is warm and dry. Neurological:      General: No focal deficit present. Mental Status: She is alert and oriented to person, place, and time.    Psychiatric:         Mood and Affect: Mood normal.         Behavior: Behavior normal.         Signed:  Triston Lawson MD

## 2022-03-18 PROBLEM — Z3A.10 10 WEEKS GESTATION OF PREGNANCY: Status: ACTIVE | Noted: 2020-08-10

## 2022-03-18 PROBLEM — Z37.9 NORMAL LABOR: Status: ACTIVE | Noted: 2020-08-10

## 2022-03-19 PROBLEM — G43.001 MIGRAINE WITHOUT AURA WITH STATUS MIGRAINOSUS: Status: ACTIVE | Noted: 2020-01-08

## 2022-03-19 PROBLEM — B95.1 GROUP BETA STREP POSITIVE: Status: ACTIVE | Noted: 2020-08-10

## 2022-03-20 PROBLEM — O88.219: Status: ACTIVE | Noted: 2022-03-13

## 2022-03-20 PROBLEM — Z34.90 SUPERVISION OF NORMAL PREGNANCY: Status: ACTIVE | Noted: 2020-02-05

## 2022-03-20 PROBLEM — O99.019 ANEMIA AFFECTING PREGNANCY: Status: ACTIVE | Noted: 2020-06-15

## 2022-03-29 PROBLEM — O09.291: Status: ACTIVE | Noted: 2022-03-29

## 2022-03-29 PROBLEM — O22.31: Status: ACTIVE | Noted: 2022-03-29

## 2022-03-30 PROBLEM — O88.219: Status: RESOLVED | Noted: 2022-03-13 | Resolved: 2022-03-30

## 2022-03-30 PROBLEM — Z3A.10 10 WEEKS GESTATION OF PREGNANCY: Status: RESOLVED | Noted: 2020-08-10 | Resolved: 2022-03-30

## 2022-03-30 PROBLEM — Z3A.10 10 WEEKS GESTATION OF PREGNANCY: Chronic | Status: RESOLVED | Noted: 2020-08-10 | Resolved: 2022-03-30

## 2022-03-30 PROBLEM — O09.91 HIGH-RISK PREGNANCY IN FIRST TRIMESTER: Status: ACTIVE | Noted: 2020-02-05

## 2022-03-30 PROBLEM — Z37.9 NORMAL LABOR: Status: RESOLVED | Noted: 2020-08-10 | Resolved: 2022-03-30

## 2022-03-31 PROBLEM — Z36.82 NUCHAL TRANSLUCENCY OF FETUS ON PRENATAL ULTRASOUND: Status: ACTIVE | Noted: 2022-03-31

## 2022-03-31 PROBLEM — Z3A.12 12 WEEKS GESTATION OF PREGNANCY: Status: ACTIVE | Noted: 2022-03-31

## 2022-03-31 PROBLEM — Z3A.12 12 WEEKS GESTATION OF PREGNANCY: Status: RESOLVED | Noted: 2022-03-31 | Resolved: 2022-03-31

## 2022-03-31 PROBLEM — Z36.82 NUCHAL TRANSLUCENCY OF FETUS ON PRENATAL ULTRASOUND: Status: RESOLVED | Noted: 2022-03-31 | Resolved: 2022-03-31

## 2022-03-31 PROBLEM — Z86.2 HISTORY OF ANEMIA: Status: ACTIVE | Noted: 2020-06-15

## 2022-04-01 PROBLEM — O88.211 PULMONARY EMBOLISM AFFECTING PREGNANCY IN FIRST TRIMESTER: Status: ACTIVE | Noted: 2022-03-29

## 2022-04-27 NOTE — PROGRESS NOTES
Progress Note                               Patient: Ember Cohn MRN: [id number]  SSN: [id number]    YOB: 1990  Age: 35 y.o. Sex: female      Postpartum Day Number 2    Subjective:     Patient doing well postpartum without significant complaints. Voiding without difficulty. Patient reports normal lochia. Pain well controlled, voiding on her own without difficulty. Breast feeding. Denies HA, SOB/CP, RUQ pain, Vision changes. Objective:     Patient Vitals for the past 12 hrs:   Temp Pulse Resp BP SpO2   20 0710 97.8 °F (36.6 °C) 72 16 95/63 97 %       Temp (24hrs), Av °F (36.7 °C), Min:97.8 °F (36.6 °C), Max:98.2 °F (36.8 °C)      Physical Exam:    Constitutional: She appears well-developed and well-nourished. No distress. HENT:    Head: Normocephalic and atraumatic.    Cardiovascular: RRR  Pulmonary/Chest: CTAB  Abd:  S/NTTP/ND, BS normoactive, fundus firm at umbilicus  Ext:  No c/c/e      Lab/Data Review:  CBC:    Recent Labs     08/10/20  1301   WBC 12.1*   HGB 12.2   HCT 37.2   *     BMP/CMP:    Recent Labs     08/10/20  1301      K 3.4*      CO2 21   AGAP 10   GLU 99   BUN 8   CREA 0.62   GFRAA >60   GFRNA >60   CA 8.4   ALB 2.6*   TP 6.0*   *   GLOB 3.4   AGRAT 0.8*   ALT 12     GBS:   Lab Results   Component Value Date/Time    GrBStrep, External Negative 2018     Blood Type:   Lab Results   Component Value Date/Time    ABO/Rh(D) A POSITIVE 08/10/2020 01:01 PM    ABO,Rh A Positive 11/15/2017        Assessment and Plan:     35 y.o.  ppd# 2 s/p :    1) PP:  Meeting all pp goals, continue routine care; appropriate for DC home today  2) Breast feeding, Rh pos, Rub imm        Signed By: Lluvia Fernando MD     2020 Yes

## 2022-05-02 ENCOUNTER — HOSPITAL ENCOUNTER (OUTPATIENT)
Dept: LAB | Age: 32
Discharge: HOME | End: 2022-05-02
Payer: COMMERCIAL

## 2022-05-02 DIAGNOSIS — Z79.01 ANTICOAGULATED: ICD-10-CM

## 2022-05-02 DIAGNOSIS — Z86.2 HISTORY OF ANEMIA: ICD-10-CM

## 2022-05-02 DIAGNOSIS — Z13.0 SCREENING FOR IRON DEFICIENCY ANEMIA: ICD-10-CM

## 2022-05-02 DIAGNOSIS — O88.211 PULMONARY EMBOLISM AFFECTING PREGNANCY IN FIRST TRIMESTER: ICD-10-CM

## 2022-05-02 LAB
ALBUMIN SERPL-MCNC: 3.6 G/DL (ref 3.5–5)
ALBUMIN/GLOB SERPL: 1 {RATIO} (ref 1.2–3.5)
ALP SERPL-CCNC: 57 U/L (ref 50–136)
ALT SERPL-CCNC: 36 U/L (ref 12–65)
ANION GAP SERPL CALC-SCNC: 7 MMOL/L (ref 7–16)
AST SERPL-CCNC: 18 U/L (ref 15–37)
BASOPHILS # BLD: 0 K/UL (ref 0–0.2)
BASOPHILS NFR BLD: 0 % (ref 0–2)
BILIRUB SERPL-MCNC: 0.3 MG/DL (ref 0.2–1.1)
BUN SERPL-MCNC: 8 MG/DL (ref 6–23)
CALCIUM SERPL-MCNC: 9 MG/DL (ref 8.3–10.4)
CHLORIDE SERPL-SCNC: 103 MMOL/L (ref 98–107)
CO2 SERPL-SCNC: 25 MMOL/L (ref 21–32)
CREAT SERPL-MCNC: 0.6 MG/DL (ref 0.6–1)
DIFFERENTIAL METHOD BLD: ABNORMAL
EOSINOPHIL # BLD: 0.1 K/UL (ref 0–0.8)
EOSINOPHIL NFR BLD: 1 % (ref 0.5–7.8)
ERYTHROCYTE [DISTWIDTH] IN BLOOD BY AUTOMATED COUNT: 13.6 % (ref 11.9–14.6)
FERRITIN SERPL-MCNC: 19 NG/ML (ref 8–388)
GLOBULIN SER CALC-MCNC: 3.6 G/DL (ref 2.3–3.5)
GLUCOSE SERPL-MCNC: 74 MG/DL (ref 65–100)
HCT VFR BLD AUTO: 35.7 % (ref 35.8–46.3)
HGB BLD-MCNC: 12.2 G/DL (ref 11.7–15.4)
HGB RETIC QN AUTO: 36 PG (ref 29–35)
IMM GRANULOCYTES # BLD AUTO: 0 K/UL (ref 0–0.5)
IMM GRANULOCYTES NFR BLD AUTO: 1 % (ref 0–5)
IMM RETICS NFR: 12.2 % (ref 3–15.9)
IRON SATN MFR SERPL: 23 %
IRON SERPL-MCNC: 104 UG/DL (ref 35–150)
LYMPHOCYTES # BLD: 2.2 K/UL (ref 0.5–4.6)
LYMPHOCYTES NFR BLD: 31 % (ref 13–44)
MCH RBC QN AUTO: 31.2 PG (ref 26.1–32.9)
MCHC RBC AUTO-ENTMCNC: 34.2 G/DL (ref 31.4–35)
MCV RBC AUTO: 91.3 FL (ref 79.6–97.8)
MONOCYTES # BLD: 0.6 K/UL (ref 0.1–1.3)
MONOCYTES NFR BLD: 8 % (ref 4–12)
NEUTS SEG # BLD: 4.3 K/UL (ref 1.7–8.2)
NEUTS SEG NFR BLD: 59 % (ref 43–78)
NRBC # BLD: 0 K/UL (ref 0–0.2)
PLATELET # BLD AUTO: 220 K/UL (ref 150–450)
PMV BLD AUTO: 10.2 FL (ref 9.4–12.3)
POTASSIUM SERPL-SCNC: 3.7 MMOL/L (ref 3.5–5.1)
PROT SERPL-MCNC: 7.2 G/DL (ref 6.3–8.2)
RBC # BLD AUTO: 3.91 M/UL (ref 4.05–5.2)
RETICS # AUTO: 0.09 M/UL (ref 0.03–0.1)
RETICS/RBC NFR AUTO: 2.3 % (ref 0.3–2)
SODIUM SERPL-SCNC: 135 MMOL/L (ref 136–145)
TIBC SERPL-MCNC: 453 UG/DL (ref 250–450)
WBC # BLD AUTO: 7.2 K/UL (ref 4.3–11.1)

## 2022-05-02 PROCEDURE — 85306 CLOT INHIBIT PROT S FREE: CPT

## 2022-05-02 PROCEDURE — 85303 CLOT INHIBIT PROT C ACTIVITY: CPT

## 2022-05-02 PROCEDURE — 36415 COLL VENOUS BLD VENIPUNCTURE: CPT

## 2022-05-02 PROCEDURE — 85613 RUSSELL VIPER VENOM DILUTED: CPT

## 2022-05-02 PROCEDURE — 81240 F2 GENE: CPT

## 2022-05-02 PROCEDURE — 80053 COMPREHEN METABOLIC PANEL: CPT

## 2022-05-02 PROCEDURE — 85302 CLOT INHIBIT PROT C ANTIGEN: CPT

## 2022-05-02 PROCEDURE — 85046 RETICYTE/HGB CONCENTRATE: CPT

## 2022-05-02 PROCEDURE — 82232 ASSAY OF BETA-2 PROTEIN: CPT

## 2022-05-02 PROCEDURE — 82728 ASSAY OF FERRITIN: CPT

## 2022-05-02 PROCEDURE — 85300 ANTITHROMBIN III ACTIVITY: CPT

## 2022-05-02 PROCEDURE — 85305 CLOT INHIBIT PROT S TOTAL: CPT

## 2022-05-02 PROCEDURE — 85240 CLOT FACTOR VIII AHG 1 STAGE: CPT

## 2022-05-02 PROCEDURE — 85025 COMPLETE CBC W/AUTO DIFF WBC: CPT

## 2022-05-02 PROCEDURE — 86147 CARDIOLIPIN ANTIBODY EA IG: CPT

## 2022-05-02 PROCEDURE — 83540 ASSAY OF IRON: CPT

## 2022-05-02 PROCEDURE — 81241 F5 GENE: CPT

## 2022-05-03 LAB
AT III AG PPP IA-ACNC: 81 % (ref 72–124)
AT III PPP CHRO-ACNC: 107 % (ref 75–135)
B2 MICROGLOB SERPL-MCNC: 1.4 MG/L (ref 0.6–2.4)
CARDIOLIPIN IGA SER IA-ACNC: <9 APL U/ML (ref 0–11)
CARDIOLIPIN IGG SER IA-ACNC: 9 GPL U/ML (ref 0–14)
CARDIOLIPIN IGM SER IA-ACNC: <9 MPL U/ML (ref 0–12)
FACT VIII ACT/NOR PPP: 227 % (ref 56–140)
INTERPRETATION, 117893: NORMAL
PROT C PPP-ACNC: 167 % (ref 73–180)
PROT S ACT/NOR PPP: 48 % (ref 63–140)
PROT S AG ACT/NOR PPP IA: 60 % (ref 60–150)
PROT S FREE AG ACT/NOR PPP IA: 62 % (ref 61–136)
SCREEN APTT: 42.9 SEC (ref 0–51.9)
SCREEN DRVVT: 34.2 SEC (ref 0–47)

## 2022-05-04 LAB — PROT C AG PPP IA-ACNC: 146 % (ref 60–150)

## 2022-05-05 LAB — F2 GENE MUT ANL BLD/T: NORMAL

## 2022-05-09 LAB — F5 P.R506Q BLD/T QL: NORMAL

## 2022-05-18 VITALS — WEIGHT: 117 LBS | BODY MASS INDEX: 17.79 KG/M2 | SYSTOLIC BLOOD PRESSURE: 102 MMHG | DIASTOLIC BLOOD PRESSURE: 60 MMHG

## 2022-05-19 DIAGNOSIS — Z86.2 HISTORY OF ANEMIA: ICD-10-CM

## 2022-05-19 DIAGNOSIS — O88.211 PULMONARY EMBOLISM AFFECTING PREGNANCY IN FIRST TRIMESTER: Primary | ICD-10-CM

## 2022-05-24 ENCOUNTER — OFFICE VISIT (OUTPATIENT)
Dept: ONCOLOGY | Age: 32
End: 2022-05-24
Payer: COMMERCIAL

## 2022-05-24 VITALS
RESPIRATION RATE: 19 BRPM | HEART RATE: 102 BPM | TEMPERATURE: 97.8 F | BODY MASS INDEX: 18.29 KG/M2 | SYSTOLIC BLOOD PRESSURE: 93 MMHG | HEIGHT: 68 IN | OXYGEN SATURATION: 100 % | DIASTOLIC BLOOD PRESSURE: 52 MMHG | WEIGHT: 120.7 LBS

## 2022-05-24 DIAGNOSIS — O88.211 PULMONARY EMBOLISM AFFECTING PREGNANCY IN FIRST TRIMESTER: Primary | ICD-10-CM

## 2022-05-24 DIAGNOSIS — Z86.2 HISTORY OF ANEMIA: ICD-10-CM

## 2022-05-24 PROCEDURE — 99215 OFFICE O/P EST HI 40 MIN: CPT | Performed by: PEDIATRICS

## 2022-05-24 RX ORDER — FERROUS SULFATE 325(65) MG
650 TABLET ORAL DAILY
Qty: 180 TABLET | Refills: 1 | Status: ON HOLD | OUTPATIENT
Start: 2022-05-24 | End: 2022-10-04 | Stop reason: HOSPADM

## 2022-05-24 ASSESSMENT — PATIENT HEALTH QUESTIONNAIRE - PHQ9
SUM OF ALL RESPONSES TO PHQ QUESTIONS 1-9: 0
SUM OF ALL RESPONSES TO PHQ QUESTIONS 1-9: 0
2. FEELING DOWN, DEPRESSED OR HOPELESS: 0
SUM OF ALL RESPONSES TO PHQ QUESTIONS 1-9: 0
SUM OF ALL RESPONSES TO PHQ QUESTIONS 1-9: 0
SUM OF ALL RESPONSES TO PHQ9 QUESTIONS 1 & 2: 0
1. LITTLE INTEREST OR PLEASURE IN DOING THINGS: 0

## 2022-05-24 NOTE — PATIENT INSTRUCTIONS
Follow up for iron deficiency anemia in pregnancy (20 weeks)  Hx PE   Currently on Lovenox 50mg SQ 2x/day    Your iron studies were a little low a few weeks ago at last lab check   Start oral iron: Ferrous sulfate 325mg (take 2 pills) by mouth one time a day. Take with a vitamin C source to help with absorption. Lab only in about 12 weeks to reassess your hemoglobin and iron stores. If they are lower or your not tolerating oral iron, we can set you up for IV Iron. Follow up with OB as karena. They will manage your anticoagulation during pregnancy/delivery and after. Continue Lovenox as prescribed. Dr Mesha Leahy does recommend Lovenox for 6 weeks post partum. We will see you back about 4 months after delivery.

## 2022-05-24 NOTE — PROGRESS NOTES
Progress Notes by Shanna Serrano MD at 04/28/22 1000              Author: Shanna Serrano MD  Service: --  Author Type: Physician      Filed: 04/28/22 1134  Encounter Date: 4/28/2022  Status: Signed         : Shanna Serrano MD (Physician)               HISTORY OF PRESENT ILLNESS   Jake De is a 28 y.o.  female referred for PE in first trimester   Ms Georgette Olguin is a very pleasant 33yo woman admitted on 3/13/2022 with primary diagnosis of PE affecting pregnancy in first trimester. She is about 9-10 weeks pregnant. Currently  on Lovenox for newly diagnosed PE. She noted some worsening shortness of breath over the last few days and was sent back to the emergency room. Original CT showed single segmental PE at right lung base on 3/9/2022. She reported no chest pain, hemoptysis,  fever, abdominal pain or vaginal bleeding. She is scheduled with GYN for first ultrasound soon. She has no history of prior VTE. She is a non-smoker. She did report having cold symptoms 3 days prior to developing shortness of breath. Covid was  negative when she presented to the ED. We are consulted for pulmonary embolism in pregnancy.       1. PE in pregnancy s/p CT and no suspicion for DVT in LE    -During today's visit we discussed the need to continue subcutaneous low molecular weight heparin. She is already on Lovenox and will continue this for the duration of pregnancy and most likely  for 6 weeks postpartum. Genetic testing can be completed anytime to determine if there is a hypercoag predisposition. Molecular testing can be checked 3 months postdelivery as many of the hypercoagulable testing can be affected by pregnancy itself. This was reviewed with the patient. If she gets discharged today, we can certainly complete this in an outpatient setting and she will be scheduled for a follow-up with me upon discharge.   She is aware that she will be seeing me at the cancer center  for hematology concerns. She reported no history of VTE, and no family history of thrombosis. Prior pregnancies without issues. HPI    First time PE in early 1st trimester   On lovenox 5omg q 12 hours   Expected bruising   No bleeding   Patient Denies:   Nose bleeds   Gum bleeds   Bruising or petechia   Bleeding with surgery   Bleeding with accidents   Transfusions   History or free bleeding or hemophilia      16 weeks no complications   PNV   Denies S&S of PENELOPE: no fatigue, pica, ha, syncope, SOB, chest pain, sleep issues or restless legs   Iron deficiency with 2nd pregnancy      Personal History   No DVT   No PE   No unexplained swelling   No recurrent chest pain or SOB   No severe headache   No pregnancy loss (when applicable)      Family History   No DVT   No PE   No unexplained swelling   No recurrent chest pain or SOB   No severe headache   No pregnancy loss (when applicable)      Risk Factors   Smoking   Y   N   Oral Contraceptives  Y   N   Obesity   Y   N   Inflammation   Y   N   Steroids   Y   N          Current Outpatient Medications        Medication  Sig           cholecalciferol, vitamin D3, (VITAMIN D3 PO)  Take  by mouth. (Patient not taking: Reported on 4/7/2022)       CALCIUM PO  Take  by mouth. (Patient not taking: Reported on 5/8/7392)       FOLIC ACID PO  Take  by mouth. (Patient not taking: Reported on 4/7/2022)       enoxaparin (Lovenox) 60 mg/0.6 mL injection  50 mg by SubCUTAneous route every twelve (12) hours for 270 days.   PNV HT.51/ZIYGPFC fum/folic ac (PRENATAL PO)  Take  by mouth. No current facility-administered medications for this visit.          Past Medical History:        Diagnosis  Date           12 weeks gestation of pregnancy  3/31/2022       Anemia affecting pregnancy  6/15/2020       Chronic migraine         Infertility, female            PCOS           Nuchal translucency of fetus on prenatal ultrasound  3/31/2022       PCOS (polycystic ovarian syndrome)         Pulmonary embolus ([facility] [address]  03/14/2022          occurred on lovenox. In 1st trimester of pregnancy.   Third degree perineal laceration  2/5/2020          PURA w/ Dr. Tammy Sánchez         Past Surgical History:         Procedure  Laterality  Date            HX WISDOM TEETH EXTRACTION    02/2017        Family History         Problem  Relation  Age of Onset            Thyroid Disease  Mother        Migraines  Father        Alzheimer's Disease  Maternal Grandfather        Heart Disease  Maternal Grandfather        Lung Disease  Maternal Grandfather        Colon Cancer  Neg Hx        Ovarian Cancer  Neg Hx        Breast Cancer  Neg Hx        Diabetes  Neg Hx             Hypertension  Neg Hx          Social History         Tobacco Use           Smoking status:  Never Smoker       Smokeless tobacco:  Never Used       Substance Use Topics           Alcohol use:  No           Drug use:  Never         Immunization History        Administered  Date(s) Administered           Influenza Vaccine (Quad) Mdck Pf (>2 Yrs Flucelvax QUAD [id number])  12/26/2017       Influenza Vaccine AdsNative) PF (>6 Mo Flulaval, Fluarix, and >3 Yrs Afluria, Fluzone 17159)  11/12/2019           Tdap  05/14/2018         Allergies        Allergen  Reactions           Amoxicillin  Hives              ROS   Review of Systems    Constitutional: Negative. HENT: Negative. Eyes: Negative. Respiratory: Negative. Cardiovascular: Negative. Gastrointestinal: Negative. Genitourinary: Negative. Musculoskeletal: Negative. Skin: Negative. Neurological: Negative. Endo/Heme/Allergies: Negative. Psychiatric/Behavioral: Negative. Pain reviewed fully and addressed this visit   Med review and reconciliation addressed fully this visit   ADLs and performance level addressed, ECOG 0 unless otherwise addressed        Review of Systems   Constitutional: Negative. HENT: Negative.     Eyes: Negative. Respiratory: Negative. Cardiovascular: Negative. Gastrointestinal: Negative. Genitourinary: Negative. Musculoskeletal: Negative. Skin: Negative. Neurological: Negative. Endo/Heme/Allergies: Negative. Psychiatric/Behavioral: Negative. Pain reviewed fully and addressed this visit  Med review and reconciliation addressed fully this visit  ADLs and performance level addressed, ECOG 0 unless otherwise addressed      Vitals:    05/24/22 0936   BP: (!) 93/52   Pulse: 102   Resp: 19   Temp: 97.8 °F (36.6 °C)   SpO2: 100%           Physical Exam     Constitutional: Well developed, well nourished female in no acute distress, sitting comfortably, pregnant   HEENT: Normocephalic and atraumatic. Oropharynx is clear, mucous membranes are moist.  Pupils are equal, round, and reactive to light. Extraocular muscles are intact. Sclerae anicteric. Neck supple without JVD. No thyromegaly present. Lymph node   No palpable submandibular, cervical, supraclavicular, axillary or inguinal lymph nodes. Skin Warm and dry. No bruising and no rash noted. No erythema. No pallor. Respiratory Lungs are clear to auscultation bilaterally without wheezes, rales or rhonchi, normal air exchange without accessory muscle use. CVS Normal rate, regular rhythm and normal S1 and S2. No murmurs, gallops, or rubs. Abdomen Soft, nontender and nondistended, normoactive bowel sounds. No palpable mass. No hepatosplenomegaly. Neuro Grossly nonfocal with no obvious sensory or motor deficits. MSK Normal range of motion in general.  No edema and no tenderness. PS ECOG = 0   Psych Appropriate mood and affect. No results found for this or any previous visit (from the past 24 hour(s)).        Patient Active Problem List        Diagnosis  Code           High-risk pregnancy in first trimester  O09.91       History of anemia  Z86.2       Group beta Strep positive  B95.1       Migraine without aura with status migrainosus  G43.001       History of migraine during pregnancy  Z86.69, Z87.59       Pulmonary embolism affecting pregnancy in first trimester  O88.211           Hx of maternal laceration, 3rd degree, currently pregnant, first trimester  O09.291        Vaccination November, ? covid in January            ASSESSMENT and PLAN   First time PE precipitated by pregnancy in early trimester and no other transient risk factors or personal or family history to suggest thrombophilia but lab testing warranted   1) PE: continue lovenox 50mg q 12 hours through lat 3rd tri until 37 weeks, then transition to unfractionated heparin either 5000 or 23262 units q 12 hour and stop at induction/restart lovenox full dose for 6 weeks post partum   -bleeding risk with AC addressed   2) thrombophilia work up   -get FVL and PT gene mutation   -protein C,S, ATIII   -LA, beta2 and LIZA   -repeat DDIMER, FVIII   3) PENELOPE screening and replacement   All questions answered   Follow up 1 month and then prior to delivery   Plan is treatment through 6 weeks post, unless thrombophilia    5/24/22  Doing well  Tolerating lovenox  Denies new issues  Minor bruising and bleeding  -iron stores, low normal, start oral iron ferous sulfate 325mg 1 daily  Recheck iron stores in 12 weeks  Follow up 4 months post delivery   Total time 45 min 50% in direct consultation about the patient's diagnosis and management   Liborio Mcclendon MD   Director, Adolescent Young Adult [address] [place] [address] and Blood Disorders Program   [address] [facility]   [facility], [address] [place] [address]   AY Phone

## 2022-05-26 ENCOUNTER — ROUTINE PRENATAL (OUTPATIENT)
Dept: OBGYN CLINIC | Age: 32
End: 2022-05-26
Payer: COMMERCIAL

## 2022-05-26 VITALS — DIASTOLIC BLOOD PRESSURE: 57 MMHG | HEART RATE: 85 BPM | SYSTOLIC BLOOD PRESSURE: 87 MMHG

## 2022-05-26 DIAGNOSIS — Z86.69 HISTORY OF MIGRAINE DURING PREGNANCY: ICD-10-CM

## 2022-05-26 DIAGNOSIS — O88.212 PULMONARY EMBOLISM AFFECTING PREGNANCY IN SECOND TRIMESTER: Primary | ICD-10-CM

## 2022-05-26 DIAGNOSIS — Z87.59 HISTORY OF MIGRAINE DURING PREGNANCY: ICD-10-CM

## 2022-05-26 DIAGNOSIS — O09.92 HIGH-RISK PREGNANCY IN SECOND TRIMESTER: ICD-10-CM

## 2022-05-26 DIAGNOSIS — O09.292 HX OF MATERNAL LACERATION, 3RD DEGREE, CURRENTLY PREGNANT, SECOND TRIMESTER: ICD-10-CM

## 2022-05-26 DIAGNOSIS — Z3A.20 20 WEEKS GESTATION OF PREGNANCY: ICD-10-CM

## 2022-05-26 DIAGNOSIS — Z86.2 HISTORY OF ANEMIA: ICD-10-CM

## 2022-05-26 PROBLEM — G43.001 MIGRAINE WITHOUT AURA WITH STATUS MIGRAINOSUS: Status: ACTIVE | Noted: 2020-01-08

## 2022-05-26 PROCEDURE — 99214 OFFICE O/P EST MOD 30 MIN: CPT | Performed by: OBSTETRICS & GYNECOLOGY

## 2022-05-26 RX ORDER — ACETAMINOPHEN 500 MG
1000 TABLET ORAL EVERY 6 HOURS PRN
COMMUNITY

## 2022-05-26 ASSESSMENT — PATIENT HEALTH QUESTIONNAIRE - PHQ9
SUM OF ALL RESPONSES TO PHQ QUESTIONS 1-9: 0
SUM OF ALL RESPONSES TO PHQ9 QUESTIONS 1 & 2: 0
2. FEELING DOWN, DEPRESSED OR HOPELESS: 0
SUM OF ALL RESPONSES TO PHQ QUESTIONS 1-9: 0
1. LITTLE INTEREST OR PLEASURE IN DOING THINGS: 0

## 2022-05-26 NOTE — PROGRESS NOTES
M Consultation    Coral Washington (: 1990) is a 28 y.o. [id number] at [address] with 10/10/2022, by Last Menstrual Period. 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 Scheduled to see Primary OB [facility]) on 22, and Dr Meaghan Barr with Hematology on 22     No HAs, edema. Denies preeclamptic symptoms. Reports fetal movement. No bleeding, LOF, cramping, ctxs, or vaginal pressure. Dx with PE late first trimester. Had had URI prior, never tested positive for COVID. No personal hx of clots in other pregnancies. Inherited thrombophilia workup negative; protein S/C will be rechecked pp. With hx anemia on anticoagulation, will see H/O again at 32wk     Interval history reviewed. Review of Systems - per HPI; otherwise unremarkable. Exam:     Vitals:    22 0947   BP: (!) 87/57   Pulse: 85       Ultrasound: Please see formal ultrasound report under imaging tab. ASSESSMENT/PLAN:  Patient Active Problem List    Diagnosis Date Noted    Pulmonary embolism affecting pregnancy in second trimester 2022     Priority: High     3/31/2022 at [facility]: Patient reports diagnosis of PE on 3/9/22. States she was having SOB on a  that progressively got worse over a few days. She went to the ER on Wednesday because she couldn't take a deep breath   and felt dizzy and lightheaded. They sent her to the ER for CT scan. Taking Lovenox 50 mg BID. Reports SOB today when walking up and down stairs, or more active. Pulse Ox range today %. Unclear if pt had COVID, did not test prior to PE.   22 at [facility]: Takes Lovenox 50mg BID  · Thrombosis warnings given.    · Followed by Hematology-next scheduled 22  · Stop Lovenox at 37 weeks; switch to Unfractionated Heparin 10,000 units every 12 hours SQ.  · If induction  Admit for induction  Stop heparin (no regional anesthetic for 6-12hr after last dose)  Place on pneumatic compression hose   If labors occurs, stop heparin and place on pneumatic compression hose as stated above. This will allow her to have epidural placement. Continue pneumatic compression hose through delivery and for 12 hours post partum. Restart Lovenox at 40 mg SQ once daily 24 hours after C/S delivery or 12 hours after . Continue Lovenox for 6 weeks after delivery. I would not recommend Coumadin after delivery due to the risk of bleeding complications and the requirement of frequent blood level testing on Coumadin. Other oral anticoagulants are not used in pregnancy or lactation.  High-risk pregnancy in second trimester 2020     Priority: High     3/31/2022 at [facility]: Normal NT 1.2mm and nasal bone. Declines genetic testing. Genetic counseling done by MD.  22 at [facility]: Normal anatomy and echo; AC 86 %, GÓMEZ WNL, Dopplers WNL        Migraine without aura with status migrainosus 2020     Priority: Medium     Chronic issue, worsening during 1st trimester of pregnancy. Continue   tylenol for headache treatment in pregnancy. Stay hydrated, consider   chiropractor. Discussed treating with tylenol + reglan or tylenol +   codeine if headaches worsen. Can fill out [facility] paperwork for pt if she is   eligible through work.  History of migraine during pregnancy 2016     Priority: Medium     3/31/2022 at [facility]: Denies any current headaches. Can take Tylenol prn Migraines are controlled well with imitrex. Pt is happy and wishes to continue use. Recheck in 12 months, sooner if needed.  Hx of maternal laceration, 3rd degree, currently pregnant, second trimester 2022     Priority: Low    History of anemia 06/15/2020     Priority: Low     3/31/2022 at [facility]: Hx of anemia in pregnancy in .  Current CBC on 3/14/22 HGB 13.4        Group beta Strep positive 08/10/2020     Doing well from PE perspective. Reviewed transition to heparin at term; delivered 39w2, 39w5 in past, spontaneous labor. Attempt to IOL prior to spontaneous labor at 39wk to allow for holding of heparin. Doing ok with lovenox     Reassuring fetal status. Addressed normal pregnancy complaints, reassured and offered suggestions for care  Reviewed gestational age precautions and activity goals/limitations  Nutritional counseling as well as specific goals based on current maternal and fetal status  Options for GERD therapy if becomes symptomatic over course of pregnancy  Gestational age appropriate preventive care regarding communicable disease transmission and vaccines as appropriate (including flu, TDaP, and COVID.)  Additional plans and concerns as documented in problem list.   All questions answered and concerns discussed. On this date 5/26/2022, I have spent 32 minutes reviewing previous notes, test results and face to face with the patient discussing the diagnosis and importance of compliance with the treatment plan, in addition to ultrasound findings, as well as documenting on the day of the visit       An electronic signature was used to authenticate this note. Elisa Elmore MD      1. Pulmonary embolism affecting pregnancy in second trimester    2. High-risk pregnancy in second trimester    3. History of anemia    4. History of migraine during pregnancy    5. 20 weeks gestation of pregnancy    6.  Hx of maternal laceration, 3rd degree, currently pregnant, second trimester

## 2022-06-06 ENCOUNTER — ROUTINE PRENATAL (OUTPATIENT)
Dept: OBGYN CLINIC | Age: 32
End: 2022-06-06

## 2022-06-06 VITALS — SYSTOLIC BLOOD PRESSURE: 100 MMHG | BODY MASS INDEX: 18.7 KG/M2 | DIASTOLIC BLOOD PRESSURE: 60 MMHG | WEIGHT: 123 LBS

## 2022-06-06 DIAGNOSIS — O88.212 PULMONARY EMBOLISM AFFECTING PREGNANCY IN SECOND TRIMESTER: ICD-10-CM

## 2022-06-06 DIAGNOSIS — O09.92 HIGH-RISK PREGNANCY IN SECOND TRIMESTER: ICD-10-CM

## 2022-06-06 DIAGNOSIS — O09.292 HX OF MATERNAL LACERATION, 3RD DEGREE, CURRENTLY PREGNANT, SECOND TRIMESTER: ICD-10-CM

## 2022-06-06 PROCEDURE — 99902 PR PRENATAL VISIT: CPT | Performed by: OBSTETRICS & GYNECOLOGY

## 2022-06-06 NOTE — PROGRESS NOTES
28yo [id number] at 22w0d for VITALIY:  Hx of PE in 1st trimester, now on lovenox. +FM, no VB, no LOF. Routine OB counseling reviewed. GTT next appt. Problem List  Reviewed: 5/26/2022 11:34 AM by Sharla Mcgee RN          ICD-10-CM Priority Class Noted - [address] To Last Modified    History of migraine during pregnancy Z86.69, Z87.59   9/20/2016 - Present   5/26/2022 by Calvin Joseph MD     Entered by Luis M Downs    Overview Addendum 5/26/2022 11:32 AM by Sharla Mcgee RN     3/31/2022 at [facility]: Denies any current headaches. Can take Tylenol prn Migraines are controlled well with imitrex. Pt is happy and wishes to continue use. Recheck in 12 months, sooner if needed. Migraine without aura with status migrainosus G43.001   1/8/2020 - Present   5/26/2022 by Calvin Joseph MD     Entered by Millie Downs    Overview Signed 4/1/2022  8:40 AM by Luis M Downs     Chronic issue, worsening during 1st trimester of pregnancy. Continue   tylenol for headache treatment in pregnancy. Stay hydrated, consider   chiropractor. Discussed treating with tylenol + reglan or tylenol +   codeine if headaches worsen. Can fill out FMLA paperwork for pt if she is   eligible through work. High-risk pregnancy in second trimester O09.92   2/5/2020 - Present   5/26/2022 by Calvin Joseph MD     Entered by Luis M Downs    Overview Addendum 5/26/2022  9:58 AM by Sharla Mcgee RN     3/31/2022 at [facility]: Normal NT 1.2mm and nasal bone. Declines genetic testing. Genetic counseling done by MD.  05/26/22 at [facility]: Normal anatomy and echo; AC 86 %, GÓMEZ WNL, Dopplers WNL           History of anemia Z86.2   6/15/2020 - Present   5/26/2022 by Calvin Joseph MD     Entered by Luis M Downs    Overview Addendum 5/26/2022 11:32 AM by Sharla Mcgee RN     3/31/2022 at [facility]: Hx of anemia in pregnancy in 2020.  Current CBC on 3/14/22 HGB 13.4           Group beta Strep positive B95.1   8/10/2020 - Present 8/10/2020 by Tushar Mendieta     Entered by Annalee Serna    Hx of maternal laceration, 3rd degree, currently pregnant, second trimester O09.292   3/29/2022 - Present   2022 by Samuel Friedman MD     Entered by Luis M Downs    Overview Deleted 3/31/2022  2:01 PM by Luis M Downs              Pulmonary embolism affecting pregnancy in second trimester O88.212   3/29/2022 - Present   2022 by Samuel Friedman MD     Entered by Luis M Downs    Overview Addendum 2022 11:35 AM by Poonam Neil RN     3/31/2022 at [facility]: Patient reports diagnosis of PE on 3/9/22. States she was having SOB on a  that progressively got worse over a few days. She went to the ER on Wednesday because she couldn't take a deep breath   and felt dizzy and lightheaded. They sent her to the ER for CT scan. Taking Lovenox 50 mg BID. Reports SOB today when walking up and down stairs, or more active. Pulse Ox range today %. Unclear if pt had COVID, did not test prior to PE.   22 at [facility]: Takes Lovenox 50mg BID  · Thrombosis warnings given. · Followed by Hematology-next scheduled 22  · Stop Lovenox at 37 weeks; switch to Unfractionated Heparin 10,000 units every 12 hours SQ.  · If induction  Admit for induction  Stop heparin (no regional anesthetic for 6-12hr after last dose)  Place on pneumatic compression hose   If labors occurs, stop heparin and place on pneumatic compression hose as stated above. This will allow her to have epidural placement. Continue pneumatic compression hose through delivery and for 12 hours post partum. Restart Lovenox at 40 mg SQ once daily 24 hours after C/S delivery or 12 hours after . Continue Lovenox for 6 weeks after delivery. I would not recommend Coumadin after delivery due to the risk of bleeding complications and the requirement of frequent blood level testing on Coumadin. Other oral anticoagulants are not used in pregnancy or lactation. RESOLVED: Third degree perineal laceration O70.20   2/5/2020 - 3/30/2022   3/30/2022 by Himanshu, Convprob     Entered by Himanshu, Convprob1     Resolved by  Himanshu, Convprob    Overview Signed 3/30/2022  1:30 PM by Himanshu, Convprob     PURA w/ Dr. Jenna Gomez: 10 weeks gestation of pregnancy Z3A.10   8/10/2020 - 3/30/2022   3/30/2022 by Himanshu, Convprob     Entered by Taryn Chairez MD     Resolved by  Himanshu, Convprob    RESOLVED: Normal labor O80, Z37.9   8/10/2020 - 3/30/2022   3/30/2022 by Himanshu, Convprob     Entered by [facility]     Resolved by  Himanshu, Convprob    RESOLVED: Blood clot embolism during pregnancy, antepartum O88.219   3/13/2022 - 3/30/2022   3/30/2022 by Himanshu, Convprob     Entered by Taryn Chairez MD     Resolved by  Himanshu, Convprob    RESOLVED: Nuchal translucency of fetus on prenatal ultrasound Z36.82   3/31/2022 - 3/31/2022   3/31/2022 by Himanshu, Convprob     Entered and resolved by Himanshu, Convprob    RESOLVED: 12 weeks gestation of pregnancy Z3A.12   3/31/2022 - 3/31/2022   3/31/2022 by Himanshu, Convprob     Entered and resolved by Himanshu, Convprob

## 2022-07-07 ENCOUNTER — ROUTINE PRENATAL (OUTPATIENT)
Dept: OBGYN CLINIC | Age: 32
End: 2022-07-07

## 2022-07-07 VITALS — WEIGHT: 127 LBS | BODY MASS INDEX: 19.31 KG/M2 | DIASTOLIC BLOOD PRESSURE: 60 MMHG | SYSTOLIC BLOOD PRESSURE: 102 MMHG

## 2022-07-07 DIAGNOSIS — O09.292 HX OF MATERNAL LACERATION, 3RD DEGREE, CURRENTLY PREGNANT, SECOND TRIMESTER: ICD-10-CM

## 2022-07-07 DIAGNOSIS — O09.92 HIGH-RISK PREGNANCY IN SECOND TRIMESTER: Primary | ICD-10-CM

## 2022-07-07 DIAGNOSIS — O88.212 PULMONARY EMBOLISM AFFECTING PREGNANCY IN SECOND TRIMESTER: ICD-10-CM

## 2022-07-07 PROCEDURE — 99902 PR PRENATAL VISIT: CPT | Performed by: OBSTETRICS & GYNECOLOGY

## 2022-07-07 NOTE — PROGRESS NOTES
28yo [id number] @ 26w3d for VITALIY:    Denies complaints other than routine OB.    +FM, no VB, no ctx, no LOF. Vitals:    22 1148   BP: 102/60     +++FHTs    Approp FH. Patient Active Problem List    Diagnosis Date Noted    Hx of maternal laceration, 3rd degree, currently pregnant, second trimester 2022    Pulmonary embolism affecting pregnancy in second trimester 2022     3/31/2022 at [facility]: Patient reports diagnosis of PE on 3/9/22. States she was having SOB on a  that progressively got worse over a few days. She went to the ER on Wednesday because she couldn't take a deep breath   and felt dizzy and lightheaded. They sent her to the ER for CT scan. Taking Lovenox 50 mg BID. Reports SOB today when walking up and down stairs, or more active. Pulse Ox range today %. Unclear if pt had COVID, did not test prior to PE.   22 at [facility]: Takes Lovenox 50mg BID  Thrombosis warnings given. Followed by Hematology-next scheduled 22  Stop Lovenox at 37 weeks; switch to Unfractionated Heparin 10,000 units every 12 hours SQ. If induction  Admit for induction  Stop heparin (no regional anesthetic for 6-12hr after last dose)  Place on pneumatic compression hose   If labors occurs, stop heparin and place on pneumatic compression hose as stated above. This will allow her to have epidural placement. Continue pneumatic compression hose through delivery and for 12 hours post partum. Restart Lovenox at 40 mg SQ once daily 24 hours after C/S delivery or 12 hours after . Continue Lovenox for 6 weeks after delivery. I would not recommend Coumadin after delivery due to the risk of bleeding complications and the requirement of frequent blood level testing on Coumadin. Other oral anticoagulants are not used in pregnancy or lactation. Group beta Strep positive 08/10/2020    History of anemia 06/15/2020     3/31/2022 at [facility]: Hx of anemia in pregnancy in .  Current CBC on 3/14/22 HGB 13.4        High-risk pregnancy in second trimester 02/05/2020     3/31/2022 at [facility]: Normal NT 1.2mm and nasal bone. Declines genetic testing. Genetic counseling done by MD.  05/26/22 at [facility]: Normal anatomy and echo; AC 86 %, GÓMEZ WNL, Dopplers WNL        Migraine without aura with status migrainosus 01/08/2020     Chronic issue, worsening during 1st trimester of pregnancy. Continue   tylenol for headache treatment in pregnancy. Stay hydrated, consider   chiropractor. Discussed treating with tylenol + reglan or tylenol +   codeine if headaches worsen. Can fill out FMLA paperwork for pt if she is   eligible through work. History of migraine during pregnancy 09/20/2016     3/31/2022 at [facility]: Denies any current headaches. Can take Tylenol prn Migraines are controlled well with imitrex. Pt is happy and wishes to continue use. Recheck in 12 months, sooner if needed.          RTC 2wks for VITALIY & 1hrGTT

## 2022-07-20 PROBLEM — O88.213 PULMONARY EMBOLISM AFFECTING PREGNANCY IN THIRD TRIMESTER: Status: ACTIVE | Noted: 2022-03-29

## 2022-07-20 PROBLEM — O09.93 HIGH-RISK PREGNANCY IN THIRD TRIMESTER: Status: ACTIVE | Noted: 2020-02-05

## 2022-07-20 PROBLEM — O09.293: Status: ACTIVE | Noted: 2022-03-29

## 2022-07-21 ENCOUNTER — ROUTINE PRENATAL (OUTPATIENT)
Dept: OBGYN CLINIC | Age: 32
End: 2022-07-21
Payer: COMMERCIAL

## 2022-07-21 VITALS — DIASTOLIC BLOOD PRESSURE: 61 MMHG | SYSTOLIC BLOOD PRESSURE: 90 MMHG

## 2022-07-21 DIAGNOSIS — O88.213 PULMONARY EMBOLISM AFFECTING PREGNANCY IN THIRD TRIMESTER: Primary | ICD-10-CM

## 2022-07-21 DIAGNOSIS — O09.293: ICD-10-CM

## 2022-07-21 DIAGNOSIS — O09.93 HIGH-RISK PREGNANCY IN THIRD TRIMESTER: ICD-10-CM

## 2022-07-21 DIAGNOSIS — Z86.2 HISTORY OF ANEMIA: ICD-10-CM

## 2022-07-21 DIAGNOSIS — Z3A.28 28 WEEKS GESTATION OF PREGNANCY: ICD-10-CM

## 2022-07-21 DIAGNOSIS — Z87.59 HISTORY OF MIGRAINE DURING PREGNANCY: ICD-10-CM

## 2022-07-21 DIAGNOSIS — Z86.69 HISTORY OF MIGRAINE DURING PREGNANCY: ICD-10-CM

## 2022-07-21 PROCEDURE — 99213 OFFICE O/P EST LOW 20 MIN: CPT | Performed by: OBSTETRICS & GYNECOLOGY

## 2022-07-21 PROCEDURE — 76819 FETAL BIOPHYS PROFIL W/O NST: CPT | Performed by: OBSTETRICS & GYNECOLOGY

## 2022-07-21 PROCEDURE — 76816 OB US FOLLOW-UP PER FETUS: CPT | Performed by: OBSTETRICS & GYNECOLOGY

## 2022-07-21 ASSESSMENT — PATIENT HEALTH QUESTIONNAIRE - PHQ9
2. FEELING DOWN, DEPRESSED OR HOPELESS: 0
SUM OF ALL RESPONSES TO PHQ QUESTIONS 1-9: 0
SUM OF ALL RESPONSES TO PHQ QUESTIONS 1-9: 0
SUM OF ALL RESPONSES TO PHQ9 QUESTIONS 1 & 2: 0
SUM OF ALL RESPONSES TO PHQ QUESTIONS 1-9: 0
1. LITTLE INTEREST OR PLEASURE IN DOING THINGS: 0
SUM OF ALL RESPONSES TO PHQ QUESTIONS 1-9: 0

## 2022-07-21 NOTE — PROGRESS NOTES
M Consultation    Rio Aguero (: 1990) is a 33 y.o. [place] at 29w2d with 10/10/2022, by Last Menstrual Period. 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 Scheduled to see Primary OB [facility]) on 22, and Dr Kelsea Benavidez with Hematology on 22     No HAs, edema. Denies preeclamptic symptoms. Reports fetal movement. No bleeding, LOF, cramping, ctxs, or vaginal pressure. Dx with PE late first trimester. Had had URI prior, never tested positive for COVID. No personal hx of clots in other pregnancies. Inherited thrombophilia workup negative; protein S/C will be rechecked pp. With hx anemia on anticoagulation, will see H/O again at [address]. Doing well with few concerns today. Plans lactation if possible. Interval history since prior appt reviewed and updated as indicated. Review of Systems - per HPI; otherwise unremarkable. Exam:     Vitals:    22 1557   BP: 90/61     Ultrasound: Please see formal ultrasound report under imaging tab. ASSESSMENT/PLAN:  Patient Active Problem List    Diagnosis Date Noted    Pulmonary embolism affecting pregnancy in third trimester 2022     Priority: High     3/31/2022 at [facility]: Patient reports diagnosis of PE on 3/9/22. States she was having SOB on a  that progressively got worse over a few days. She went to the ER on Wednesday because she couldn't take a deep breath and felt dizzy and lightheaded. They sent her to the ER for CT scan. Taking Lovenox 50 mg BID. Reports SOB today when walking up and down stairs, or more active. Pulse Ox range today %. Unclear if pt had COVID, did not test prior to PE.   22 at [facility]: Takes Lovenox 50mg BID  22 at [facility]: Takes Lovenox 50mg BID    Thrombosis warnings given.    Followed by Hematology-next scheduled 22  Stop Lovenox at 37-38 weeks; switch to Unfractionated Heparin 10,000 units every 12 hours SQ. On admission for IOL:   Stop heparin (no regional anesthetic for 6-12hr after last dose)  Place on pneumatic compression hose   If labors occurs, stop heparin and place on pneumatic compression hose as stated above. This will allow her to have epidural placement. Continue pneumatic compression hose through delivery and for 12 hours post partum. Restart Lovenox at 40 mg SQ once daily 24 hours after C/S delivery or 12 hours after . Continue Lovenox for 6 weeks after delivery. I would not recommend Coumadin after delivery due to the risk of bleeding complications and the requirement of frequent blood level testing on Coumadin. Other oral anticoagulants are not recommended for use in pregnancy or lactation. High-risk pregnancy in third trimester 2020     Priority: High     3/31/2022 at [facility]: Normal NT 1.2mm and nasal bone. Declines genetic testing. Genetic counseling done by MD.  22 at [facility]: Normal anatomy and echo; AC 86 %, GÓMEZ WNL, Dopplers WNL  22 at [facility]: Appropriate fetal growth; Normal repeat echo            Migraine without aura with status migrainosus 2020     Priority: Medium     Chronic issue, worsening during 1st trimester of pregnancy. Continue tylenol for headache treatment in pregnancy. Stay hydrated, consider chiropractor. Discussed treating with tylenol + reglan or tylenol + codeine if headaches worsen. Can fill out [facility] paperwork for pt if she is eligible through work. History of migraine during pregnancy 2016     Priority: Medium     3/31/2022 at [facility]: Denies any current headaches. Can take Tylenol prn Migraines are controlled well with imitrex. Pt is happy and wishes to continue use. Recheck in 12 months, sooner if needed. 22 at [facility]: Reports HA this past week. Has been taking tylenol with relief.           History of maternal cervical laceration, currently pregnant, third trimester 03/29/2022     Priority: Low    History of anemia 06/15/2020     Priority: Low     3/31/2022 at [facility]: Hx of anemia in pregnancy in 2020. Current CBC on 3/14/22 HGB 13.4        Group beta Strep positive 08/10/2020     Defer lovenox dosing to H/O  Concerns addressed. As pt planning to breastfeed, will need lovenox pp. Addressed normal pregnancy complaints, reassured and offered suggestions for care  Reviewed gestational age precautions and activity goals/limitations  Nutritional counseling as well as specific goals based on current maternal and fetal status  Options for GERD therapy if becomes symptomatic over course of pregnancy  Gestational age appropriate preventive care regarding communicable disease transmission and vaccines as appropriate (including flu, TDaP, and COVID.)  Additional plans and concerns as documented in problem list.   All questions answered and concerns discussed. An electronic signature was used to authenticate this note. Lokesh Qureshi MD    I have spent 25 minutes reviewing previous notes, test results and face to face with the patient discussing the diagnosis and importance of compliance with the treatment plan, in addition to ultrasound findings, as well as documenting on the day of the visit (07/21/2022).     Return as needed for concerns      Patient Active Problem List   Diagnosis Code    Migraine without aura with status migrainosus G43.001    Group beta Strep positive B95.1    History of maternal cervical laceration, currently pregnant, third trimester O09.293    High-risk pregnancy in third trimester O09.93    History of migraine during pregnancy Z86.69, Z87.59    History of anemia Z86.2    Pulmonary embolism affecting pregnancy in third trimester O88.213

## 2022-07-28 ENCOUNTER — ROUTINE PRENATAL (OUTPATIENT)
Dept: OBGYN CLINIC | Age: 32
End: 2022-07-28

## 2022-07-28 VITALS — BODY MASS INDEX: 19.61 KG/M2 | SYSTOLIC BLOOD PRESSURE: 102 MMHG | DIASTOLIC BLOOD PRESSURE: 60 MMHG | WEIGHT: 129 LBS

## 2022-07-28 DIAGNOSIS — O09.93 HIGH-RISK PREGNANCY IN THIRD TRIMESTER: Primary | ICD-10-CM

## 2022-07-28 DIAGNOSIS — Z13.0 SCREENING, ANEMIA, DEFICIENCY, IRON: ICD-10-CM

## 2022-07-28 DIAGNOSIS — O88.213 PULMONARY EMBOLISM AFFECTING PREGNANCY IN THIRD TRIMESTER: ICD-10-CM

## 2022-07-28 DIAGNOSIS — Z13.1 SCREENING FOR DIABETES MELLITUS: ICD-10-CM

## 2022-07-28 DIAGNOSIS — O09.293: ICD-10-CM

## 2022-07-28 LAB
BASOPHILS # BLD: 0 K/UL (ref 0–0.2)
BASOPHILS NFR BLD: 1 % (ref 0–2)
DIFFERENTIAL METHOD BLD: ABNORMAL
EOSINOPHIL # BLD: 0.1 K/UL (ref 0–0.8)
EOSINOPHIL NFR BLD: 1 % (ref 0.5–7.8)
ERYTHROCYTE [DISTWIDTH] IN BLOOD BY AUTOMATED COUNT: 13.9 % (ref 11.9–14.6)
HCT VFR BLD AUTO: 35 % (ref 35.8–46.3)
HGB BLD-MCNC: 11.1 G/DL (ref 11.7–15.4)
IMM GRANULOCYTES # BLD AUTO: 0.2 K/UL (ref 0–0.5)
IMM GRANULOCYTES NFR BLD AUTO: 2 % (ref 0–5)
LYMPHOCYTES # BLD: 2.2 K/UL (ref 0.5–4.6)
LYMPHOCYTES NFR BLD: 26 % (ref 13–44)
MCH RBC QN AUTO: 30.3 PG (ref 26.1–32.9)
MCHC RBC AUTO-ENTMCNC: 31.7 G/DL (ref 31.4–35)
MCV RBC AUTO: 95.6 FL (ref 79.6–97.8)
MONOCYTES # BLD: 0.7 K/UL (ref 0.1–1.3)
MONOCYTES NFR BLD: 8 % (ref 4–12)
NEUTS SEG # BLD: 5.2 K/UL (ref 1.7–8.2)
NEUTS SEG NFR BLD: 62 % (ref 43–78)
NRBC # BLD: 0 K/UL (ref 0–0.2)
PLATELET # BLD AUTO: 177 K/UL (ref 150–450)
PMV BLD AUTO: 11.4 FL (ref 9.4–12.3)
RBC # BLD AUTO: 3.66 M/UL (ref 4.05–5.2)
WBC # BLD AUTO: 8.4 K/UL (ref 4.3–11.1)

## 2022-07-28 PROCEDURE — 99902 PR PRENATAL VISIT: CPT | Performed by: OBSTETRICS & GYNECOLOGY

## 2022-07-28 NOTE — PROGRESS NOTES
28yo [id number] at 29w3d for VITALIY:    +FM, no VB, LOF or CTX  No SOB or other sx rel to DVT/PE    On lovenox and planning to transition to heparin at 36-38wks. Vitals:    07/28/22 0903   BP: 102/60     +++FHTs  FH 28    1. High-risk pregnancy in third trimester    2. Pulmonary embolism affecting pregnancy in third trimester    3. History of maternal cervical laceration, currently pregnant, third trimester    4. Screening for diabetes mellitus    5. Screening, anemia, deficiency, iron      Orders Placed This Encounter   Procedures    Glucose tolerance, 1 hour only, single test    CBC with Auto Differential     Pt aware of plan w/ anticoagulation pre and post delivery. (Continue lovenox for 6 wks after delivery)  Hx of 3rd degree lac - has been counseled and desires vaginal delivery. Scheduled w/ hemonc for IV iron. RTC 2wks for VITALIY.

## 2022-07-29 LAB — GLUCOSE 1 HOUR: 85 MG/DL

## 2022-08-08 ENCOUNTER — ROUTINE PRENATAL (OUTPATIENT)
Dept: OBGYN CLINIC | Age: 32
End: 2022-08-08

## 2022-08-08 ENCOUNTER — HOSPITAL ENCOUNTER (OUTPATIENT)
Dept: LAB | Age: 32
Discharge: HOME | End: 2022-08-11
Payer: COMMERCIAL

## 2022-08-08 VITALS — BODY MASS INDEX: 19.92 KG/M2 | SYSTOLIC BLOOD PRESSURE: 98 MMHG | WEIGHT: 131 LBS | DIASTOLIC BLOOD PRESSURE: 60 MMHG

## 2022-08-08 DIAGNOSIS — O09.293: ICD-10-CM

## 2022-08-08 DIAGNOSIS — O09.93 HIGH-RISK PREGNANCY IN THIRD TRIMESTER: Primary | ICD-10-CM

## 2022-08-08 DIAGNOSIS — O88.213 PULMONARY EMBOLISM AFFECTING PREGNANCY IN THIRD TRIMESTER: ICD-10-CM

## 2022-08-08 DIAGNOSIS — O88.211 PULMONARY EMBOLISM AFFECTING PREGNANCY IN FIRST TRIMESTER: ICD-10-CM

## 2022-08-08 DIAGNOSIS — Z86.2 HISTORY OF ANEMIA: ICD-10-CM

## 2022-08-08 LAB
ALBUMIN SERPL-MCNC: 2.8 G/DL (ref 3.5–5)
ALBUMIN/GLOB SERPL: 0.8 {RATIO} (ref 1.2–3.5)
ALP SERPL-CCNC: 77 U/L (ref 50–136)
ALT SERPL-CCNC: 17 U/L (ref 12–65)
ANION GAP SERPL CALC-SCNC: 8 MMOL/L (ref 7–16)
AST SERPL-CCNC: 13 U/L (ref 15–37)
BASOPHILS # BLD: 0 K/UL (ref 0–0.2)
BASOPHILS NFR BLD: 0 % (ref 0–2)
BILIRUB SERPL-MCNC: 0.3 MG/DL (ref 0.2–1.1)
BUN SERPL-MCNC: 11 MG/DL (ref 6–23)
CALCIUM SERPL-MCNC: 9.2 MG/DL (ref 8.3–10.4)
CHLORIDE SERPL-SCNC: 104 MMOL/L (ref 98–107)
CO2 SERPL-SCNC: 25 MMOL/L (ref 21–32)
CREAT SERPL-MCNC: 0.5 MG/DL (ref 0.6–1)
DIFFERENTIAL METHOD BLD: ABNORMAL
EOSINOPHIL # BLD: 0.1 K/UL (ref 0–0.8)
EOSINOPHIL NFR BLD: 1 % (ref 0.5–7.8)
ERYTHROCYTE [DISTWIDTH] IN BLOOD BY AUTOMATED COUNT: 13.9 % (ref 11.9–14.6)
FERRITIN SERPL-MCNC: 8 NG/ML (ref 8–388)
GLOBULIN SER CALC-MCNC: 3.6 G/DL (ref 2.3–3.5)
GLUCOSE SERPL-MCNC: 77 MG/DL (ref 65–100)
HCT VFR BLD AUTO: 35.2 % (ref 35.8–46.3)
HGB BLD-MCNC: 11.4 G/DL (ref 11.7–15.4)
HGB RETIC QN AUTO: 31 PG (ref 29–35)
IMM GRANULOCYTES # BLD AUTO: 0.2 K/UL (ref 0–0.5)
IMM GRANULOCYTES NFR BLD AUTO: 2 % (ref 0–5)
IMM RETICS NFR: 17.1 % (ref 3–15.9)
IRON SATN MFR SERPL: 24 %
IRON SERPL-MCNC: 132 UG/DL (ref 35–150)
LYMPHOCYTES # BLD: 2.1 K/UL (ref 0.5–4.6)
LYMPHOCYTES NFR BLD: 24 % (ref 13–44)
MCH RBC QN AUTO: 29.9 PG (ref 26.1–32.9)
MCHC RBC AUTO-ENTMCNC: 32.4 G/DL (ref 31.4–35)
MCV RBC AUTO: 92.4 FL (ref 79.6–97.8)
MONOCYTES # BLD: 0.8 K/UL (ref 0.1–1.3)
MONOCYTES NFR BLD: 9 % (ref 4–12)
NEUTS SEG # BLD: 5.8 K/UL (ref 1.7–8.2)
NEUTS SEG NFR BLD: 64 % (ref 43–78)
NRBC # BLD: 0 K/UL (ref 0–0.2)
PLATELET # BLD AUTO: 162 K/UL (ref 150–450)
PMV BLD AUTO: 10.4 FL (ref 9.4–12.3)
POTASSIUM SERPL-SCNC: 3.7 MMOL/L (ref 3.5–5.1)
PROT SERPL-MCNC: 6.4 G/DL (ref 6.3–8.2)
RBC # BLD AUTO: 3.81 M/UL (ref 4.05–5.2)
RETICS # AUTO: 0.1 M/UL (ref 0.03–0.1)
RETICS/RBC NFR AUTO: 2.7 % (ref 0.3–2)
SODIUM SERPL-SCNC: 137 MMOL/L (ref 136–145)
TIBC SERPL-MCNC: 550 UG/DL (ref 250–450)
WBC # BLD AUTO: 9 K/UL (ref 4.3–11.1)

## 2022-08-08 PROCEDURE — 85025 COMPLETE CBC W/AUTO DIFF WBC: CPT

## 2022-08-08 PROCEDURE — 36415 COLL VENOUS BLD VENIPUNCTURE: CPT

## 2022-08-08 PROCEDURE — 83540 ASSAY OF IRON: CPT

## 2022-08-08 PROCEDURE — 99902 PR PRENATAL VISIT: CPT | Performed by: OBSTETRICS & GYNECOLOGY

## 2022-08-08 PROCEDURE — 85046 RETICYTE/HGB CONCENTRATE: CPT

## 2022-08-08 PROCEDURE — 80053 COMPREHEN METABOLIC PANEL: CPT

## 2022-08-08 PROCEDURE — 82728 ASSAY OF FERRITIN: CPT

## 2022-08-17 DIAGNOSIS — O99.019 IRON DEFICIENCY ANEMIA DURING PREGNANCY: Primary | ICD-10-CM

## 2022-08-17 DIAGNOSIS — D50.9 IRON DEFICIENCY ANEMIA DURING PREGNANCY: Primary | ICD-10-CM

## 2022-08-17 RX ORDER — HEPARIN SODIUM (PORCINE) LOCK FLUSH IV SOLN 100 UNIT/ML 100 UNIT/ML
500 SOLUTION INTRAVENOUS PRN
OUTPATIENT
Start: 2022-08-26

## 2022-08-17 RX ORDER — SODIUM CHLORIDE 9 MG/ML
5-250 INJECTION, SOLUTION INTRAVENOUS PRN
OUTPATIENT
Start: 2022-08-26

## 2022-08-17 RX ORDER — SODIUM CHLORIDE 9 MG/ML
INJECTION, SOLUTION INTRAVENOUS CONTINUOUS
OUTPATIENT
Start: 2022-08-26

## 2022-08-17 RX ORDER — DIPHENHYDRAMINE HYDROCHLORIDE 50 MG/ML
50 INJECTION INTRAMUSCULAR; INTRAVENOUS
OUTPATIENT
Start: 2022-08-26

## 2022-08-17 RX ORDER — ONDANSETRON 2 MG/ML
8 INJECTION INTRAMUSCULAR; INTRAVENOUS
OUTPATIENT
Start: 2022-08-26

## 2022-08-17 RX ORDER — ALBUTEROL SULFATE 90 UG/1
4 AEROSOL, METERED RESPIRATORY (INHALATION) PRN
OUTPATIENT
Start: 2022-08-26

## 2022-08-17 RX ORDER — FAMOTIDINE 10 MG/ML
20 INJECTION, SOLUTION INTRAVENOUS
OUTPATIENT
Start: 2022-08-26

## 2022-08-17 RX ORDER — SODIUM CHLORIDE 0.9 % (FLUSH) 0.9 %
5-40 SYRINGE (ML) INJECTION PRN
OUTPATIENT
Start: 2022-08-26

## 2022-08-17 RX ORDER — EPINEPHRINE 1 MG/ML
0.3 INJECTION, SOLUTION, CONCENTRATE INTRAVENOUS PRN
OUTPATIENT
Start: 2022-08-26

## 2022-08-17 RX ORDER — ACETAMINOPHEN 325 MG/1
650 TABLET ORAL
OUTPATIENT
Start: 2022-08-26

## 2022-08-17 NOTE — PROGRESS NOTES
8-8-22 labs reviewed with DR Torres. Proceed with IV iron. Called pt to discuss/arrange. No answer. Left voicemail asking for call back. Number provided.

## 2022-08-17 NOTE — PROGRESS NOTES
Discussed labs and option for IV Iron with pt via phone. All questions answered. Pt agreeable to starting IV iron. Infusion  made aware to contact pt to arrange to start in the next 1-2 weeks. Pt aware she can stop oral iron once she starts IV iron.

## 2022-08-22 ENCOUNTER — ROUTINE PRENATAL (OUTPATIENT)
Dept: OBGYN CLINIC | Age: 32
End: 2022-08-22

## 2022-08-22 VITALS — SYSTOLIC BLOOD PRESSURE: 100 MMHG | DIASTOLIC BLOOD PRESSURE: 60 MMHG | BODY MASS INDEX: 20.07 KG/M2 | WEIGHT: 132 LBS

## 2022-08-22 DIAGNOSIS — O99.019 IRON DEFICIENCY ANEMIA DURING PREGNANCY: ICD-10-CM

## 2022-08-22 DIAGNOSIS — O88.213 PULMONARY EMBOLISM AFFECTING PREGNANCY IN THIRD TRIMESTER: ICD-10-CM

## 2022-08-22 DIAGNOSIS — D50.9 IRON DEFICIENCY ANEMIA DURING PREGNANCY: ICD-10-CM

## 2022-08-22 DIAGNOSIS — O09.93 HIGH-RISK PREGNANCY IN THIRD TRIMESTER: Primary | ICD-10-CM

## 2022-08-22 DIAGNOSIS — O09.293: ICD-10-CM

## 2022-08-22 PROCEDURE — 99902 PR PRENATAL VISIT: CPT | Performed by: OBSTETRICS & GYNECOLOGY

## 2022-08-22 NOTE — PROGRESS NOTES
VITALIY @ 33wk0d:    +FM, no VB, no LOF, no major ctx. No sx of preE or DVT/PE    Vitals:    08/22/22 1128   BP: 100/60     FH 33  Bedside US confirmed cephalic  +++FHTs    1. High-risk pregnancy in third trimester    2. Pulmonary embolism affecting pregnancy in third trimester    3. History of maternal cervical laceration, currently pregnant, third trimester    4. Iron deficiency anemia during pregnancy      RC discussed. Plan reviewed to change over to heparin at 36-37 wks. Kick counts reviewed. PTL precautions reviewed. RTC 2wks for VITALIY.

## 2022-08-24 NOTE — PROGRESS NOTES
Received notification that pt's insurance did not approve Injetafer. Change to Venofer per MD.   Piero Speak pt to review. Pt did not want to change to Venofer and prefers to continue oral iron which she states she is tolerating 2tab/day. Labs reviewed. Follow up as scheduled. Infusion/financial teams updated to cancel upcoming infusion apts.

## 2022-09-06 ENCOUNTER — ROUTINE PRENATAL (OUTPATIENT)
Dept: OBGYN CLINIC | Age: 32
End: 2022-09-06
Payer: COMMERCIAL

## 2022-09-06 VITALS — SYSTOLIC BLOOD PRESSURE: 100 MMHG | DIASTOLIC BLOOD PRESSURE: 60 MMHG | BODY MASS INDEX: 20.37 KG/M2 | WEIGHT: 134 LBS

## 2022-09-06 DIAGNOSIS — O09.293: ICD-10-CM

## 2022-09-06 DIAGNOSIS — O99.019 IRON DEFICIENCY ANEMIA DURING PREGNANCY: ICD-10-CM

## 2022-09-06 DIAGNOSIS — O09.93 HIGH-RISK PREGNANCY IN THIRD TRIMESTER: Primary | ICD-10-CM

## 2022-09-06 DIAGNOSIS — O88.213 PULMONARY EMBOLISM AFFECTING PREGNANCY IN THIRD TRIMESTER: ICD-10-CM

## 2022-09-06 DIAGNOSIS — D50.9 IRON DEFICIENCY ANEMIA DURING PREGNANCY: ICD-10-CM

## 2022-09-06 PROCEDURE — 99902 PR PRENATAL VISIT: CPT | Performed by: OBSTETRICS & GYNECOLOGY

## 2022-09-06 PROCEDURE — 76816 OB US FOLLOW-UP PER FETUS: CPT | Performed by: OBSTETRICS & GYNECOLOGY

## 2022-09-06 RX ORDER — HEPARIN SODIUM 10000 [USP'U]/ML
10000 INJECTION, SOLUTION INTRAVENOUS; SUBCUTANEOUS EVERY 12 HOURS
Qty: 60 ML | Refills: 0 | Status: ON HOLD | OUTPATIENT
Start: 2022-09-06 | End: 2022-10-04 | Stop reason: HOSPADM

## 2022-09-06 NOTE — PROGRESS NOTES
28yo [id number] at 35w1d for VITALIY:    +FM, no VB, LOF or CTX  No SOB or other sx rel to DVT/PE    C/o Right hip/groin pain radiating to her right labia/cervix. On lovenox and planning to transition to heparin at 36-38wks. Vitals:    09/06/22 1120   BP: 100/60     +++FHTs  FH 28    1. High-risk pregnancy in third trimester    2. Pulmonary embolism affecting pregnancy in third trimester    3. History of maternal cervical laceration, currently pregnant, third trimester    4. Iron deficiency anemia during pregnancy      Orders Placed This Encounter   Procedures    AMB POC US OB RE-EVAL/FOLLOW UP     Orders Placed This Encounter   Medications    Heparin Sodium, Porcine, (HEPARIN, PORCINE,) 25680 UNIT/ML injection     Sig: Inject 1 mL into the skin in the morning and 1 mL in the evening. Dispense:  60 mL     Refill:  0       Pt aware of plan w/ anticoagulation pre and post delivery. (Continue lovenox for 6 wks after delivery)  - Will send Rx today for unfractionated heparin 10,000 q 12 SQ. Hx of 3rd degree lac - has been counseled and desires vaginal delivery. Scheduled w/ [facility]c for IV iron - her insurance apparently only will cover venofer which is reportedly 4-5 infusions and much more time-intensive and pt was counseled by [facility] rep that there are more risks with venofer in pregnancy thus pt has opted to continue to take oral supplements at this time. RTC 1wks for VITALIY.

## 2022-09-13 ENCOUNTER — ROUTINE PRENATAL (OUTPATIENT)
Dept: OBGYN CLINIC | Age: 32
End: 2022-09-13

## 2022-09-13 VITALS — SYSTOLIC BLOOD PRESSURE: 102 MMHG | WEIGHT: 135 LBS | BODY MASS INDEX: 20.53 KG/M2 | DIASTOLIC BLOOD PRESSURE: 60 MMHG

## 2022-09-13 DIAGNOSIS — O09.293: ICD-10-CM

## 2022-09-13 DIAGNOSIS — Z36.85 SCREENING, ANTENATAL, FOR STREPTOCOCCUS B: ICD-10-CM

## 2022-09-13 DIAGNOSIS — O09.93 HIGH-RISK PREGNANCY IN THIRD TRIMESTER: Primary | ICD-10-CM

## 2022-09-13 DIAGNOSIS — O88.213 PULMONARY EMBOLISM AFFECTING PREGNANCY IN THIRD TRIMESTER: ICD-10-CM

## 2022-09-13 PROCEDURE — 99902 PR PRENATAL VISIT: CPT | Performed by: OBSTETRICS & GYNECOLOGY

## 2022-09-13 NOTE — PROGRESS NOTES
26yo [id number] at 36w1d for VITALIY:    +FM, no VB, LOF or CTX  No SOB or other sx rel to DVT/PE    Right hip/groin pain has been at least somewhat relieved with the belly band. Pt transitioned to heparin this week without issue other than some minor irritation at injection site. Vitals:    22 0938   BP: 102/60     +++FHTs  Palpates cephalic    1. High-risk pregnancy in third trimester    2. Pulmonary embolism affecting pregnancy in third trimester    3. History of maternal cervical laceration, currently pregnant, third trimester    4. Screening, , for Streptococcus B      Orders Placed This Encounter   Procedures    Culture, Strep B Screen, Vaginal/Rectal     No orders of the defined types were placed in this encounter. Kick counts and PTL precautions reviewed. GBS collected today. Pt aware of plan w/ anticoagulation pre and post delivery. (Continue lovenox for 6 wks after delivery)  - Pt to continue unfractionated heparin 10,000 q 12 SQ. Hx of 3rd degree lac - has been counseled and desires vaginal delivery. Scheduled w/ [facility]monc for IV iron - her insurance apparently only will cover venofer which is reportedly 4-5 infusions and much more time-intensive and pt was counseled by [facility]c rep that there are more risks with venofer in pregnancy thus pt has opted to continue to take oral supplements at this time. RTC 1wks for VITALIY.

## 2022-09-16 LAB
BACTERIA SPEC CULT: ABNORMAL
SERVICE CMNT-IMP: ABNORMAL

## 2022-09-20 ENCOUNTER — ROUTINE PRENATAL (OUTPATIENT)
Dept: OBGYN CLINIC | Age: 32
End: 2022-09-20

## 2022-09-20 VITALS — BODY MASS INDEX: 20.68 KG/M2 | SYSTOLIC BLOOD PRESSURE: 102 MMHG | DIASTOLIC BLOOD PRESSURE: 60 MMHG | WEIGHT: 136 LBS

## 2022-09-20 DIAGNOSIS — O09.93 HIGH-RISK PREGNANCY IN THIRD TRIMESTER: Primary | ICD-10-CM

## 2022-09-20 DIAGNOSIS — O09.293: ICD-10-CM

## 2022-09-20 DIAGNOSIS — O88.213 PULMONARY EMBOLISM AFFECTING PREGNANCY IN THIRD TRIMESTER: ICD-10-CM

## 2022-09-20 PROCEDURE — 99902 PR PRENATAL VISIT: CPT | Performed by: OBSTETRICS & GYNECOLOGY

## 2022-09-20 NOTE — PROGRESS NOTES
28yo [id number] at 37w1d for VITALIY:    +FM, no VB, LOF or CTX    No SOB or other sx rel to DVT/PE      Pt on heparin without issue other than some minor irritation at injection site. Vitals:    09/20/22 1106   BP: 102/60     +++FHTs (173B)  Cephalic  SVE 0 (fingertip) / 80 / -1    1. High-risk pregnancy in third trimester    2. Pulmonary embolism affecting pregnancy in third trimester    3. History of maternal cervical laceration, currently pregnant, third trimester      No orders of the defined types were placed in this encounter. Will schedule IOL 10/3/22 @ 39w0d w/ Dr. Alfredo Joy. R/b/a reviewed. Form filled out to be given to [facility] coordinator today. Kick counts and PTL precautions reviewed. GBS positive    Pt aware of plan w/ anticoagulation pre and post delivery. (Continue lovenox for 6 wks after delivery)  - Pt to continue unfractionated heparin 10,000 q 12 SQ. Hx of 3rd degree lac - has been counseled and desires vaginal delivery. Scheduled w/ [facility]monc for IV iron - her insurance apparently only will cover venofer which is reportedly 4-5 infusions and much more time-intensive and pt was counseled by [facility]monc rep that there are more risks with venofer in pregnancy thus pt has opted to continue to take oral supplements at this time. RTC 1wks for VITALIY.

## 2022-09-21 ENCOUNTER — TELEPHONE (OUTPATIENT)
Dept: OBGYN CLINIC | Age: 32
End: 2022-09-21

## 2022-09-21 NOTE — TELEPHONE ENCOUNTER
RAFAEL CHINCHILLA L&D- IOL scheduled for 10/3/22 AM. Pt instructions sent via [employer]. IOL form faxed to L&D.

## 2022-09-30 ENCOUNTER — ROUTINE PRENATAL (OUTPATIENT)
Dept: OBGYN CLINIC | Age: 32
End: 2022-09-30

## 2022-09-30 VITALS — DIASTOLIC BLOOD PRESSURE: 70 MMHG | BODY MASS INDEX: 20.68 KG/M2 | WEIGHT: 136 LBS | SYSTOLIC BLOOD PRESSURE: 102 MMHG

## 2022-09-30 DIAGNOSIS — D50.9 IRON DEFICIENCY ANEMIA DURING PREGNANCY: ICD-10-CM

## 2022-09-30 DIAGNOSIS — O09.93 HIGH-RISK PREGNANCY IN THIRD TRIMESTER: Primary | ICD-10-CM

## 2022-09-30 DIAGNOSIS — O88.213 PULMONARY EMBOLISM AFFECTING PREGNANCY IN THIRD TRIMESTER: ICD-10-CM

## 2022-09-30 DIAGNOSIS — O09.293: ICD-10-CM

## 2022-09-30 DIAGNOSIS — O99.019 IRON DEFICIENCY ANEMIA DURING PREGNANCY: ICD-10-CM

## 2022-09-30 PROCEDURE — 99902 PR PRENATAL VISIT: CPT | Performed by: OBSTETRICS & GYNECOLOGY

## 2022-10-03 ENCOUNTER — HOSPITAL ENCOUNTER (INPATIENT)
Age: 32
LOS: 1 days | Discharge: HOME | End: 2022-10-04
Attending: OBSTETRICS & GYNECOLOGY | Admitting: OBSTETRICS & GYNECOLOGY
Payer: COMMERCIAL

## 2022-10-03 ENCOUNTER — ANESTHESIA EVENT (OUTPATIENT)
Dept: LABOR AND DELIVERY | Age: 32
End: 2022-10-03
Payer: COMMERCIAL

## 2022-10-03 ENCOUNTER — ANESTHESIA (OUTPATIENT)
Dept: LABOR AND DELIVERY | Age: 32
End: 2022-10-03
Payer: COMMERCIAL

## 2022-10-03 PROBLEM — Z3A.39 39 WEEKS GESTATION OF PREGNANCY: Status: ACTIVE | Noted: 2022-10-03

## 2022-10-03 PROBLEM — B95.1 GROUP BETA STREP POSITIVE: Status: ACTIVE | Noted: 2020-08-10

## 2022-10-03 LAB
ABO + RH BLD: NORMAL
APTT PPP: 27.7 SEC (ref 24.1–35.1)
BLOOD GROUP ANTIBODIES SERPL: NORMAL
ERYTHROCYTE [DISTWIDTH] IN BLOOD BY AUTOMATED COUNT: 14.8 % (ref 11.9–14.6)
HCT VFR BLD AUTO: 40.5 % (ref 35.8–46.3)
HGB BLD-MCNC: 13.5 G/DL (ref 11.7–15.4)
INR PPP: 1
MCH RBC QN AUTO: 29.4 PG (ref 26.1–32.9)
MCHC RBC AUTO-ENTMCNC: 33.3 G/DL (ref 31.4–35)
MCV RBC AUTO: 88.2 FL (ref 79.6–97.8)
NRBC # BLD: 0 K/UL (ref 0–0.2)
PLATELET # BLD AUTO: 164 K/UL (ref 150–450)
PMV BLD AUTO: 11 FL (ref 9.4–12.3)
PROTHROMBIN TIME: 13 SEC (ref 12.6–14.5)
RBC # BLD AUTO: 4.59 M/UL (ref 4.05–5.2)
SPECIMEN EXP DATE BLD: NORMAL
WBC # BLD AUTO: 9.5 K/UL (ref 4.3–11.1)

## 2022-10-03 PROCEDURE — 86900 BLOOD TYPING SEROLOGIC ABO: CPT

## 2022-10-03 PROCEDURE — 3700000025 EPIDURAL BLOCK: Performed by: ANESTHESIOLOGY

## 2022-10-03 PROCEDURE — 1100000000 HC RM PRIVATE

## 2022-10-03 PROCEDURE — 85027 COMPLETE CBC AUTOMATED: CPT

## 2022-10-03 PROCEDURE — 4A1HXCZ MONITORING OF PRODUCTS OF CONCEPTION, CARDIAC RATE, EXTERNAL APPROACH: ICD-10-PCS | Performed by: OBSTETRICS & GYNECOLOGY

## 2022-10-03 PROCEDURE — 7210000100 HC LABOR FEE PER 1 HR

## 2022-10-03 PROCEDURE — 85610 PROTHROMBIN TIME: CPT

## 2022-10-03 PROCEDURE — 0KQM0ZZ REPAIR PERINEUM MUSCLE, OPEN APPROACH: ICD-10-PCS | Performed by: OBSTETRICS & GYNECOLOGY

## 2022-10-03 PROCEDURE — 6370000000 HC RX 637 (ALT 250 FOR IP): Performed by: OBSTETRICS & GYNECOLOGY

## 2022-10-03 PROCEDURE — 3E033VJ INTRODUCTION OF OTHER HORMONE INTO PERIPHERAL VEIN, PERCUTANEOUS APPROACH: ICD-10-PCS | Performed by: OBSTETRICS & GYNECOLOGY

## 2022-10-03 PROCEDURE — 85730 THROMBOPLASTIN TIME PARTIAL: CPT

## 2022-10-03 PROCEDURE — 6360000002 HC RX W HCPCS: Performed by: STUDENT IN AN ORGANIZED HEALTH CARE EDUCATION/TRAINING PROGRAM

## 2022-10-03 PROCEDURE — 7100000010 HC PHASE II RECOVERY - FIRST 15 MIN

## 2022-10-03 PROCEDURE — 7100000011 HC PHASE II RECOVERY - ADDTL 15 MIN

## 2022-10-03 PROCEDURE — 51701 INSERT BLADDER CATHETER: CPT

## 2022-10-03 PROCEDURE — 2500000003 HC RX 250 WO HCPCS: Performed by: OBSTETRICS & GYNECOLOGY

## 2022-10-03 PROCEDURE — 10907ZC DRAINAGE OF AMNIOTIC FLUID, THERAPEUTIC FROM PRODUCTS OF CONCEPTION, VIA NATURAL OR ARTIFICIAL OPENING: ICD-10-PCS | Performed by: OBSTETRICS & GYNECOLOGY

## 2022-10-03 PROCEDURE — 7220000101 HC DELIVERY VAGINAL/SINGLE

## 2022-10-03 PROCEDURE — 6360000002 HC RX W HCPCS: Performed by: OBSTETRICS & GYNECOLOGY

## 2022-10-03 PROCEDURE — 59400 OBSTETRICAL CARE: CPT | Performed by: OBSTETRICS & GYNECOLOGY

## 2022-10-03 PROCEDURE — 2580000003 HC RX 258: Performed by: OBSTETRICS & GYNECOLOGY

## 2022-10-03 RX ORDER — TRANEXAMIC ACID 10 MG/ML
1000 INJECTION, SOLUTION INTRAVENOUS
Status: ACTIVE | OUTPATIENT
Start: 2022-10-03 | End: 2022-10-04

## 2022-10-03 RX ORDER — DOCUSATE SODIUM 100 MG/1
100 CAPSULE, LIQUID FILLED ORAL 2 TIMES DAILY
Status: DISCONTINUED | OUTPATIENT
Start: 2022-10-03 | End: 2022-10-04

## 2022-10-03 RX ORDER — ONDANSETRON 8 MG/1
8 TABLET, ORALLY DISINTEGRATING ORAL EVERY 8 HOURS PRN
Status: DISCONTINUED | OUTPATIENT
Start: 2022-10-03 | End: 2022-10-04 | Stop reason: HOSPADM

## 2022-10-03 RX ORDER — LANOLIN
CREAM (ML) TOPICAL PRN
Status: DISCONTINUED | OUTPATIENT
Start: 2022-10-03 | End: 2022-10-04 | Stop reason: HOSPADM

## 2022-10-03 RX ORDER — SODIUM CHLORIDE, SODIUM LACTATE, POTASSIUM CHLORIDE, CALCIUM CHLORIDE 600; 310; 30; 20 MG/100ML; MG/100ML; MG/100ML; MG/100ML
INJECTION, SOLUTION INTRAVENOUS CONTINUOUS
Status: DISCONTINUED | OUTPATIENT
Start: 2022-10-03 | End: 2022-10-04

## 2022-10-03 RX ORDER — SODIUM CHLORIDE, SODIUM LACTATE, POTASSIUM CHLORIDE, AND CALCIUM CHLORIDE .6; .31; .03; .02 G/100ML; G/100ML; G/100ML; G/100ML
500 INJECTION, SOLUTION INTRAVENOUS PRN
Status: DISCONTINUED | OUTPATIENT
Start: 2022-10-03 | End: 2022-10-04

## 2022-10-03 RX ORDER — IBUPROFEN 800 MG/1
800 TABLET ORAL EVERY 6 HOURS
Status: DISCONTINUED | OUTPATIENT
Start: 2022-10-03 | End: 2022-10-04 | Stop reason: HOSPADM

## 2022-10-03 RX ORDER — SODIUM CHLORIDE, SODIUM LACTATE, POTASSIUM CHLORIDE, AND CALCIUM CHLORIDE .6; .31; .03; .02 G/100ML; G/100ML; G/100ML; G/100ML
1000 INJECTION, SOLUTION INTRAVENOUS PRN
Status: DISCONTINUED | OUTPATIENT
Start: 2022-10-03 | End: 2022-10-04

## 2022-10-03 RX ORDER — SODIUM CHLORIDE 9 MG/ML
25 INJECTION, SOLUTION INTRAVENOUS PRN
Status: DISCONTINUED | OUTPATIENT
Start: 2022-10-03 | End: 2022-10-04

## 2022-10-03 RX ORDER — METHYLERGONOVINE MALEATE 0.2 MG/ML
200 INJECTION INTRAVENOUS
Status: ACTIVE | OUTPATIENT
Start: 2022-10-03 | End: 2022-10-04

## 2022-10-03 RX ORDER — ACETAMINOPHEN 500 MG
1000 TABLET ORAL EVERY 6 HOURS PRN
Status: DISCONTINUED | OUTPATIENT
Start: 2022-10-03 | End: 2022-10-04 | Stop reason: HOSPADM

## 2022-10-03 RX ORDER — SODIUM CHLORIDE 9 MG/ML
INJECTION, SOLUTION INTRAVENOUS PRN
Status: DISCONTINUED | OUTPATIENT
Start: 2022-10-03 | End: 2022-10-04 | Stop reason: HOSPADM

## 2022-10-03 RX ORDER — ROPIVACAINE HYDROCHLORIDE 2 MG/ML
INJECTION, SOLUTION EPIDURAL; INFILTRATION; PERINEURAL CONTINUOUS PRN
Status: DISCONTINUED | OUTPATIENT
Start: 2022-10-03 | End: 2022-10-03 | Stop reason: SDUPTHER

## 2022-10-03 RX ORDER — POLYETHYLENE GLYCOL 3350 17 G/17G
17 POWDER, FOR SOLUTION ORAL DAILY
Status: DISCONTINUED | OUTPATIENT
Start: 2022-10-03 | End: 2022-10-04 | Stop reason: HOSPADM

## 2022-10-03 RX ORDER — DOCUSATE SODIUM 100 MG/1
100 CAPSULE, LIQUID FILLED ORAL 2 TIMES DAILY
Status: DISCONTINUED | OUTPATIENT
Start: 2022-10-03 | End: 2022-10-04 | Stop reason: HOSPADM

## 2022-10-03 RX ORDER — OXYCODONE HYDROCHLORIDE 5 MG/1
5 TABLET ORAL EVERY 4 HOURS PRN
Status: DISCONTINUED | OUTPATIENT
Start: 2022-10-03 | End: 2022-10-04 | Stop reason: HOSPADM

## 2022-10-03 RX ORDER — MISOPROSTOL 200 UG/1
800 TABLET ORAL
Status: ACTIVE | OUTPATIENT
Start: 2022-10-03 | End: 2022-10-04

## 2022-10-03 RX ORDER — SODIUM CHLORIDE 0.9 % (FLUSH) 0.9 %
5-40 SYRINGE (ML) INJECTION PRN
Status: DISCONTINUED | OUTPATIENT
Start: 2022-10-03 | End: 2022-10-04 | Stop reason: HOSPADM

## 2022-10-03 RX ORDER — HYDROCORTISONE ACETATE PRAMOXINE HCL 2.5; 1 G/100G; G/100G
CREAM TOPICAL
Status: DISCONTINUED | OUTPATIENT
Start: 2022-10-03 | End: 2022-10-04 | Stop reason: HOSPADM

## 2022-10-03 RX ORDER — SODIUM CHLORIDE 0.9 % (FLUSH) 0.9 %
5-40 SYRINGE (ML) INJECTION EVERY 12 HOURS SCHEDULED
Status: DISCONTINUED | OUTPATIENT
Start: 2022-10-03 | End: 2022-10-04

## 2022-10-03 RX ORDER — ROPIVACAINE HYDROCHLORIDE 5 MG/ML
INJECTION, SOLUTION EPIDURAL; INFILTRATION; PERINEURAL PRN
Status: DISCONTINUED | OUTPATIENT
Start: 2022-10-03 | End: 2022-10-03 | Stop reason: SDUPTHER

## 2022-10-03 RX ORDER — ONDANSETRON 2 MG/ML
4 INJECTION INTRAMUSCULAR; INTRAVENOUS EVERY 6 HOURS PRN
Status: DISCONTINUED | OUTPATIENT
Start: 2022-10-03 | End: 2022-10-04

## 2022-10-03 RX ORDER — MAGNESIUM CARB/ALUMINUM HYDROX 105-160MG
120 TABLET,CHEWABLE ORAL DAILY PRN
Status: DISCONTINUED | OUTPATIENT
Start: 2022-10-03 | End: 2022-10-04

## 2022-10-03 RX ORDER — SODIUM CHLORIDE 0.9 % (FLUSH) 0.9 %
5-40 SYRINGE (ML) INJECTION PRN
Status: DISCONTINUED | OUTPATIENT
Start: 2022-10-03 | End: 2022-10-04

## 2022-10-03 RX ORDER — SIMETHICONE 80 MG
80 TABLET,CHEWABLE ORAL EVERY 6 HOURS PRN
Status: DISCONTINUED | OUTPATIENT
Start: 2022-10-03 | End: 2022-10-04 | Stop reason: HOSPADM

## 2022-10-03 RX ORDER — SODIUM CHLORIDE 0.9 % (FLUSH) 0.9 %
5-40 SYRINGE (ML) INJECTION EVERY 12 HOURS SCHEDULED
Status: DISCONTINUED | OUTPATIENT
Start: 2022-10-03 | End: 2022-10-04 | Stop reason: HOSPADM

## 2022-10-03 RX ORDER — CARBOPROST TROMETHAMINE 250 UG/ML
250 INJECTION, SOLUTION INTRAMUSCULAR
Status: ACTIVE | OUTPATIENT
Start: 2022-10-03 | End: 2022-10-04

## 2022-10-03 RX ORDER — ENOXAPARIN SODIUM 100 MG/ML
40 INJECTION SUBCUTANEOUS DAILY
Status: DISCONTINUED | OUTPATIENT
Start: 2022-10-04 | End: 2022-10-04 | Stop reason: HOSPADM

## 2022-10-03 RX ADMIN — SODIUM CHLORIDE, POTASSIUM CHLORIDE, SODIUM LACTATE AND CALCIUM CHLORIDE 500 ML: 600; 310; 30; 20 INJECTION, SOLUTION INTRAVENOUS at 10:50

## 2022-10-03 RX ADMIN — Medication 166.7 ML: at 14:07

## 2022-10-03 RX ADMIN — CEFAZOLIN SODIUM 2000 MG: 100 INJECTION, POWDER, LYOPHILIZED, FOR SOLUTION INTRAVENOUS at 07:50

## 2022-10-03 RX ADMIN — IBUPROFEN 800 MG: 800 TABLET, FILM COATED ORAL at 17:11

## 2022-10-03 RX ADMIN — Medication 120 ML: at 13:48

## 2022-10-03 RX ADMIN — POLYETHYLENE GLYCOL 3350 17 G: 17 POWDER, FOR SOLUTION ORAL at 17:11

## 2022-10-03 RX ADMIN — ROPIVACAINE HYDROCHLORIDE 10 ML/HR: 2 INJECTION, SOLUTION EPIDURAL; INFILTRATION; PERINEURAL at 11:27

## 2022-10-03 RX ADMIN — SODIUM CHLORIDE, POTASSIUM CHLORIDE, SODIUM LACTATE AND CALCIUM CHLORIDE: 600; 310; 30; 20 INJECTION, SOLUTION INTRAVENOUS at 07:31

## 2022-10-03 RX ADMIN — ROPIVACAINE HYDROCHLORIDE 10 ML: 5 INJECTION, SOLUTION EPIDURAL; INFILTRATION; PERINEURAL at 11:22

## 2022-10-03 RX ADMIN — Medication 87.3 MILLI-UNITS/MIN: at 15:36

## 2022-10-03 NOTE — PROGRESS NOTES
Tess Quintanilla at bedside at [phone]. DAYSI Looney at bedside at [phone]    Assisted pt to sitting up on bedside at . Timeout completed at [phone] with MD, DAYSI and myself at bedside. Test dose given at 1122. Negative reaction. Dose given at 1127. Pt assisted to lying back in left tilt position. See anesthesia record for details. See vital sign flow sheet for BP. Tolerated procedure well.

## 2022-10-03 NOTE — PROGRESS NOTES
Pushing initiated at 1320. Delivery of viable female infant at 26. Apgars 9,9. VSS. Placenta delivered at 1413. Pitocin bolus initiated per MD order. See MAR. Repair in progress.

## 2022-10-03 NOTE — PROGRESS NOTES
SBAR OUT Report: Mother    Verbal report given to All Sesay RN (full name & credentials) on this patient, who is now being transferred to MIU (unit) for routine progression of patient care. The patient is not wearing a green \"Anesthesia-Duramorph\" band. Report consisted of patient's Situation, Background, Assessment and Recommendations (SBAR). Mellen ID bands were compared with the identification form, and verified with the patient and receiving nurse. Information from the Nurse Handoff Report and the [place] Report was reviewed with the receiving nurse; opportunity for questions and clarification provided.

## 2022-10-03 NOTE — ANESTHESIA PROCEDURE NOTES
Epidural Block    Patient location during procedure: OB  Start time: 10/3/2022 11:18 AM  End time: 10/3/2022 11:26 AM  Reason for block: labor epidural  Staffing  Performed: anesthesiologist   Anesthesiologist: Estefani Luna MD  Epidural  Patient position: sitting  Prep: ChloraPrep  Patient monitoring: continuous pulse ox and frequent blood pressure checks  Approach: midline  Location: L4-5  Injection technique: ROYER saline  Provider prep: mask and sterile gloves  Needle  Needle type: Tuohy   Needle gauge: 17 G  Needle length: 3.5 in  Catheter type: end hole  Catheter size: 19 G  Test dose: negativeCatheter Secured: tegaderm  Assessment  Hemodynamics: stable  Attempts: 1  Outcomes: uncomplicated  Preanesthetic Checklist  Completed: patient identified, IV checked, site marked, risks and benefits discussed, surgical/procedural consents, equipment checked, timeout performed, anesthesia consent given, oxygen available and monitors applied/VS acknowledged
Statement Selected

## 2022-10-03 NOTE — H&P
History & Physical    Name: Tressa Kothari MRN: [id number]  SSN: [id number]    YOB: 1990  Age: 33 y.o. Sex: female      Subjective:     Estimated Date of Delivery: 10/10/22  OB History    Para Term  AB Living   3 2 2     2   SAB IAB Ectopic Molar Multiple Live Births             2      # Outcome Date GA Lbr Ari/2nd Weight Sex Delivery Anes PTL Lv   3 Current            2 Term 08/10/20 39w5d 16:40 / 01:46 7 lb 15 oz (3.6 kg) F Vag-Spont EPI N ANNELISE   1 Term 18 39w2d / 03:38 7 lb 11.5 oz (3.5 kg) M Vag-Vacuum  N ANNELISE      Birth Comments: 3rd degree lac       Ms. Lisa Hayden is admitted with pregnancy at 39w0d for induction of labor due to maternal hx of DVT/PE this pregnancy on heparin (held last PM). Prenatal course was complicated by  blood clot as mentioned and anemia as well as history of 3rd degree lac and pt has declined a PLTCD and has been counseled. Please see prenatal records for details. No past medical history on file. No past surgical history on file. Social History     Occupational History    Not on file   Tobacco Use    Smoking status: Never    Smokeless tobacco: Never   Substance and Sexual Activity    Alcohol use: Not on file    Drug use: Not on file    Sexual activity: Not on file     No family history on file. Allergies   Allergen Reactions    Amoxicillin Hives     Prior to Admission medications    Medication Sig Start Date End Date Taking? Authorizing Provider   Heparin Sodium, Porcine, (HEPARIN, PORCINE,) 26703 UNIT/ML injection Inject 1 mL into the skin in the morning and 1 mL in the evening.  9/6/22 10/6/22  Devin Palacios MD   Prenatal MV-Min-Fe Fum-FA-DHA (PRENATAL 1 PO) Take by mouth    Historical Provider, MD   acetaminophen (TYLENOL) 500 MG tablet Take 1,000 mg by mouth every 6 hours as needed for Pain    Historical Provider, MD   ferrous sulfate (IRON 325) 325 (65 Fe) MG tablet Take 2 tablets by mouth daily Take with Vitamin C source 22 Deng Frias MD   CALCIUM PO Take by mouth    Ar Automatic Reconciliation   FOLIC ACID PO Take by mouth    Ar Automatic Reconciliation        Review of Systems:   Pertinent ROS noted in the HPI. Objective:     Vitals:  Vitals:    10/03/22 1416 10/03/22 1417 10/03/22 1431 10/03/22 1446   BP: 100/62  99/61 97/65   Pulse: 58  71 56   Resp:       Temp:  97.9 °F (36.6 °C)     TempSrc:  Oral     SpO2:            Physical Exam:  Constitutional: no distress  Heart: RRR  Lungs: CTAB  Abdomen: soft, nontender  LE: no swelling, no CT  SVE: 5/42/-9, cephalic  Membranes:  Artificial Rupture of Membranes; Amniotic Fluid: medium amount of clear fluid  Fetal Heart Rate: Reactive          Prenatal Labs:   Lab Results   Component Value Date/Time    ABORH A POSITIVE 10/03/2022 07:33 AM    GONNOEXT Negative 02/05/2020 12:00 AM    CHLAMEXT Negative 02/05/2020 12:00 AM       Impression/Plan:     Principal Problem:    39 weeks gestation of pregnancy  Active Problems:    Iron deficiency anemia during pregnancy    Group beta Strep positive    High-risk pregnancy in third trimester    Pulmonary embolism affecting pregnancy in third trimester  Resolved Problems:    * No resolved hospital problems. *       Plan: Admit for induction of labor. Group B Strep positive, will treat prophylactically with ancef. IOL Counseling:     Factual information regarding the medical condition and the need for induction of labor was discussed with the patient, who verbalized understanding. The therapeutic rationale, benefits, risks and potential complications were discussed, including the possibility of pain, bleeding, infection, loss of function, disfigurement, death or other unforeseen complications. Alternatives to the procedure were discussed (including potential risks, complications and benefits of each). No guarantee was expressed or implied as to the results of the procedure.  Informed consent was given, as well as a request to proceed.

## 2022-10-03 NOTE — ANESTHESIA PRE PROCEDURE
Department of Anesthesiology  Preprocedure Note       Name:  Cullen Luevano   Age:  33 y.o.  :  1990                                          MRN:  [id number]         Date:  10/3/2022      Surgeon: * No surgeons listed *    Procedure: * No procedures listed *    Medications prior to admission:   Prior to Admission medications    Medication Sig Start Date End Date Taking? Authorizing Provider   Heparin Sodium, Porcine, (HEPARIN, PORCINE,) 66261 UNIT/ML injection Inject 1 mL into the skin in the morning and 1 mL in the evening.  9/6/22 10/6/22  Mario Alberto Du MD   Prenatal MV-Min-Fe Fum-FA-DHA (PRENATAL 1 PO) Take by mouth    Historical Provider, MD   acetaminophen (TYLENOL) 500 MG tablet Take 1,000 mg by mouth every 6 hours as needed for Pain    Historical Provider, MD   ferrous sulfate (IRON 325) 325 (65 Fe) MG tablet Take 2 tablets by mouth daily Take with Vitamin C source 22   Ady Mason MD   CALCIUM PO Take by mouth    Ar Automatic Reconciliation   FOLIC ACID PO Take by mouth    Ar Automatic Reconciliation       Current medications:    Current Facility-Administered Medications   Medication Dose Route Frequency Provider Last Rate Last Admin    lactated ringers infusion   IntraVENous Continuous Mario Alberto Du  mL/hr at 10/03/22 0731 New Bag at 10/03/22 0731    lactated ringers bolus  500 mL IntraVENous PRN Mario Alberto Du MD        Or    lactated ringers bolus  1,000 mL IntraVENous PRN Mario Alberto Du MD        sodium chloride flush 0.9 % injection 5-40 mL  5-40 mL IntraVENous 2 times per day Mario Alberto Du MD        sodium chloride flush 0.9 % injection 5-40 mL  5-40 mL IntraVENous PRN Mario Alberto Du MD        0.9 % sodium chloride infusion  25 mL IntraVENous PRN Mario Alberto Du MD        methylergonovine (METHERGINE) injection 200 mcg  200 mcg IntraMUSCular Once PRN Mario Alberto Du MD        carboprost (HEMABATE) injection 250 mcg  250 mcg IntraMUSCular Once PRN Lindy Cardoso MD        miSOPROStol (CYTOTEC) tablet 800 mcg  800 mcg Rectal Once PRN Lindy Cardoso MD        tranexamic acid-NaCl IVPB premix 1,000 mg  1,000 mg IntraVENous Once PRN Lindy Cardoso MD        oxytocin (PITOCIN) 30 units in 500 mL infusion  87.3 rick-units/min IntraVENous Continuous PRN Lindy Cardoso MD        And    oxytocin (PITOCIN) 10 unit bolus from the bag  10 Units IntraVENous PRN Lindy Cardoso MD        ondansetron [facility]) injection 4 mg  4 mg IntraVENous Q6H PRN Lindy Cardoso MD        docusate sodium (COLACE) capsule 100 mg  100 mg Oral BID Lindy Cardoso MD        butorphanol (STADOL) injection 1 mg  1 mg IntraVENous Q3H PRN Lindy Cardoso MD        oxytocin (PITOCIN) 30 units in 500 mL infusion  1-20 rick-units/min IntraVENous Continuous Lindy Cardoso MD 6 mL/hr at 10/03/22 0954 6 rick-units/min at 10/03/22 0954    ceFAZolin (ANCEF) 1,000 mg in sodium chloride 0.9 % 50 mL IVPB (mini-bag)  1,000 mg IntraVENous Aditya Camejo MD           Allergies: Allergies   Allergen Reactions    Amoxicillin Hives       Problem List:    Patient Active Problem List   Diagnosis Code    Migraine without aura with status migrainosus G43.001    Group beta Strep positive B95.1    History of maternal cervical laceration, currently pregnant, third trimester O09.293    High-risk pregnancy in third trimester O09.93    History of migraine during pregnancy Z86.69, Z87.59    History of anemia Z86.2    Pulmonary embolism affecting pregnancy in third trimester O88.213    Iron deficiency anemia during pregnancy O99.019, D50.9    39 weeks gestation of pregnancy Z3A.39       Past Medical History:  No past medical history on file. Past Surgical History:  No past surgical history on file.     Social History:    Social History     Tobacco Use    Smoking status: Never    Smokeless tobacco: Never   Substance Use Topics    Alcohol use: Not on file                                Counseling given: Not Answered      Vital Signs (Current):   Vitals:    10/03/22 0850 10/03/22 0919 10/03/22 0950 10/03/22 1020   BP: 102/60 113/63 114/78 114/77   Pulse: 93 88 68 68   Resp:       Temp:       TempSrc:       SpO2:                                                  BP Readings from Last 3 Encounters:   10/03/22 114/77   09/30/22 102/70   09/20/22 102/60       NPO Status:                                                                                 BMI:   Wt Readings from Last 3 Encounters:   09/30/22 136 lb (61.7 kg)   09/20/22 136 lb (61.7 kg)   09/13/22 135 lb (61.2 kg)     There is no height or weight on file to calculate BMI.    CBC:   Lab Results   Component Value Date/Time    WBC 9.5 10/03/2022 07:36 AM    RBC 4.59 10/03/2022 07:36 AM    HGB 13.5 10/03/2022 07:36 AM    HCT 40.5 10/03/2022 07:36 AM    MCV 88.2 10/03/2022 07:36 AM    RDW 14.8 10/03/2022 07:36 AM     10/03/2022 07:36 AM       CMP:   Lab Results   Component Value Date/Time     08/08/2022 09:22 AM    K 3.7 08/08/2022 09:22 AM     08/08/2022 09:22 AM    CO2 25 08/08/2022 09:22 AM    BUN 11 08/08/2022 09:22 AM    CREATININE 0.50 08/08/2022 09:22 AM    GFRAA >60 08/08/2022 09:22 AM    AGRATIO 1.0 05/02/2022 11:00 AM    LABGLOM >60 08/08/2022 09:22 AM    GLUCOSE 77 08/08/2022 09:22 AM    PROT 6.4 08/08/2022 09:22 AM    CALCIUM 9.2 08/08/2022 09:22 AM    BILITOT 0.3 08/08/2022 09:22 AM    ALKPHOS 77 08/08/2022 09:22 AM    ALKPHOS 57 05/02/2022 11:00 AM    AST 13 08/08/2022 09:22 AM    ALT 17 08/08/2022 09:22 AM       POC Tests: No results for input(s): POCGLU, POCNA, POCK, POCCL, POCBUN, POCHEMO, POCHCT in the last 72 hours.     Coags:   Lab Results   Component Value Date/Time    PROTIME 13.0 03/13/2022 01:28 PM    INR 0.9 03/13/2022 01:28 PM       HCG (If Applicable): No results found for: PREGTESTUR, PREGSERUM, HCG, HCGQUANT     ABGs:   Lab Results Component Value Date/Time    PHART 7.39 08/10/2020 05:19 PM    PO2ART 34 08/10/2020 05:19 PM    OYB1WCD 36.5 08/10/2020 05:19 PM    QTU1YXA 22.0 08/10/2020 05:19 PM        Type & Screen (If Applicable):  No results found for: LABABO, LABRH    Drug/Infectious Status (If Applicable):  No results found for: HIV, HEPCAB    COVID-19 Screening (If Applicable):   Lab Results   Component Value Date/Time    COVID19 Not detected 03/09/2022 12:20 PM           Anesthesia Evaluation  Patient summary reviewed and Nursing notes reviewed no history of anesthetic complications:   Airway: Mallampati: II  TM distance: >3 FB   Neck ROM: full  Mouth opening: > = 3 FB   Dental: normal exam         Pulmonary: breath sounds clear to auscultation                            ROS comment: PE in 3rd trimester (tested neg for COVID at time)  Was on lovenox; transitioned to heparin (high dose) at 10,000units BID  Last dose 640 PM yesterday   Cardiovascular:  Exercise tolerance: good (>4 METS),           Rhythm: regular  Rate: normal                    Neuro/Psych:   (+) headaches:,             GI/Hepatic/Renal: Neg GI/Hepatic/Renal ROS            Endo/Other: Negative Endo/Other ROS                    Abdominal:             Vascular: Other Findings:           Anesthesia Plan      epidural     ASA 2     (ARANZA recommendation to wait 12 and and check coags prior to epidural if on high dose heparin. Coags sent. Discussed with patient. Addendum: labs returned normal)        Anesthetic plan and risks discussed with patient.                         Trey Oliva MD   10/3/2022

## 2022-10-03 NOTE — PROGRESS NOTES
Pt admitted to [facility] for IOL. Consents signed and witnessed. IV started and labs sent. EFM/toco applied. VSS. SVE ft/80/-3. Admission assessment as documented. SCD's applied bilaterally.

## 2022-10-03 NOTE — ANESTHESIA POSTPROCEDURE EVALUATION
Department of Anesthesiology  Postprocedure Note    Patient: Jimmie Webster  MRN: [id number]  YOB: 1990  Date of evaluation: 10/3/2022      Procedure Summary     Date: 10/03/22 Room / Location:     Anesthesia Start: 3738 Anesthesia Stop: 2899    Procedure: Labor Analgesia Diagnosis:     Scheduled Providers:  Responsible Provider: Jesus Riddle MD    Anesthesia Type: epidural ASA Status: 2          Anesthesia Type: No value filed.     Guille Phase I:      Guille Phase II:        Anesthesia Post Evaluation    Patient location during evaluation: PACU  Patient participation: complete - patient participated  Level of consciousness: awake and alert  Airway patency: patent  Nausea & Vomiting: no nausea  Cardiovascular status: hemodynamically stable  Respiratory status: acceptable  Hydration status: euvolemic

## 2022-10-03 NOTE — L&D DELIVERY NOTE
Delivery Note    Obstetrician:  Leah Morillo MD    Pre-Delivery Diagnosis: Term pregnancy, Induced labor, Single fetus, and Pregnancy complicated by: maternal hx of PE this pregnancy managed on heparin which was held for delivery, hx of 3rd degree lac & declines section, hx of anemia as well. Post-Delivery Diagnosis: Living  infant(s) and Female \"Adalynn\"    Intrapartum Event: None    Procedure: Spontaneous vaginal delivery    Epidural: Yes    Monitor:  Fetal Heart Tones - External and Uterine Contractions - External    Indications for instrumental delivery: none    Estimated Blood Loss: 370    Episiotomy: none    Laceration(s):  2nd degree    Laceration(s) repair: Yes    Presentation: Cephalic    Fetal Description: tovar    Fetal Position: Left Occiput Anterior    BABY INFORMATION  NAME:   Information for the patient's :  Sridevi Humphries [[id number]]   Baby Girl Jey Due   SEX: female  DATE AND TIME OF BIRTH:   Information for the patient's :  Sridevi Humphries [[id number]]   10/3/2021  at   Information for the patient's :  Sridevi Humphries [[id number]]   [id number]   BIRTH MEASUREMENTS:   Information for the patient's :  Sridevi Humphries [[id number]]     and   Information for the patient's :  Sridevi Humphries [[id number]]    . APGARS:  Information for the patient's :  Sridevi Humphries [[id number]]       Information for the patient's :  Cory Yarbrough [[id number]]        Umbilical Cord: Nuchal Cord x  1, 3 vessels present, and Cord blood sent to lab for type, Rh, and Alena' test    Specimens: placenta was disposed           Complications:  none           Lab Results   Component Value Date/Time    ABORH A POSITIVE 10/03/2022 07:33 AM            Attending Attestation: I performed the procedure. No dystocia was encountered. Nuchal cord was noted and delivered through.  Delayed cord clamping was performed after which the cord was doubly clamped and cut. Pitocin was started IV. Perineum was noted to have a 2nd degree lac which was repaired with a 3-0 vicryl, however, tissue was very friable and tore easily. After repair was complete, fibrillar hemostatic agent was placed in areas of continued oozing, hemostasis was achieved. Will continue pitocin at high rate and add a 2nd bag after that which can be at normal rate if lochia remains appropriate. Will plan to restart lovenox in the morning as well and this has been ordered. Ruby Ennis MD FACOG                  Mother's Information      Labor Events     Labor?: No  Cervical Ripening:   Now               Jennyfer Hooks [[id number]]      Labor Events     Labor?: No   Steroids?: None  Cervical Ripening Date/Time:     Antibiotics Received during Labor?: Yes  Rupture Date/Time: 10/3/22 11:57:00   Rupture Type: AROM  Fluid Color: Clear  Fluid Odor: None  Fluid Volume:  Moderate  Induction: Oxytocin  Augmentation: AROM  Labor Complications: None       Anesthesia    Method: Epidural       Start Pushing      Labor onset date/time: 10/3/22 11:57:00 Now     Dilation complete date/time: 10/3/22 13:08:00 EDT Now     Start pushing date/time: 10/3/2022 13:20:29   Decision date/time (emergent ):           Delivery ([place])      Delivery Date/Time:  10/3/22 14:04:00   Delivery Type: Vaginal, Spontaneous    Details:             Presentation    Presentation: Vertex  Position: Left  _: Occiput  _: Anterior       Shoulder Dystocia    Shoulder Dystocia Present?: No  Add Second Maneuver  Add Third Maneuver  Add Fourth Maneuver  Add Fifth Maneuver  Add Sixth Maneuver  Add Seventh Maneuver  Add Eighth Maneuver  Add Ninth Maneuver       Assisted Delivery Details    Forceps Attempted?: No  Vacuum Extractor Attempted?: No       Document Additional Attempt         Document Additional Attempt                 Cord Vessels: 3 Vessels  Complications: Nuchal Tight  Cord Around: Head  Delayed Cord Clamping?: Yes  Cord Clamped Date/Time: 10/3/2022 14:06:00  Cord Blood Disposition: Lab  Gases Sent?: No       Placenta    Date/Time: 10/3/2022 14:13:08  Removal: Spontaneous  Appearance: Intact  Disposition: Discarded       Lacerations    Episiotomy: None  Perineal Lacerations: 2nd  Other Lacerations: no non-perineal laceration  Number of Repair Packets: 2       Vaginal Counts    Initial Count Personnel: Richard Spoon; ST  Initial Count Verified By: Caio Nation   Initial Counts Correct Correct Correct   Final Counts Correct Correct Correct   Final Count Personnel: Richard Spoon; ST  Final Count Verified By: Keily Estrada  Accurate Final Count?: Yes  If the count is incorrect due to Intentionally Retained Foreign Object (IRFO) add the IRFO LDA in Lines/Drains.   Add LDA: Link to [facility]       Blood Loss  Mother: Arlena Bosworth #[id number]     Start of Mother's Information      Delivery Blood Loss  10/03/22 1157 - 10/03/22 1504      Quantitative Blood Loss (mL) Hospital Encounter 370 grams    Total  370 mL               End of Mother's Information  Mother: Arlena Bosworth #[id number]                Delivery Providers    Delivering clinician: Brendon Multani MD     Provider Role    Brendon Multani MD Obstetrician    Aggie Hoffman, RN Primary Nurse    Hansa Adorno, RN Primary [place] Nurse    Zenaida Guevara MD Anesthesiologist    Jose Hillman APRN - CRNA Nurse Anesthetist    Rupert Roberts RN Charge Nurse    Lethea Schwab, RN Primary Nurse               Assessment    Living Status: Living  Delivery Location Comment: 434     Apgar Scoring Key:    0 1 2    Skin Color: Blue or pale Acrocyanotic Completely pink    Heart Rate: Absent <100 bpm >100 bpm    Reflex Irritability: No response Grimace Cry or active withdrawal    Muscle Tone: Limp Some flexion Active motion    Respiratory Effort: Absent Weak cry; hypoventilation Good, crying                      Skin Color:   Heart Rate:   Reflex Irritability:   Muscle Tone:   Respiratory Effort:    Total:            1 Minute:    1    2    2    2    2    9        Apgar 1 total from OB History    5 Minute:    1    2    2    2    2    9        Apgar 5 total from OB History    10 Minute:              15 Minute:              20 Minute:                        Apgars Assigned By: Elian Brown              Resuscitation    Method: Bulb Suction, Room Air, Stimulation             [place] Measurements      Birth Weight: 3540 g Birth Length: 0.53 m     Head Circumference: 0.355 m Chest Circumference: 0.33 m              Title      Skin to Skin Initiation Date/Time: 10/3/22 14:25:00 EDT     Skin to Skin With: Mother     Skin to Skin End Date/Time:       Breastfeeding Initiated Date/Time: 10/3/2022 14:47:00

## 2022-10-04 VITALS
DIASTOLIC BLOOD PRESSURE: 59 MMHG | HEART RATE: 93 BPM | TEMPERATURE: 97.8 F | RESPIRATION RATE: 16 BRPM | SYSTOLIC BLOOD PRESSURE: 95 MMHG | OXYGEN SATURATION: 97 %

## 2022-10-04 PROCEDURE — 6360000002 HC RX W HCPCS: Performed by: OBSTETRICS & GYNECOLOGY

## 2022-10-04 PROCEDURE — 6370000000 HC RX 637 (ALT 250 FOR IP): Performed by: OBSTETRICS & GYNECOLOGY

## 2022-10-04 RX ORDER — IBUPROFEN 800 MG/1
800 TABLET ORAL EVERY 8 HOURS PRN
Qty: 90 TABLET | Refills: 0 | Status: SHIPPED | OUTPATIENT
Start: 2022-10-04

## 2022-10-04 RX ORDER — ENOXAPARIN SODIUM 100 MG/ML
40 INJECTION SUBCUTANEOUS DAILY
Qty: 30 EACH | Refills: 1 | Status: SHIPPED | OUTPATIENT
Start: 2022-10-05

## 2022-10-04 RX ADMIN — IBUPROFEN 800 MG: 800 TABLET, FILM COATED ORAL at 14:43

## 2022-10-04 RX ADMIN — DOCUSATE SODIUM 100 MG: 100 CAPSULE ORAL at 08:09

## 2022-10-04 RX ADMIN — ENOXAPARIN SODIUM 40 MG: 100 INJECTION SUBCUTANEOUS at 08:08

## 2022-10-04 RX ADMIN — IBUPROFEN 800 MG: 800 TABLET, FILM COATED ORAL at 02:14

## 2022-10-04 RX ADMIN — POLYETHYLENE GLYCOL 3350 17 G: 17 POWDER, FOR SOLUTION ORAL at 08:09

## 2022-10-04 RX ADMIN — IBUPROFEN 800 MG: 800 TABLET, FILM COATED ORAL at 08:09

## 2022-10-04 ASSESSMENT — PAIN SCALES - GENERAL: PAINLEVEL_OUTOF10: 4

## 2022-10-04 ASSESSMENT — PAIN DESCRIPTION - LOCATION: LOCATION: PERINEUM

## 2022-10-04 NOTE — DISCHARGE INSTRUCTIONS
After Your Delivery (the Postpartum Period): Care Instructions  Overview     Congratulations on the birth of your baby. Like pregnancy, the  period can be a time of excitement, qi, and exhaustion. You may look at your wondrous little baby and feel happy. You may also be overwhelmed by your new sleep hours and new responsibilities. At first, babies often sleep during the days and are awake at night. They do not have a pattern or routine. They may make sudden gasps, jerk themselves awake, or look like they have crossed eyes. These are all normal, and they may even make you smile. In these first weeks after delivery, try to take good care of yourself. It may take 4 to 6 weeks to feel like yourself again, and possibly longer if you had a  birth. You will likely feel very tired for several weeks. Your days will be full of ups and downs, but lots of qi as well. Follow-up care is a key part of your treatment and safety. Be sure to make and go to all appointments, and call your doctor if you are having problems. It's also a good idea to know your test results and keep a list of the medicines you take. How can you care for yourself at home? Take care of your body after delivery  Use pads instead of tampons for the bloody flow that may last as long as 2 weeks. Ease cramps with ibuprofen (Advil, Motrin). Ease soreness of hemorrhoids and the area between your vagina and rectum with ice compresses or witch hazel pads. Ease constipation by drinking lots of fluid and eating high-fiber foods. Ask your doctor about over-the-counter stool softeners. Cleanse yourself with a gentle squeeze of warm water from a bottle instead of wiping with toilet paper. Take a sitz bath in warm water several times a day. Wear a good nursing bra. Ease sore and swollen breasts with warm, wet washcloths. If you aren't breastfeeding, use ice rather than heat for breast soreness.   Your period may not start for several months if you are breastfeeding. You may bleed more, and longer at first, than you did before you got pregnant. Wait until you are healed (about 4 to 6 weeks) before you have sex. Ask your doctor when it is okay for you to have sex. Try not to travel with your baby for 5 or 6 weeks. If you take a long car trip, make frequent stops to walk around and stretch. Avoid exhaustion  Rest every day. Try to nap when your baby naps. Ask another adult to be with you for a few days after delivery. Plan for  if you have other children. Stay flexible so you can eat at odd hours and sleep when you need to. Both you and your baby are making new schedules. Plan small trips to get out of the house. Change can make you feel less tired. Ask for help with housework, cooking, and shopping. Remind yourself that your job is to care for your baby. Know about help for postpartum depression  \"Baby blues\" are common for the first 1 to 2 weeks after birth. You may cry or feel sad or irritable for no reason. Rest whenever you can. Being tired makes it harder to handle your emotions. Go for walks with your baby. Talk to your partner, friends, and family about your feelings. If your symptoms last for more than a few weeks, or if you feel very depressed, ask your doctor for help. Postpartum depression can be treated. Support groups and counseling can help. Sometimes medicine can also help. Stay healthy  Eat healthy foods so you have more energy. If you breastfeed, avoid drugs. If you quit smoking during pregnancy, try to stay smoke-free. If you choose to have a drink now and then, have only one drink, and limit the number of occasions that you have a drink. Wait to breastfeed at least 2 hours after you have a drink to reduce the amount of alcohol the baby may get in the milk. Start daily exercise after 4 to 6 weeks, but rest when you feel tired. Learn exercises to tone your belly.  Try Kegel exercises to regain strength in your pelvic muscles. You can do these exercises while you stand or sit. (If doing these exercises causes pain, stop doing them and talk with your doctor.)  Squeeze your muscles as if you were trying not to pass gas. Or squeeze your muscles as if you were stopping the flow of urine. Your belly, legs, and buttocks shouldn't move. Hold the squeeze for 3 seconds, then relax for 5 to 10 seconds. Start with 3 seconds, then add 1 second each week until you are able to squeeze for 10 seconds. Repeat the exercise 10 times a session. Do 3 to 8 sessions a day. Find a class for you and your baby that has an exercise time. If you had a  birth, give yourself a bit more time before you exercise, and be careful. When should you call for help? Share this information with your partner, family, or a friend. They can help you watch for warning signs. Call 911  anytime you think you may need emergency care. For example, call if:    You have thoughts of harming yourself, your baby, or another person. You passed out (lost consciousness). You have chest pain, are short of breath, or cough up blood. You have a seizure. Call your doctor now or seek immediate medical care if:    You have signs of hemorrhage (too much bleeding), such as:  Heavy vaginal bleeding. This means that you are soaking through one or more pads in an hour. Or you pass blood clots bigger than an egg. Feeling dizzy or lightheaded, or you feel like you may faint. Feeling so tired or weak that you cannot do your usual activities. A fast or irregular heartbeat. New or worse belly pain. You have signs of infection, such as:  A fever. Vaginal discharge that smells bad. New or worse belly pain. You have symptoms of a blood clot in your leg (called a deep vein thrombosis), such as:  Pain in the calf, back of the knee, thigh, or groin. Redness and swelling in your leg or groin.      You have signs of preeclampsia, such as:  Sudden swelling of your face, hands, or feet. New vision problems (such as dimness, blurring, or seeing spots). A severe headache. Watch closely for changes in your health, and be sure to contact your doctor if:    Your vaginal bleeding isn't decreasing. You feel sad, anxious, or hopeless for more than a few days. You are having problems with your breasts or breastfeeding. Where can you learn more? Go to https://TextualAdspepiceweb.Live Shuttle. org and sign in to your [employer]Advance account. Enter P883 in the Givey box to learn more about \"After Your Delivery (the Postpartum Period): Care Instructions. \"     If you do not have an account, please click on the \"Sign Up Now\" link. Current as of: February 23, 2022               Content Version: 13.4  © 2006-2022 [employer]wise, [employer]. Care instructions adapted under license by [facility] ([facility]). If you have questions about a medical condition or this instruction, always ask your healthcare professional. [place] [address] any warranty or liability for your use of this information. DISCHARGE SUMMARY from Nurse      What to do at Home:  Call MD office or go to the ER if experiencing fever (greater than 100.4 deg F), heavy vaginal bleeding greater than full soaking of 1 pad per hour x 2 or more hours, foul smelling vaginal discharge, thoughts of harm to self and\or others, pain uncontrolled with oral medication, or any other major medical concerns. *  Please give a list of your current medications to your Primary Care Provider. *  Please update this list whenever your medications are discontinued, doses are      changed, or new medications (including over-the-counter products) are added. *  Please carry medication information at all times in case of emergency situations.     These are general instructions for a healthy lifestyle:    No smoking/ No tobacco products/ Avoid exposure to second hand smoke  Surgeon Yesica Rodriguez Warning:  Quitting smoking now greatly reduces serious risk to your health. Obesity, smoking, and sedentary lifestyle greatly increases your risk for illness    A healthy diet, regular physical exercise & weight monitoring are important for maintaining a healthy lifestyle    You may be retaining fluid if you have a history of heart failure or if you experience any of the following symptoms:  Weight gain of 3 pounds or more overnight or 5 pounds in a week, increased swelling in our hands or feet or shortness of breath while lying flat in bed. Please call your doctor as soon as you notice any of these symptoms; do not wait until your next office visit. The discharge information has been reviewed with the patient. The patient verbalized understanding. Discharge medications reviewed with the patient and appropriate educational materials and side effects teaching were provided.   ___________________________________________________________________________________________________________________________________

## 2022-10-04 NOTE — LACTATION NOTE

## 2022-10-04 NOTE — CARE COORDINATION
Chart reviewed - no needs identified. SW met with patient to complete initial assessment. Patient denies any history of postpartum depression/anxiety. Patient given informational packet on  mood & anxiety disorders (resources/education). Family denies any additional needs from  at this time. Family has 's contact information should any needs/questions arise.     NAMRATA Miller, [facility]   [phone]

## 2022-10-04 NOTE — LACTATION NOTE
This note was copied from a baby's chart. In to see mom and infant prior to discharge to home. Experienced mom stated that infant has been latching and nursing well but would like an observation due to issues in the past with latch after discharge to home. Mom placed infant to her right breast in the cross cradle hold and infant latched and started to suck rhythmically. Reviewed with mom how to position infant to get and maintain a deeper latch. Mom stated that it felt much better. Reviewed discharge information and answered questions. Mom and infant are planning an early discharge today and are following up with [facility] tomorrow and will see lactation consultant there.

## 2022-10-04 NOTE — PROGRESS NOTES
Post-Partum Day Number 1 Progress Note  Riky Ma  [id number]    Patient doing well post-partum without significant complaint. Voiding without difficulty, normal lochia.     Vitals:  Patient Vitals for the past 24 hrs:   BP Temp Temp src Pulse Resp SpO2   10/04/22 0741 96/66 97.9 °F (36.6 °C) Oral 83 16 99 %   10/04/22 0207 98/67 97.5 °F (36.4 °C) Oral 66 18 97 %   10/03/22 2010 104/64 98.1 °F (36.7 °C) Oral 80 18 96 %   10/03/22 1635 101/72 98.3 °F (36.8 °C) Oral (!) 103 18 --   10/03/22 1616 108/72 -- -- 83 -- --   10/03/22 1601 102/73 -- -- 88 -- --   10/03/22 1546 96/64 -- -- 77 -- --   10/03/22 1531 93/63 -- -- 58 -- --   10/03/22 1516 101/73 -- -- 76 -- --   10/03/22 1501 98/69 -- -- 74 -- --   10/03/22 1446 97/65 -- -- 56 -- --   10/03/22 1431 99/61 -- -- 71 -- --   10/03/22 1417 -- 97.9 °F (36.6 °C) Oral -- -- --   10/03/22 1416 100/62 -- -- 58 -- --   10/03/22 1402 (!) 10259 -- -- 77 -- --   10/03/22 1346 102/63 -- -- 93 -- --   10/03/22 1332 101/65 -- -- 84 -- --   10/03/22 1316 106/68 -- -- 73 -- --   10/03/22 1302 (!) 97/59 -- -- 63 -- --   10/03/22 1247 102/65 -- -- 70 -- --   10/03/22 1231 114/71 -- -- 67 -- --   10/03/22 1217 106/63 -- -- 68 -- --   10/03/22 1206 102/63 -- -- 72 -- --   10/03/22 1159 104/62 -- -- 75 -- --   10/03/22 1152 102/60 -- -- 72 -- --   10/03/22 1148 106/66 -- -- 71 -- --   10/03/22 1142 103/62 -- -- 81 -- --   10/03/22 1138 (!) 99/56 -- -- 75 -- --   10/03/22 1132 (!) 110/57 -- -- 81 -- --   10/03/22 1131 106/60 -- -- 75 -- --   10/03/22 1130 108/64 -- -- 69 -- --   10/03/22 1129 105/65 -- -- 71 -- --   10/03/22 1127 109/66 -- -- 64 -- --   10/03/22 1125 99/66 -- -- 86 -- --   10/03/22 1124 106/70 -- -- 65 -- --   10/03/22 1123 100/66 98.3 °F (36.8 °C) Oral 71 -- 98 %   10/03/22 1122 108/74 -- -- 72 -- --   10/03/22 1119 117/85 -- -- 87 -- --   10/03/22 1049 105/69 -- -- 71 -- --     Temp (24hrs), Av °F (36.7 °C), Min:97.5 °F (36.4 °C), Max:98.3 °F (36.8 °C)      Vital signs stable, afebrile. Exam:  Patient without distress. Abdomen soft, fundus firm at level of umbilicus, nontender               Labs: No results found for this or any previous visit (from the past 24 hour(s)). Assessment and Plan:  Patient appears to be having uncomplicated post-partum course. Continue routine perineal care and maternal education. Wants to go home.

## 2022-10-04 NOTE — DISCHARGE SUMMARY
Discharge Summary     Date of Admission:  10/3/2022  6:57 AM  Date of Discharge:  10/4/2022       Yasmin Torres 33 y.o. [id number] presented at 39w0d for induction/in active labor. Pt had  without incident. See delivery note for all delivery details. Pt's PP course was uneventful. On day of D/C, she was ambulating well, afebrile, with lochia < menses. She was discharged home with medications as below. Pt was breast feeding on discharge. She plans on unsure for birth control. HTN diagnosis: no   Routine PP instructions given to patient. She is to follow up with [facility] in 1-2 weeks for [facility] exam with Dr Delisa River. No problem-specific Assessment & Plan notes found for this encounter.           Medication List        START taking these medications      enoxaparin 40 MG/0.4ML  Commonly known as: LOVENOX  Inject 0.4 mLs into the skin daily  Start taking on: 2022     ibuprofen 800 MG tablet  Commonly known as: ADVIL;MOTRIN  Take 1 tablet by mouth every 8 hours as needed for Pain            CONTINUE taking these medications      acetaminophen 500 MG tablet  Commonly known as: TYLENOL     CALCIUM PO     PRENATAL 1 PO            STOP taking these medications      ferrous sulfate 325 (65 Fe) MG tablet  Commonly known as: IRON 861     FOLIC ACID PO     heparin (porcine) 78604 UNIT/ML injection               Where to Get Your Medications        These medications were sent to [address]      Phone: [phone]   enoxaparin 40 MG/0.4ML  ibuprofen 800 MG tablet         Joseph Bailey MD, MD  10:48 AM  10/04/22

## 2022-11-01 RX ORDER — IBUPROFEN 800 MG/1
800 TABLET ORAL EVERY 8 HOURS PRN
Qty: 90 TABLET | Refills: 0 | OUTPATIENT
Start: 2022-11-01

## 2022-11-14 ENCOUNTER — POSTPARTUM VISIT (OUTPATIENT)
Dept: OBGYN CLINIC | Age: 32
End: 2022-11-14

## 2022-11-14 VITALS
DIASTOLIC BLOOD PRESSURE: 60 MMHG | WEIGHT: 123 LBS | BODY MASS INDEX: 18.64 KG/M2 | SYSTOLIC BLOOD PRESSURE: 102 MMHG | HEIGHT: 68 IN

## 2022-11-14 PROCEDURE — 99902 PR PRENATAL VISIT: CPT | Performed by: OBSTETRICS & GYNECOLOGY

## 2022-11-14 NOTE — PROGRESS NOTES
Chief Complaint   Patient presents with    Postpartum Care     6 weeks (10/3/22) s/p , baby girl \"Deondre\", who is breast fed. Pt c/o vaginal soreness      Contraception     Pt is unsure     Subjective: This 33 y.o. female presents today for a post-partum visit after vaginal delivery. Delivery Method: Vaginal delivery  Status of Baby: Disposition of baby: Home. Baby is breast fed. Postpartum Contraception: none, condoms      History reviewed. No pertinent past medical history. No past surgical history on file. Social History     Socioeconomic History    Marital status:      Spouse name: Not on file    Number of children: Not on file    Years of education: Not on file    Highest education level: Not on file   Occupational History    Not on file   Tobacco Use    Smoking status: Never    Smokeless tobacco: Never   Substance and Sexual Activity    Alcohol use: Not on file    Drug use: Not on file    Sexual activity: Not on file   Other Topics Concern    Not on file   Social History Narrative    Not on file     Social Determinants of Health     Financial Resource Strain: Not on file   Food Insecurity: Not on file   Transportation Needs: Not on file   Physical Activity: Not on file   Stress: Not on file   Social Connections: Not on file   Intimate Partner Violence: Not on file   Housing Stability: Not on file     /60   Ht 5' 8\" (1.727 m)   Wt 123 lb (55.8 kg)   LMP 2022   Breastfeeding Yes   BMI 18.70 kg/m²      Review of Systems   Constitutional: Negative. HENT: Negative. Eyes: Negative. Respiratory: Negative. Cardiovascular: Negative. Gastrointestinal: Negative. Endocrine: Negative. Genitourinary: Negative. Musculoskeletal: Negative. Allergic/Immunologic: Negative. Neurological: Negative. Hematological: Negative. Psychiatric/Behavioral: Negative. Physical Exam  Vitals signs reviewed.    Constitutional:       General: She is not in acute distress. Appearance: Normal appearance. She is well-developed and normal weight. She is not ill-appearing, toxic-appearing or diaphoretic. HENT:      Head: Normocephalic and atraumatic. Nose: Nose normal.   Eyes:      General: No scleral icterus. Conjunctiva/sclera: Conjunctivae normal.      Pupils: Pupils are equal, round, and reactive to light. Neck:      Musculoskeletal: Normal range of motion and neck supple. Thyroid: No thyromegaly. Vascular: No JVD. Trachea: No tracheal deviation. Cardiovascular:      Rate and Rhythm: Normal rate and regular rhythm. Heart sounds: Normal heart sounds. No murmur. No friction rub. No gallop. Pulmonary:      Effort: Pulmonary effort is normal. No respiratory distress. Breath sounds: Normal breath sounds. No stridor. No wheezing, rhonchi or rales. Chest:      Chest wall: No tenderness. Breasts:         Right: No inverted nipple, mass, nipple discharge, skin change or tenderness. Left: No inverted nipple, mass, nipple discharge, skin change or tenderness. Abdominal:      General: Bowel sounds are normal. There is no distension. Palpations: Abdomen is soft. There is no mass. Tenderness: There is no abdominal tenderness. There is no guarding or rebound. Genitourinary:     Labia:         Right: No rash, tenderness, lesion or injury. Left: No rash, tenderness, lesion or injury. Vagina: Normal. No signs of injury and foreign body. No vaginal discharge, erythema, tenderness or bleeding. Cervix: No cervical motion tenderness, discharge or friability. Uterus: Not enlarged and not tender. Adnexa:         Right: No mass, tenderness or fullness. Left: No mass, tenderness or fullness. Comments: External genitalia are normal in appearance. Examination of urethral meatus reveals location normal and size normal.   Examination of urethra shows no abnormalities.    Examination of vaginal vault reveals no abnormalities. Cervix is long and closed without palpable pathology. Uterine portion of bimanual exam reveals contour normal, shape regular, descensus absent, no nodularity, no tenderness and size 6 weeks GA. Adnexa and parametria exam reveals no masses, nontender. Visual examination of anus and perineum shows no abnormalities. Musculoskeletal: Normal range of motion. General: No tenderness. Lymphadenopathy:      Cervical: No cervical adenopathy. Upper Body:      Right upper body: No supraclavicular adenopathy. Left upper body: No supraclavicular adenopathy. Lower Body: No right inguinal adenopathy. No left inguinal adenopathy. Skin:     General: Skin is warm and dry. Coloration: Skin is not pale. Findings: No erythema or rash. Neurological:      Mental Status: She is alert and oriented to person, place, and time. Cranial Nerves: No cranial nerve deficit. Motor: No abnormal muscle tone. Coordination: Coordination normal.   Psychiatric:         Behavior: Behavior normal.         Thought Content: Thought content normal.         Judgment: Judgment normal.      1. 6 weeks postpartum follow-up      Doing well pp. Denies mastitis, reviewed sx. Denies pp depression. Uncertain on contraception. Counseled at length. Recommend continuing prenatal vitamin when breastfeeding and especially if not using contraception or only using condoms, etc. Pt expressed understanding. Pt considering  getting vasectomy, paraguard, Mirena or Abigail Hunger or [place], or micronor. Advised her to let us know what she desires. Stopped her lovenox at 6wks, doing well w/o sx. Return if symptoms worsen or fail to improve, for Annual exam when due, prn for contraception management.

## 2022-11-22 ASSESSMENT — ENCOUNTER SYMPTOMS
RESPIRATORY NEGATIVE: 1
ALLERGIC/IMMUNOLOGIC NEGATIVE: 1
EYES NEGATIVE: 1
GASTROINTESTINAL NEGATIVE: 1

## 2022-11-25 ENCOUNTER — TELEPHONE (OUTPATIENT)
Dept: OBGYN CLINIC | Age: 32
End: 2022-11-25

## 2022-12-05 ENCOUNTER — TELEPHONE (OUTPATIENT)
Dept: OBGYN CLINIC | Age: 32
End: 2022-12-05

## 2022-12-06 ENCOUNTER — TELEPHONE (OUTPATIENT)
Dept: OBGYN CLINIC | Age: 32
End: 2022-12-06

## 2022-12-06 NOTE — TELEPHONE ENCOUNTER
Called patient to schedule GWS appointment and to give benefit information. Left message for patient to call back to schedule.

## 2022-12-14 ENCOUNTER — OFFICE VISIT (OUTPATIENT)
Dept: OBGYN CLINIC | Age: 32
End: 2022-12-14
Payer: COMMERCIAL

## 2022-12-14 VITALS — WEIGHT: 123 LBS | HEIGHT: 68 IN | BODY MASS INDEX: 18.64 KG/M2

## 2022-12-14 DIAGNOSIS — N61.0 MASTITIS, RIGHT, ACUTE: Primary | ICD-10-CM

## 2022-12-14 PROCEDURE — 99213 OFFICE O/P EST LOW 20 MIN: CPT | Performed by: OBSTETRICS & GYNECOLOGY

## 2022-12-14 RX ORDER — ERYTHROMYCIN 500 MG/1
500 TABLET, COATED ORAL 2 TIMES DAILY
Qty: 20 TABLET | Refills: 0 | Status: SHIPPED | OUTPATIENT
Start: 2022-12-14 | End: 2022-12-24

## 2022-12-14 NOTE — PROGRESS NOTES
Chief Complaint   Patient presents with    Other     Mastitis with breast pain. Pt was treated with antibiotics but right breast is  after treatment. Pt with continued pain s/p Tx for mastitis. Pt has PCN allergy (amoxil). LMP: No LMP recorded. Contraception: none  Social History     Substance and Sexual Activity   Sexual Activity Not on file       OB History:  OB History    Para Term  AB Living   3 3 3 0 0 3   SAB IAB Ectopic Molar Multiple Live Births   0 0 0 0 0 3      # Outcome Date GA Lbr Ari/2nd Weight Sex Delivery Anes PTL Lv   3 Term 10/03/22 39w0d 01:11  00:56 7 lb 12.9 oz (3.54 kg) F Vag-Spont EPI N ANNELISE      Name: Bea Lynch: 9  Apgar5: 9   2 Term 08/10/20 39w5d 16:40 / 01:46 7 lb 15 oz (3.6 kg) F Vag-Spont EPI N ANNELISE      Name: Elder De La Cruz: 7  Apgar5: 9   1 Term 18 39w2d / 03:38 7 lb 11.5 oz (3.5 kg) M Vag-Vacuum  N ANNELISE      Birth Comments: 3rd degree lac      Name: Sadie Petit: 9  Apgar5: 9       Allergies: Allergies   Allergen Reactions    Amoxicillin Hives       Medication History:  Current Outpatient Medications   Medication Sig Dispense Refill    ibuprofen (ADVIL;MOTRIN) 800 MG tablet Take 1 tablet by mouth every 8 hours as needed for Pain 90 tablet 0    Prenatal MV-Min-Fe Fum-FA-DHA (PRENATAL 1 PO) Take by mouth       No current facility-administered medications for this visit. Past Medical History:  History reviewed. No pertinent past medical history. Past Surgical History:  History reviewed. No pertinent surgical history.     Social History:  Social History     Socioeconomic History    Marital status:      Spouse name: Not on file    Number of children: Not on file    Years of education: Not on file    Highest education level: Not on file   Occupational History    Not on file   Tobacco Use    Smoking status: Never    Smokeless tobacco: Never   Substance and Sexual Activity Alcohol use: Not on file    Drug use: Not on file    Sexual activity: Not on file   Other Topics Concern    Not on file   Social History Narrative    Not on file     Social Determinants of Health     Financial Resource Strain: Not on file   Food Insecurity: Not on file   Transportation Needs: Not on file   Physical Activity: Not on file   Stress: Not on file   Social Connections: Not on file   Intimate Partner Violence: Not on file   Housing Stability: Not on file       Family History:  History reviewed. No pertinent family history. Ht 5' 8\" (1.727 m)   Wt 123 lb (55.8 kg)   BMI 18.70 kg/m²      Review of Systems   Constitutional: Negative. HENT: Negative. Eyes: Negative. Respiratory: Negative. Cardiovascular: Negative. Gastrointestinal: Negative. Endocrine: Negative. Genitourinary:         Breast pain   Musculoskeletal: Negative. Allergic/Immunologic: Negative. Neurological: Negative. Hematological: Negative. Psychiatric/Behavioral: Negative. Physical Exam  Vitals signs reviewed. Constitutional:       General: She is not in acute distress. Appearance: Normal appearance. She is well-developed and normal weight. She is not ill-appearing, toxic-appearing or diaphoretic. HENT:      Head: Normocephalic and atraumatic. Nose: Nose normal.   Eyes:      General: No scleral icterus. Conjunctiva/sclera: Conjunctivae normal.      Pupils: Pupils are equal, round, and reactive to light. Neck:      Musculoskeletal: Normal range of motion and neck supple. Thyroid: No thyromegaly. Vascular: No JVD. Trachea: No tracheal deviation. Cardiovascular:      Rate and Rhythm: Normal rate and regular rhythm. Heart sounds: Normal heart sounds. No murmur. No friction rub. No gallop. Pulmonary:      Effort: Pulmonary effort is normal. No respiratory distress. Breath sounds: Normal breath sounds. No stridor. No wheezing, rhonchi or rales.    Chest: Chest wall: No tenderness. Breasts:         Right: No inverted nipple, mass, nipple discharge, skin change. + tenderness on palpation but no discernible mass. Left: No inverted nipple, mass, nipple discharge, skin change or tenderness. Abdominal:      General: Bowel sounds are normal. There is no distension. Palpations: Abdomen is soft. There is no mass. Tenderness: There is no abdominal tenderness. There is no guarding or rebound. Musculoskeletal: Normal range of motion. General: No tenderness. Lymphadenopathy:      Cervical: No cervical adenopathy. Upper Body:      Right upper body: No supraclavicular adenopathy. Left upper body: No supraclavicular adenopathy. Skin:     General: Skin is warm and dry. Coloration: Skin is not pale. Findings: No erythema or rash. Neurological:      Mental Status: She is alert and oriented to person, place, and time. Cranial Nerves: No cranial nerve deficit. Motor: No abnormal muscle tone. Coordination: Coordination normal.   Psychiatric:         Behavior: Behavior normal.         Thought Content: Thought content normal.         Judgment: Judgment normal.        1. Mastitis, right, acute      Orders Placed This Encounter   Medications    erythromycin base (E-MYCIN) 500 MG tablet     Sig: Take 1 tablet by mouth 2 times daily for 10 days     Dispense:  20 tablet     Refill:  0     Discussed findings and will send in alternate antibiotic. Instructions for care and indications for follow up reviewed. For pain relief, use cold compresses or soak a cloth in warm water and place it on your breast. A warm shower or bath may also help. Acetaminophen or ibuprofen can also be used for relief of pain and fever (aspirin should be avoided). Rest and drink plenty of fluids. If you are breastfeeding, continue to breastfeed.  Start feeds with the sore breast first.  Express or pump milk from your breast between feeds. Massage your breast to clear any blockages - stroke from the lumpy or sore area towards your nipple to help the milk flow. Avoid tight-fitting clothes or bras.

## 2022-12-16 ENCOUNTER — TELEPHONE (OUTPATIENT)
Dept: OBGYN CLINIC | Age: 32
End: 2022-12-16

## 2022-12-28 ASSESSMENT — ENCOUNTER SYMPTOMS
GASTROINTESTINAL NEGATIVE: 1
EYES NEGATIVE: 1
ALLERGIC/IMMUNOLOGIC NEGATIVE: 1
RESPIRATORY NEGATIVE: 1

## 2023-02-17 DIAGNOSIS — Z86.2 HISTORY OF ANEMIA: ICD-10-CM

## 2023-02-17 DIAGNOSIS — O88.211 PULMONARY EMBOLISM AFFECTING PREGNANCY IN FIRST TRIMESTER: Primary | ICD-10-CM

## 2023-02-17 DIAGNOSIS — D50.9 IRON DEFICIENCY ANEMIA, UNSPECIFIED IRON DEFICIENCY ANEMIA TYPE: ICD-10-CM

## 2023-02-21 ENCOUNTER — HOSPITAL ENCOUNTER (OUTPATIENT)
Dept: LAB | Age: 33
Discharge: HOME | End: 2023-02-24
Payer: COMMERCIAL

## 2023-02-21 ENCOUNTER — OFFICE VISIT (OUTPATIENT)
Dept: ONCOLOGY | Age: 33
End: 2023-02-21
Payer: COMMERCIAL

## 2023-02-21 VITALS
SYSTOLIC BLOOD PRESSURE: 99 MMHG | TEMPERATURE: 98.6 F | HEIGHT: 68 IN | OXYGEN SATURATION: 98 % | BODY MASS INDEX: 17.82 KG/M2 | HEART RATE: 73 BPM | WEIGHT: 117.6 LBS | DIASTOLIC BLOOD PRESSURE: 67 MMHG | RESPIRATION RATE: 16 BRPM

## 2023-02-21 DIAGNOSIS — D50.9 IRON DEFICIENCY ANEMIA, UNSPECIFIED IRON DEFICIENCY ANEMIA TYPE: ICD-10-CM

## 2023-02-21 DIAGNOSIS — Z86.2 HISTORY OF ANEMIA: ICD-10-CM

## 2023-02-21 DIAGNOSIS — O88.211 PULMONARY EMBOLISM AFFECTING PREGNANCY IN FIRST TRIMESTER: ICD-10-CM

## 2023-02-21 DIAGNOSIS — O88.211 THROMBOEMBOLISM IN PREGNANCY, FIRST TRIMESTER: Primary | ICD-10-CM

## 2023-02-21 LAB
ABO + RH BLD: NORMAL
ALBUMIN SERPL-MCNC: 4.2 G/DL (ref 3.5–5)
ALBUMIN/GLOB SERPL: 1.3 (ref 0.4–1.6)
ALP SERPL-CCNC: 90 U/L (ref 50–136)
ALT SERPL-CCNC: 34 U/L (ref 12–65)
ANION GAP SERPL CALC-SCNC: 3 MMOL/L (ref 2–11)
APTT PPP: 32.2 SEC (ref 24.5–34.2)
AST SERPL-CCNC: 18 U/L (ref 15–37)
BASOPHILS # BLD: 0.1 K/UL (ref 0–0.2)
BASOPHILS NFR BLD: 1 % (ref 0–2)
BILIRUB SERPL-MCNC: 0.3 MG/DL (ref 0.2–1.1)
BUN SERPL-MCNC: 16 MG/DL (ref 6–23)
CALCIUM SERPL-MCNC: 9.9 MG/DL (ref 8.3–10.4)
CHLORIDE SERPL-SCNC: 109 MMOL/L (ref 101–110)
CO2 SERPL-SCNC: 29 MMOL/L (ref 21–32)
CREAT SERPL-MCNC: 0.7 MG/DL (ref 0.6–1)
DIFFERENTIAL METHOD BLD: NORMAL
EOSINOPHIL # BLD: 0.2 K/UL (ref 0–0.8)
EOSINOPHIL NFR BLD: 2 % (ref 0.5–7.8)
ERYTHROCYTE [DISTWIDTH] IN BLOOD BY AUTOMATED COUNT: 11.9 % (ref 11.9–14.6)
FERRITIN SERPL-MCNC: 58 NG/ML (ref 8–388)
FIBRINOGEN PPP-MCNC: 287 MG/DL (ref 190–501)
GLOBULIN SER CALC-MCNC: 3.3 G/DL (ref 2.8–4.5)
GLUCOSE SERPL-MCNC: 81 MG/DL (ref 65–100)
HCT VFR BLD AUTO: 43.4 % (ref 35.8–46.3)
HGB BLD-MCNC: 14.2 G/DL (ref 11.7–15.4)
HGB RETIC QN AUTO: 35 PG (ref 29–35)
IMM GRANULOCYTES # BLD AUTO: 0 K/UL (ref 0–0.5)
IMM GRANULOCYTES NFR BLD AUTO: 0 % (ref 0–5)
IMM RETICS NFR: 4.8 % (ref 3–15.9)
INR PPP: 0.9
IRON SATN MFR SERPL: 31 %
IRON SERPL-MCNC: 111 UG/DL (ref 35–150)
LYMPHOCYTES # BLD: 2.7 K/UL (ref 0.5–4.6)
LYMPHOCYTES NFR BLD: 38 % (ref 13–44)
MCH RBC QN AUTO: 30.1 PG (ref 26.1–32.9)
MCHC RBC AUTO-ENTMCNC: 32.7 G/DL (ref 31.4–35)
MCV RBC AUTO: 92.1 FL (ref 82–102)
MONOCYTES # BLD: 0.5 K/UL (ref 0.1–1.3)
MONOCYTES NFR BLD: 7 % (ref 4–12)
NEUTS SEG # BLD: 3.7 K/UL (ref 1.7–8.2)
NEUTS SEG NFR BLD: 52 % (ref 43–78)
NRBC # BLD: 0 K/UL (ref 0–0.2)
PLATELET # BLD AUTO: 286 K/UL (ref 150–450)
PMV BLD AUTO: 10 FL (ref 9.4–12.3)
POTASSIUM SERPL-SCNC: 4.1 MMOL/L (ref 3.5–5.1)
PROT SERPL-MCNC: 7.5 G/DL (ref 6.3–8.2)
PROTHROMBIN TIME: 12.9 SEC (ref 12.6–14.3)
RBC # BLD AUTO: 4.71 M/UL (ref 4.05–5.2)
RETICS # AUTO: 0.06 M/UL (ref 0.03–0.1)
RETICS/RBC NFR AUTO: 1.3 % (ref 0.3–2)
SODIUM SERPL-SCNC: 141 MMOL/L (ref 133–143)
THROMBIN TIME: 17.3 SECS (ref 15.4–17.9)
TIBC SERPL-MCNC: 361 UG/DL (ref 250–450)
WBC # BLD AUTO: 7.1 K/UL (ref 4.3–11.1)

## 2023-02-21 PROCEDURE — 85384 FIBRINOGEN ACTIVITY: CPT

## 2023-02-21 PROCEDURE — 80053 COMPREHEN METABOLIC PANEL: CPT

## 2023-02-21 PROCEDURE — 86901 BLOOD TYPING SEROLOGIC RH(D): CPT

## 2023-02-21 PROCEDURE — 82728 ASSAY OF FERRITIN: CPT

## 2023-02-21 PROCEDURE — 85610 PROTHROMBIN TIME: CPT

## 2023-02-21 PROCEDURE — 85025 COMPLETE CBC W/AUTO DIFF WBC: CPT

## 2023-02-21 PROCEDURE — 85670 THROMBIN TIME PLASMA: CPT

## 2023-02-21 PROCEDURE — 85730 THROMBOPLASTIN TIME PARTIAL: CPT

## 2023-02-21 PROCEDURE — 83540 ASSAY OF IRON: CPT

## 2023-02-21 PROCEDURE — 36415 COLL VENOUS BLD VENIPUNCTURE: CPT

## 2023-02-21 PROCEDURE — 85246 CLOT FACTOR VIII VW ANTIGEN: CPT

## 2023-02-21 PROCEDURE — 85046 RETICYTE/HGB CONCENTRATE: CPT

## 2023-02-21 PROCEDURE — 99215 OFFICE O/P EST HI 40 MIN: CPT | Performed by: PEDIATRICS

## 2023-02-21 ASSESSMENT — PATIENT HEALTH QUESTIONNAIRE - PHQ9
SUM OF ALL RESPONSES TO PHQ QUESTIONS 1-9: 0
SUM OF ALL RESPONSES TO PHQ QUESTIONS 1-9: 0
SUM OF ALL RESPONSES TO PHQ9 QUESTIONS 1 & 2: 0
SUM OF ALL RESPONSES TO PHQ QUESTIONS 1-9: 0
1. LITTLE INTEREST OR PLEASURE IN DOING THINGS: 0
2. FEELING DOWN, DEPRESSED OR HOPELESS: 0
SUM OF ALL RESPONSES TO PHQ QUESTIONS 1-9: 0

## 2023-02-21 NOTE — PROGRESS NOTES
Progress Notes by Lizet Randhaaw MD at 04/28/22 1000                Author: Lizet Randhawa MD  Service: --  Author Type: Physician      Filed: 04/28/22 1134  Encounter Date: 4/28/2022  Status: Signed         : Lizet Randhawa MD (Physician)                 HISTORY OF PRESENT ILLNESS   Viktor Bear is a 28 y.o.  female referred for PE in first trimester   Ms Patrick Everett is a very pleasant 33yo woman admitted on 3/13/2022 with primary diagnosis of PE affecting pregnancy in first trimester. She is about 9-10 weeks pregnant. Currently  on Lovenox for newly diagnosed PE. She noted some worsening shortness of breath over the last few days and was sent back to the emergency room. Original CT showed single segmental PE at right lung base on 3/9/2022. She reported no chest pain, hemoptysis,  fever, abdominal pain or vaginal bleeding. She is scheduled with GYN for first ultrasound soon. She has no history of prior VTE. She is a non-smoker. She did report having cold symptoms 3 days prior to developing shortness of breath. Covid was  negative when she presented to the ED. We are consulted for pulmonary embolism in pregnancy. 1. PE in pregnancy s/p CT and no suspicion for DVT in LE    -During today's visit we discussed the need to continue subcutaneous low molecular weight heparin. She is already on Lovenox and will continue this for the duration of pregnancy and most likely  for 6 weeks postpartum. Genetic testing can be completed anytime to determine if there is a hypercoag predisposition. Molecular testing can be checked 3 months postdelivery as many of the hypercoagulable testing can be affected by pregnancy itself. This was reviewed with the patient. If she gets discharged today, we can certainly complete this in an outpatient setting and she will be scheduled for a follow-up with me upon discharge.   She is aware that she will be seeing me at the cancer center  for hematology concerns. She reported no history of VTE, and no family history of thrombosis. Prior pregnancies without issues. HPI    First time PE in early 1st trimester   On lovenox 5omg q 12 hours   Expected bruising   No bleeding   Patient Denies:   Nose bleeds   Gum bleeds   Bruising or petechia   Bleeding with surgery   Bleeding with accidents   Transfusions   History or free bleeding or hemophilia      16 weeks no complications   PNV   Denies S&S of PENELOPE: no fatigue, pica, ha, syncope, SOB, chest pain, sleep issues or restless legs   Iron deficiency with 2nd pregnancy      Personal History   No DVT   No PE   No unexplained swelling   No recurrent chest pain or SOB   No severe headache   No pregnancy loss (when applicable)      Family History   No DVT   No PE   No unexplained swelling   No recurrent chest pain or SOB   No severe headache   No pregnancy loss (when applicable)      Risk Factors   Smoking   Y   N   Oral Contraceptives  Y   N   Obesity   Y   N   Inflammation   Y   N   Steroids   Y   N          Current Outpatient Medications        Medication  Sig           cholecalciferol, vitamin D3, (VITAMIN D3 PO)  Take  by mouth. (Patient not taking: Reported on 4/7/2022)       CALCIUM PO  Take  by mouth. (Patient not taking: Reported on 1/6/0566)       FOLIC ACID PO  Take  by mouth. (Patient not taking: Reported on 4/7/2022)       enoxaparin (Lovenox) 60 mg/0.6 mL injection  50 mg by SubCUTAneous route every twelve (12) hours for 270 days. PNV CB.77/FNTEPVX fum/folic ac (PRENATAL PO)  Take  by mouth. No current facility-administered medications for this visit.          Past Medical History:        Diagnosis  Date           12 weeks gestation of pregnancy  3/31/2022       Anemia affecting pregnancy  6/15/2020       Chronic migraine         Infertility, female            PCOS           Nuchal translucency of fetus on prenatal ultrasound  3/31/2022       PCOS (polycystic ovarian syndrome) Pulmonary embolus ([facility] [address]  03/14/2022          occurred on lovenox. In 1st trimester of pregnancy. Third degree perineal laceration  2/5/2020          PURA w/ Dr. Yeimy Azar         Past Surgical History:         Procedure  Laterality  Date            HX WISDOM TEETH EXTRACTION    02/2017        Family History         Problem  Relation  Age of Onset            Thyroid Disease  Mother        Migraines  Father        Alzheimer's Disease  Maternal Grandfather        Heart Disease  Maternal Grandfather        Lung Disease  Maternal Grandfather        Colon Cancer  Neg Hx        Ovarian Cancer  Neg Hx        Breast Cancer  Neg Hx        Diabetes  Neg Hx             Hypertension  Neg Hx          Social History         Tobacco Use           Smoking status:  Never Smoker       Smokeless tobacco:  Never Used       Substance Use Topics           Alcohol use:  No           Drug use:  Never         Immunization History        Administered  Date(s) Administered           Influenza Vaccine (Quad) Mdck Pf (>2 Yrs Flucelvax QUAD [id number])  12/26/2017       Influenza Vaccine [employer] NOW) PF (>6 Mo Flulaval, Fluarix, and >3 Yrs Afluria, Fluzone 20742)  11/12/2019           Tdap  05/14/2018         Allergies        Allergen  Reactions           Amoxicillin  Hives              ROS   Review of Systems    Constitutional: Negative. HENT: Negative. Eyes: Negative. Respiratory: Negative. Cardiovascular: Negative. Gastrointestinal: Negative. Genitourinary: Negative. Musculoskeletal: Negative. Skin: Negative. Neurological: Negative. Endo/Heme/Allergies: Negative. Psychiatric/Behavioral: Negative. Pain reviewed fully and addressed this visit   Med review and reconciliation addressed fully this visit   ADLs and performance level addressed, ECOG 0 unless otherwise addressed        Review of Systems   Constitutional: Negative. HENT: Negative. Eyes: Negative. Respiratory: Negative. Cardiovascular: Negative. Gastrointestinal: Negative. Genitourinary: Negative. Musculoskeletal: Negative. Skin: Negative. Neurological: Negative. Endo/Heme/Allergies: Negative. Psychiatric/Behavioral: Negative. Pain reviewed fully and addressed this visit  Med review and reconciliation addressed fully this visit  ADLs and performance level addressed, ECOG 0 unless otherwise addressed      Vitals:    02/21/23 0840   BP: 99/67   Pulse: 73   Resp: 16   Temp: 98.6 °F (37 °C)   SpO2: 98%           Physical Exam     Constitutional: Well developed, well nourished female in no acute distress, sitting comfortably, pregnant   HEENT: Normocephalic and atraumatic. Oropharynx is clear, mucous membranes are moist.  Pupils are equal, round, and reactive to light. Extraocular muscles are intact. Sclerae anicteric. Neck supple without JVD. No thyromegaly present. Lymph node   No palpable submandibular, cervical, supraclavicular, axillary or inguinal lymph nodes. Skin Warm and dry. No bruising and no rash noted. No erythema. No pallor. Respiratory Lungs are clear to auscultation bilaterally without wheezes, rales or rhonchi, normal air exchange without accessory muscle use. CVS Normal rate, regular rhythm and normal S1 and S2. No murmurs, gallops, or rubs. Abdomen Soft, nontender and nondistended, normoactive bowel sounds. No palpable mass. No hepatosplenomegaly. Neuro Grossly nonfocal with no obvious sensory or motor deficits. MSK Normal range of motion in general.  No edema and no tenderness. PS ECOG = 0   Psych Appropriate mood and affect. No results found for this or any previous visit (from the past 24 hour(s)).        Patient Active Problem List        Diagnosis  Code           High-risk pregnancy in first trimester  O09.91       History of anemia  Z86.2       Group beta Strep positive  B95.1       Migraine without aura with status migrainosus  G43.001 History of migraine during pregnancy  Z86.69, Z87.59       Pulmonary embolism affecting pregnancy in first trimester  O88.211           Hx of maternal laceration, 3rd degree, currently pregnant, first trimester  O09.291        Vaccination November, ? covid in January            ASSESSMENT and PLAN   First time PE precipitated by pregnancy in early trimester and no other transient risk factors or personal or family history to suggest thrombophilia but lab testing warranted   1) PE: continue lovenox 50mg q 12 hours through lat 3rd tri until 37 weeks, then transition to unfractionated heparin either 5000 or 75867 units q 12 hour and stop at induction/restart lovenox full dose for 6 weeks post partum   -bleeding risk with AC addressed   2) thrombophilia work up   -get FVL and PT gene mutation   -protein C,S, ATIII   -LA, beta2 and LIZA   -repeat DDIMER, FVIII   3) PENELOPE screening and replacement   All questions answered   Follow up 1 month and then prior to delivery   Plan is treatment through 6 weeks post, unless thrombophilia    22  Doing well  Tolerating lovenox  Denies new issues  Minor bruising and bleeding  -iron stores, low normal, start oral iron ferous sulfate 325mg 1 daily  Recheck iron stores in 12 weeks  Follow up 4 months post delivery      23  Doing well 4 months s/p , transitioned to unfractionated heparin and tolerated well, 6 weeks lovenox  No bleeding and no new symptoms  Reviewed labs, APS work up negative, FVL and PT negative  -PE in 1st tri with likely covid 2 weeks prior as precipitating events  -no further prophy needed unless high risk situations, pregnancy, international travel +/-, covid infection  -prevention techniques addressed  -follow up annually   Total time 45 min 50% in direct consultation about the patient's diagnosis and management   Stan Cornell MD   Director, Adolescent Young Adult [address] and Blood Disorders Program   [facility]. [facility], [facility].   [address] [facility], [address] [place] [address] Phone

## 2023-02-21 NOTE — PATIENT INSTRUCTIONS
Patient Instructions from Today's Visit    Reason for Visit:  Follow up  Hx Iron deficiency anemia (oral iron only. IV Iron denied and with product change pt did not want IV Venofer)   Hx Pulmonary Embolism early in pregnancy (2022)- currently off anticoagulation   Covid per pt prior to PE  S/p Delivery: Oct 2022   Currently has IUD: Feb 2023    Thrombosis work up: May 2022    Plan:    If you have future high risk situations in terms of risk of subsequent blood clots let us know as we may or may not recommend anticoagulation:   Pregnancy, hospitalization, prolonged travel [place] international, sedentary state, Covid +    If you get covid again, Dr Deedee Little will likely recommend prophylaxis anticoagulation for about 6 weeks. If you get a covid booster we would not recommend prophylactic anticoagulation. Your iron stores look good! Your hemoglobin is normal.     Risk factors for blood clots:  Main risk factors for blood clots: female, obesity, oral contraceptives (or large doses of estrogen), pregnancy, smoking, over 36years of age, elevated blood pressure (hypertension), dehydration, surgery, long travel, prolonged sedentary state, family hx, previous blood clot, genetic risk factors. Some of these risk factors cannot be changed, but some can be altered to help potentially prevent clotting. Keep weight healthy, stay hydrated, avoid long period of sitting during long travel or prolonged sedentary state, notify us if you have an upcoming surgery or upcoming long travel. We may decide to provide prophylactic anticoagulation. Signs/symptoms of blood clots: pain or swelling in an extremity, pain in chest/difficulty breathing, unexplained fever, unexplained cough, severe headache      Follow Up:   Annually     Recent Lab Results:  Hospital Outpatient Visit on 02/21/2023   Component Date Value Ref Range Status    Iron 02/21/2023 111  35 - 150 ug/dL Final    Comment: Known Interfering Substances section:  \"Iron values may be falsely elevated in  serum samples from patients with  anticoagulants (e.g., hemodialysis patients). \"  Limitations of Procedure section:  \"Turbidity resulting from precipitation of  fibrinogen in the serum of patients treated  with anticoagulants (e.g. hemodialysis  patients) may cause spuriously elevated  iron results. \"      TIBC 02/21/2023 361  250 - 450 ug/dL Final    TRANSFERRIN SATURATION 02/21/2023 31  >20 % Final    Reticulocyte Count,Automated 02/21/2023 1.3  0.3 - 2.0 % Final    Absolute Retic # 02/21/2023 0.0603  0.026 - 0.095 M/ul Final    Immature Retic Fraction 02/21/2023 4.8  3.0 - 15.9 % Final    Retic Hemoglobin conc. 02/21/2023 35  29 - 35 pg Final    Sodium 02/21/2023 141  133 - 143 mmol/L Final    Potassium 02/21/2023 4.1  3.5 - 5.1 mmol/L Final    Chloride 02/21/2023 109  101 - 110 mmol/L Final    CO2 02/21/2023 29  21 - 32 mmol/L Final    Anion Gap 02/21/2023 3  2 - 11 mmol/L Final    Glucose 02/21/2023 81  65 - 100 mg/dL Final    BUN 02/21/2023 16  6 - 23 MG/DL Final    Creatinine 02/21/2023 0.70  0.6 - 1.0 MG/DL Final    Est, Glom Filt Rate 02/21/2023 >60  >60 ml/min/1.73m2 Final    Comment:      Pediatric calculator link: Chloe.at. org/professionals/kdoqi/gfr_calculatorped       These results are not intended for use in patients <25years of age. eGFR results are calculated without a race factor using  the 2021 CKD-EPI equation. Careful clinical correlation is recommended, particularly when comparing to results calculated using previous equations. The CKD-EPI equation is less accurate in patients with extremes of muscle mass, extra-renal metabolism of creatinine, excessive creatine ingestion, or following therapy that affects renal tubular secretion.       Calcium 02/21/2023 9.9  8.3 - 10.4 MG/DL Final    Total Bilirubin 02/21/2023 0.3  0.2 - 1.1 MG/DL Final    ALT 02/21/2023 34  12 - 65 U/L Final    AST 02/21/2023 18  15 - 37 U/L Final    Alk Phosphatase 02/21/2023 90  50 - 136 U/L Final    Total Protein 02/21/2023 7.5  6.3 - 8.2 g/dL Final    Albumin 02/21/2023 4.2  3.5 - 5.0 g/dL Final    Globulin 02/21/2023 3.3  2.8 - 4.5 g/dL Final    Albumin/Globulin Ratio 02/21/2023 1.3  0.4 - 1.6   Final    WBC 02/21/2023 7.1  4.3 - 11.1 K/uL Final    RBC 02/21/2023 4.71  4.05 - 5.2 M/uL Final    Hemoglobin 02/21/2023 14.2  11.7 - 15.4 g/dL Final    Hematocrit 02/21/2023 43.4  35.8 - 46.3 % Final    MCV 02/21/2023 92.1  82.0 - 102.0 FL Final    MCH 02/21/2023 30.1  26.1 - 32.9 PG Final    MCHC 02/21/2023 32.7  31.4 - 35.0 g/dL Final    RDW 02/21/2023 11.9  11.9 - 14.6 % Final    Platelets 07/86/2186 286  150 - 450 K/uL Final    MPV 02/21/2023 10.0  9.4 - 12.3 FL Final    nRBC 02/21/2023 0.00  0.0 - 0.2 K/uL Final    **Note: Absolute NRBC parameter is now reported with Hemogram**    Seg Neutrophils 02/21/2023 52  43 - 78 % Final    Lymphocytes 02/21/2023 38  13 - 44 % Final    Monocytes 02/21/2023 7  4.0 - 12.0 % Final    Eosinophils % 02/21/2023 2  0.5 - 7.8 % Final    Basophils 02/21/2023 1  0.0 - 2.0 % Final    Immature Granulocytes 02/21/2023 0  0.0 - 5.0 % Final    Segs Absolute 02/21/2023 3.7  1.7 - 8.2 K/UL Final    Absolute Lymph # 02/21/2023 2.7  0.5 - 4.6 K/UL Final    Absolute Mono # 02/21/2023 0.5  0.1 - 1.3 K/UL Final    Absolute Eos # 02/21/2023 0.2  0.0 - 0.8 K/UL Final    Basophils Absolute 02/21/2023 0.1  0.0 - 0.2 K/UL Final    Absolute Immature Granulocyte 02/21/2023 0.0  0.0 - 0.5 K/UL Final    Differential Type 02/21/2023 AUTOMATED    Final    Factor VIII Activity 02/21/2023 PENDING  % Incomplete    VON WILLEBRAND FACTOR (VWF) AG, 08* 02/21/2023 PENDING  % Incomplete    VWF Activity 02/21/2023 PENDING  % Incomplete         Treatment Summary has been discussed and given to patient: NA        -------------------------------------------------------------------------------------------------------------------  Please call our office at [phone] if you have any  of the following symptoms:   Fever of 100.5 or greater  Chills  Shortness of breath  Swelling or pain in one leg    After office hours an answering service is available and will contact a provider for emergencies or if you are experiencing any of the above symptoms. Patient has My Chart. My Chart log in information explained on the after visit summary printout at the [facility].

## 2023-02-23 LAB
FACT VIII ACT/NOR PPP: 128 % (ref 56–140)
INTERPRETATION: NORMAL
VWF AG ACT/NOR PPP IA: 105 % (ref 50–200)
VWF:RCO ACT/NOR PPP PL AGG: 67 % (ref 50–200)

## 2023-06-05 SDOH — ECONOMIC STABILITY: HOUSING INSECURITY
IN THE LAST 12 MONTHS, WAS THERE A TIME WHEN YOU DID NOT HAVE A STEADY PLACE TO SLEEP OR SLEPT IN A SHELTER (INCLUDING NOW)?: NO

## 2023-06-05 SDOH — ECONOMIC STABILITY: FOOD INSECURITY: WITHIN THE PAST 12 MONTHS, YOU WORRIED THAT YOUR FOOD WOULD RUN OUT BEFORE YOU GOT MONEY TO BUY MORE.: NEVER TRUE

## 2023-06-05 SDOH — ECONOMIC STABILITY: INCOME INSECURITY: HOW HARD IS IT FOR YOU TO PAY FOR THE VERY BASICS LIKE FOOD, HOUSING, MEDICAL CARE, AND HEATING?: NOT HARD AT ALL

## 2023-06-05 SDOH — ECONOMIC STABILITY: TRANSPORTATION INSECURITY
IN THE PAST 12 MONTHS, HAS LACK OF TRANSPORTATION KEPT YOU FROM MEETINGS, WORK, OR FROM GETTING THINGS NEEDED FOR DAILY LIVING?: NO

## 2023-06-05 SDOH — ECONOMIC STABILITY: FOOD INSECURITY: WITHIN THE PAST 12 MONTHS, THE FOOD YOU BOUGHT JUST DIDN'T LAST AND YOU DIDN'T HAVE MONEY TO GET MORE.: NEVER TRUE

## 2023-06-08 ENCOUNTER — OFFICE VISIT (OUTPATIENT)
Dept: OBGYN CLINIC | Age: 33
End: 2023-06-08
Payer: COMMERCIAL

## 2023-06-08 VITALS
DIASTOLIC BLOOD PRESSURE: 60 MMHG | BODY MASS INDEX: 16.67 KG/M2 | SYSTOLIC BLOOD PRESSURE: 98 MMHG | HEIGHT: 68 IN | WEIGHT: 110 LBS

## 2023-06-08 DIAGNOSIS — Z01.419 WELL WOMAN EXAM WITH ROUTINE GYNECOLOGICAL EXAM: Primary | ICD-10-CM

## 2023-06-08 DIAGNOSIS — Z11.51 SCREENING FOR HUMAN PAPILLOMAVIRUS (HPV): ICD-10-CM

## 2023-06-08 DIAGNOSIS — Z12.4 SCREENING FOR CERVICAL CANCER: ICD-10-CM

## 2023-06-08 PROCEDURE — 99395 PREV VISIT EST AGE 18-39: CPT | Performed by: OBSTETRICS & GYNECOLOGY

## 2023-06-08 NOTE — PROGRESS NOTES
Patient presents today for   Chief Complaint   Patient presents with    Annual Exam       Reports regular menses  LMP: No LMP recorded. (Menstrual status: Breastfeeding). Contraception: condoms        Allergies   Allergen Reactions    Amoxicillin Hives     Current Outpatient Medications   Medication Sig Dispense Refill    Multiple Vitamin (MULTIVITAMIN PO) Take by mouth      LECITHIN PO Take by mouth       No current facility-administered medications for this visit. Past Medical History:   Diagnosis Date    History of pulmonary embolus (PE)      History reviewed. No pertinent surgical history. Social History     Socioeconomic History    Marital status:      Spouse name: Not on file    Number of children: Not on file    Years of education: Not on file    Highest education level: Not on file   Occupational History    Not on file   Tobacco Use    Smoking status: Never    Smokeless tobacco: Never   Vaping Use    Vaping Use: Never used   Substance and Sexual Activity    Alcohol use: Not Currently    Drug use: Never    Sexual activity: Yes     Partners: Male   Other Topics Concern    Not on file   Social History Narrative    Not on file     Social Determinants of Health     Financial Resource Strain: Not on file   Food Insecurity: Not on file   Transportation Needs: Not on file   Physical Activity: Not on file   Stress: Not on file   Social Connections: Not on file   Intimate Partner Violence: Not on file   Housing Stability: Not on file     Family History   Problem Relation Age of Onset    Pancreatic Cancer Father     Thyroid Disease Mother     Thyroid Disease Sister      BP 98/60   Ht 5' 8\" (1.727 m)   Wt 110 lb (49.9 kg)   Breastfeeding Yes   BMI 16.73 kg/m²      ROS:  Constitutional: Negative. HENT: Negative. Eyes: Negative. Respiratory: Negative. Cardiovascular: Negative. Gastrointestinal: Negative. Endocrine: Negative. Genitourinary: Negative. Musculoskeletal: Negative.

## 2024-02-16 DIAGNOSIS — Z86.2 HISTORY OF ANEMIA: Primary | ICD-10-CM

## 2024-02-21 ENCOUNTER — OFFICE VISIT (OUTPATIENT)
Dept: ONCOLOGY | Age: 34
End: 2024-02-21
Payer: COMMERCIAL

## 2024-02-21 ENCOUNTER — HOSPITAL ENCOUNTER (OUTPATIENT)
Dept: LAB | Age: 34
Discharge: HOME | End: 2024-02-24
Payer: COMMERCIAL

## 2024-02-21 VITALS
DIASTOLIC BLOOD PRESSURE: 62 MMHG | BODY MASS INDEX: 16.82 KG/M2 | WEIGHT: 111 LBS | HEART RATE: 94 BPM | TEMPERATURE: 97.7 F | OXYGEN SATURATION: 94 % | SYSTOLIC BLOOD PRESSURE: 89 MMHG | HEIGHT: 68 IN | RESPIRATION RATE: 16 BRPM

## 2024-02-21 DIAGNOSIS — Z86.2 HISTORY OF ANEMIA: ICD-10-CM

## 2024-02-21 DIAGNOSIS — D50.9 IRON DEFICIENCY ANEMIA, UNSPECIFIED IRON DEFICIENCY ANEMIA TYPE: Primary | ICD-10-CM

## 2024-02-21 LAB
ALBUMIN SERPL-MCNC: 3.8 G/DL (ref 3.5–5)
ALBUMIN/GLOB SERPL: 1.1 (ref 0.4–1.6)
ALP SERPL-CCNC: 61 U/L (ref 50–136)
ALT SERPL-CCNC: 23 U/L (ref 12–65)
ANION GAP SERPL CALC-SCNC: 5 MMOL/L (ref 2–11)
AST SERPL-CCNC: 12 U/L (ref 15–37)
BASOPHILS # BLD: 0 K/UL (ref 0–0.2)
BASOPHILS NFR BLD: 0 % (ref 0–2)
BILIRUB SERPL-MCNC: 0.5 MG/DL (ref 0.2–1.1)
BUN SERPL-MCNC: 12 MG/DL (ref 6–23)
CALCIUM SERPL-MCNC: 9.3 MG/DL (ref 8.3–10.4)
CHLORIDE SERPL-SCNC: 103 MMOL/L (ref 103–113)
CO2 SERPL-SCNC: 31 MMOL/L (ref 21–32)
CREAT SERPL-MCNC: 0.8 MG/DL (ref 0.6–1)
DIFFERENTIAL METHOD BLD: NORMAL
EOSINOPHIL # BLD: 0.1 K/UL (ref 0–0.8)
EOSINOPHIL NFR BLD: 1 % (ref 0.5–7.8)
ERYTHROCYTE [DISTWIDTH] IN BLOOD BY AUTOMATED COUNT: 12.2 % (ref 11.9–14.6)
GLOBULIN SER CALC-MCNC: 3.4 G/DL (ref 2.8–4.5)
GLUCOSE SERPL-MCNC: 68 MG/DL (ref 65–100)
HCT VFR BLD AUTO: 42.3 % (ref 35.8–46.3)
HGB BLD-MCNC: 13.9 G/DL (ref 11.7–15.4)
IMM GRANULOCYTES # BLD AUTO: 0 K/UL (ref 0–0.5)
IMM GRANULOCYTES NFR BLD AUTO: 0 % (ref 0–5)
LYMPHOCYTES # BLD: 2.3 K/UL (ref 0.5–4.6)
LYMPHOCYTES NFR BLD: 27 % (ref 13–44)
MCH RBC QN AUTO: 29.4 PG (ref 26.1–32.9)
MCHC RBC AUTO-ENTMCNC: 32.9 G/DL (ref 31.4–35)
MCV RBC AUTO: 89.4 FL (ref 82–102)
MONOCYTES # BLD: 0.5 K/UL (ref 0.1–1.3)
MONOCYTES NFR BLD: 6 % (ref 4–12)
NEUTS SEG # BLD: 5.8 K/UL (ref 1.7–8.2)
NEUTS SEG NFR BLD: 66 % (ref 43–78)
NRBC # BLD: 0 K/UL (ref 0–0.2)
PLATELET # BLD AUTO: 233 K/UL (ref 150–450)
PMV BLD AUTO: 10.2 FL (ref 9.4–12.3)
POTASSIUM SERPL-SCNC: 3.7 MMOL/L (ref 3.5–5.1)
PROT SERPL-MCNC: 7.2 G/DL (ref 6.3–8.2)
RBC # BLD AUTO: 4.73 M/UL (ref 4.05–5.2)
SODIUM SERPL-SCNC: 139 MMOL/L (ref 136–146)
WBC # BLD AUTO: 8.8 K/UL (ref 4.3–11.1)

## 2024-02-21 PROCEDURE — 85025 COMPLETE CBC W/AUTO DIFF WBC: CPT

## 2024-02-21 PROCEDURE — 80053 COMPREHEN METABOLIC PANEL: CPT

## 2024-02-21 PROCEDURE — 36415 COLL VENOUS BLD VENIPUNCTURE: CPT

## 2024-02-21 PROCEDURE — 99214 OFFICE O/P EST MOD 30 MIN: CPT | Performed by: PEDIATRICS

## 2024-02-21 ASSESSMENT — PATIENT HEALTH QUESTIONNAIRE - PHQ9
SUM OF ALL RESPONSES TO PHQ QUESTIONS 1-9: 0
1. LITTLE INTEREST OR PLEASURE IN DOING THINGS: 0
2. FEELING DOWN, DEPRESSED OR HOPELESS: 0
SUM OF ALL RESPONSES TO PHQ9 QUESTIONS 1 & 2: 0

## 2024-02-21 NOTE — PROGRESS NOTES
Progress Notes by Emma Torres MD at 04/28/22 1000                Author: Emma Torres MD  Service: --  Author Type: Physician      Filed: 04/28/22 1134  Encounter Date: 4/28/2022  Status: Signed         : Emma Torres MD (Physician)                 HISTORY OF PRESENT ILLNESS   Keila Martin is a 31 y.o.  female referred for PE in first trimester   Ms Martin is a very pleasant 32yo woman admitted on 3/13/2022 with primary diagnosis of PE affecting pregnancy in first trimester.  She is about 9-10 weeks pregnant.  Currently  on Lovenox for newly diagnosed PE.  She noted some worsening shortness of breath over the last few days and was sent back to the emergency room.  Original CT showed single segmental PE at right lung base on 3/9/2022.  She reported no chest pain, hemoptysis,  fever, abdominal pain or vaginal bleeding.  She is scheduled with GYN for first ultrasound soon.  She has no history of prior VTE.  She is a non-smoker.  She did report having cold symptoms 3 days prior to developing shortness of breath.  Covid was  negative when she presented to the ED.  We are consulted for pulmonary embolism in pregnancy.       1. PE in pregnancy s/p CT and no suspicion for DVT in LE    -During today's visit we discussed the need to continue subcutaneous low molecular weight heparin.  She is already on Lovenox and will continue this for the duration of pregnancy and most likely  for 6 weeks postpartum.  Genetic testing can be completed anytime to determine if there is a hypercoag predisposition.  Molecular testing can be checked 3 months postdelivery as many of the hypercoagulable testing can be affected by pregnancy itself.   This was reviewed with the patient.  If she gets discharged today, we can certainly complete this in an outpatient setting and she will be scheduled for a follow-up with me upon discharge.  She is aware that she will be seeing me at the cancer center  for

## 2024-07-16 SDOH — ECONOMIC STABILITY: FOOD INSECURITY: WITHIN THE PAST 12 MONTHS, THE FOOD YOU BOUGHT JUST DIDN'T LAST AND YOU DIDN'T HAVE MONEY TO GET MORE.: NEVER TRUE

## 2024-07-16 SDOH — ECONOMIC STABILITY: INCOME INSECURITY: HOW HARD IS IT FOR YOU TO PAY FOR THE VERY BASICS LIKE FOOD, HOUSING, MEDICAL CARE, AND HEATING?: NOT HARD AT ALL

## 2024-07-16 SDOH — ECONOMIC STABILITY: FOOD INSECURITY: WITHIN THE PAST 12 MONTHS, YOU WORRIED THAT YOUR FOOD WOULD RUN OUT BEFORE YOU GOT MONEY TO BUY MORE.: NEVER TRUE

## 2024-07-18 ENCOUNTER — OFFICE VISIT (OUTPATIENT)
Dept: OBGYN CLINIC | Age: 34
End: 2024-07-18
Payer: COMMERCIAL

## 2024-07-18 VITALS
SYSTOLIC BLOOD PRESSURE: 102 MMHG | DIASTOLIC BLOOD PRESSURE: 60 MMHG | WEIGHT: 106 LBS | BODY MASS INDEX: 16.06 KG/M2 | HEIGHT: 68 IN

## 2024-07-18 DIAGNOSIS — Z12.4 SCREENING FOR CERVICAL CANCER: ICD-10-CM

## 2024-07-18 DIAGNOSIS — Z11.51 SCREENING FOR HUMAN PAPILLOMAVIRUS (HPV): ICD-10-CM

## 2024-07-18 DIAGNOSIS — Z01.419 WELL WOMAN EXAM WITH ROUTINE GYNECOLOGICAL EXAM: Primary | ICD-10-CM

## 2024-07-18 PROCEDURE — 99395 PREV VISIT EST AGE 18-39: CPT | Performed by: OBSTETRICS & GYNECOLOGY

## 2024-07-18 PROCEDURE — 99459 PELVIC EXAMINATION: CPT | Performed by: OBSTETRICS & GYNECOLOGY

## 2024-07-18 NOTE — PROGRESS NOTES
Patient presents today for   Chief Complaint   Patient presents with    Annual Exam         LMP: Patient's last menstrual period was 06/22/2024.  Contraception: condoms        Allergies   Allergen Reactions    Amoxicillin Hives     Current Outpatient Medications   Medication Sig Dispense Refill    Multiple Vitamin (MULTIVITAMIN PO) Take by mouth       No current facility-administered medications for this visit.     Past Medical History:   Diagnosis Date    Anemia 2020    during pregnancy    Drug effect 2018    severe hives to amoxicillin    History of pulmonary embolus (PE)     Infertility, female 2018    due to PCOS    Migraine unknown    chronic     History reviewed. No pertinent surgical history.  Social History     Socioeconomic History    Marital status:      Spouse name: Not on file    Number of children: Not on file    Years of education: Not on file    Highest education level: Not on file   Occupational History    Not on file   Tobacco Use    Smoking status: Never    Smokeless tobacco: Never   Vaping Use    Vaping Use: Never used   Substance and Sexual Activity    Alcohol use: Never    Drug use: Never    Sexual activity: Yes     Partners: Male     Birth control/protection: Condom   Other Topics Concern    Not on file   Social History Narrative    Not on file     Social Determinants of Health     Financial Resource Strain: Not on file   Food Insecurity: Not on file (7/16/2024)   Transportation Needs: Not on file   Physical Activity: Not on file   Stress: Not on file   Social Connections: Not on file   Intimate Partner Violence: Not on file   Housing Stability: Not on file     Family History   Problem Relation Age of Onset    Thyroid Disease Mother     Pancreatic Cancer Father     Thyroid Disease Sister     Pancreatic Cancer Paternal Grandfather      /60   Ht 1.727 m (5' 8\")   Wt 48.1 kg (106 lb)   LMP 06/22/2024   BMI 16.12 kg/m²      ROS:  Constitutional: Negative.    HENT: Negative.

## 2024-07-26 LAB
COLLECTION METHOD: NORMAL
CYTOLOGIST CVX/VAG CYTO: NORMAL
CYTOLOGY CVX/VAG DOC THIN PREP: NORMAL
HPV APTIMA: NEGATIVE
HPV GENOTYPE REFLEX: NORMAL
Lab: NORMAL
PAP SOURCE: NORMAL
PATH REPORT.FINAL DX SPEC: NORMAL
STAT OF ADQ CVX/VAG CYTO-IMP: NORMAL

## 2024-12-19 NOTE — PATIENT INSTRUCTIONS
02/21/2024 4.73  4.05 - 5.2 M/uL Final    Hemoglobin 02/21/2024 13.9  11.7 - 15.4 g/dL Final    Hematocrit 02/21/2024 42.3  35.8 - 46.3 % Final    MCV 02/21/2024 89.4  82.0 - 102.0 FL Final    MCH 02/21/2024 29.4  26.1 - 32.9 PG Final    MCHC 02/21/2024 32.9  31.4 - 35.0 g/dL Final    RDW 02/21/2024 12.2  11.9 - 14.6 % Final    Platelets 02/21/2024 233  150 - 450 K/uL Final    MPV 02/21/2024 10.2  9.4 - 12.3 FL Final    nRBC 02/21/2024 0.00  0.0 - 0.2 K/uL Final    **Note: Absolute NRBC parameter is now reported with Hemogram**    Differential Type 02/21/2024 AUTOMATED    Final    Neutrophils % 02/21/2024 66  43 - 78 % Final    Lymphocytes % 02/21/2024 27  13 - 44 % Final    Monocytes % 02/21/2024 6  4.0 - 12.0 % Final    Eosinophils % 02/21/2024 1  0.5 - 7.8 % Final    Basophils % 02/21/2024 0  0.0 - 2.0 % Final    Immature Granulocytes 02/21/2024 0  0.0 - 5.0 % Final    Neutrophils Absolute 02/21/2024 5.8  1.7 - 8.2 K/UL Final    Lymphocytes Absolute 02/21/2024 2.3  0.5 - 4.6 K/UL Final    Monocytes Absolute 02/21/2024 0.5  0.1 - 1.3 K/UL Final    Eosinophils Absolute 02/21/2024 0.1  0.0 - 0.8 K/UL Final    Basophils Absolute 02/21/2024 0.0  0.0 - 0.2 K/UL Final    Absolute Immature Granulocyte 02/21/2024 0.0  0.0 - 0.5 K/UL Final         Treatment Summary has been discussed and given to patient: NA        -------------------------------------------------------------------------------------------------------------------  Please call our office at [phone] if you have any  of the following symptoms:   Fever of 100.5 or greater  Chills  Shortness of breath  Swelling or pain in one leg    After office hours an answering service is available and will contact a provider for emergencies or if you are experiencing any of the above symptoms.    Patient has My Chart.  My Chart log in information explained on the after visit summary printout at the check-out desk.    
Weakness

## 2025-03-13 ENCOUNTER — OFFICE VISIT (OUTPATIENT)
Dept: ONCOLOGY | Age: 35
End: 2025-03-13
Payer: COMMERCIAL

## 2025-03-13 VITALS
RESPIRATION RATE: 15 BRPM | HEART RATE: 69 BPM | HEIGHT: 68 IN | DIASTOLIC BLOOD PRESSURE: 53 MMHG | WEIGHT: 108.6 LBS | BODY MASS INDEX: 16.46 KG/M2 | OXYGEN SATURATION: 100 % | TEMPERATURE: 97.8 F | SYSTOLIC BLOOD PRESSURE: 86 MMHG

## 2025-03-13 DIAGNOSIS — O88.211 PULMONARY EMBOLISM AFFECTING PREGNANCY IN FIRST TRIMESTER: Primary | ICD-10-CM

## 2025-03-13 DIAGNOSIS — Z80.9: ICD-10-CM

## 2025-03-13 DIAGNOSIS — Z80.9 FAMILY HISTORY OF CANCER IN FATHER: ICD-10-CM

## 2025-03-13 PROCEDURE — 99214 OFFICE O/P EST MOD 30 MIN: CPT | Performed by: STUDENT IN AN ORGANIZED HEALTH CARE EDUCATION/TRAINING PROGRAM

## 2025-03-13 ASSESSMENT — PATIENT HEALTH QUESTIONNAIRE - PHQ9
SUM OF ALL RESPONSES TO PHQ QUESTIONS 1-9: 0
2. FEELING DOWN, DEPRESSED OR HOPELESS: NOT AT ALL
SUM OF ALL RESPONSES TO PHQ QUESTIONS 1-9: 0
1. LITTLE INTEREST OR PLEASURE IN DOING THINGS: NOT AT ALL

## 2025-03-13 NOTE — PROGRESS NOTES
done on 2/21/2024 showed normal CBC.    Patient mentioned that her father had pancreatic cancer when he was 50 and his father her paternal grandfather also had pancreatic cancer.  I offered her genetic testing but patient declined at this time.  I have mentioned that if in future if she changes her mind I would be happy to refer her to genetics.    Patient will need anticoagulation during her pregnancy if she decides to get pregnant again, she voiced understanding.    I will follow-up with her as needed.    All questions were asked and answered to the best of my ability.  Asked patient to call clinic for any questions or concerns prior to next visit.     In all, I spent 35 minutes in the care of Ms. Martin today, over 50% of which was in direct counseling and coordination of care.    Elements of this note have been dictated using speech recognition software. As a result, errors of speech recognition may have occurred.             Mirta Duran MD  [facility]  [address] [place] [address]  Office : [phone]  Fax : [phone]

## 2025-03-13 NOTE — PATIENT INSTRUCTIONS
Patient Information from Today's Visit    The members of your [facility] are listed below:    Physician Provider: Mirta Duran MD  Medical Oncologist  Advanced Practice Clinician: ROGER Souza  Registered Nurse: Amanda JARRETT RN  Navigator:   Medical Assistant: BERNIE Luther  : Bhavin  Supportive Care Services: Adelaide JACOBS LMSW    Diagnosis: PE during pregnancy      Follow Up Instructions:   Reviewed labs  Reviewed plan of care  Treatment Summary has been discussed and given to patient:        Please refer to After Visit Summary or MyChart for upcoming appointment information. Please call our office for rescheduling needs at least 24 hours before your scheduled appointment time.If you have any questions regarding your upcoming schedule please reach out to your care team through [employer] or call [phone].     Please notify your assigned Nurse Navigator of any unplanned hospital admissions or Emergency Room visits within 24 hours of discharge.    -------------------------------------------------------------------------------------------------------------------  Please call our office at [phone] if you have any  of the following symptoms:   Fever of 100.5 or greater  Chills  Shortness of breath  Swelling or pain in one leg    After office hours an answering service is available and will contact a provider for emergencies or if you are experiencing any of the above symptoms.    VU ECHEVERRIA RN

## 2025-07-20 SDOH — ECONOMIC STABILITY: INCOME INSECURITY: IN THE LAST 12 MONTHS, WAS THERE A TIME WHEN YOU WERE NOT ABLE TO PAY THE MORTGAGE OR RENT ON TIME?: NO

## 2025-07-20 SDOH — ECONOMIC STABILITY: FOOD INSECURITY: WITHIN THE PAST 12 MONTHS, THE FOOD YOU BOUGHT JUST DIDN'T LAST AND YOU DIDN'T HAVE MONEY TO GET MORE.: NEVER TRUE

## 2025-07-20 SDOH — ECONOMIC STABILITY: FOOD INSECURITY: WITHIN THE PAST 12 MONTHS, YOU WORRIED THAT YOUR FOOD WOULD RUN OUT BEFORE YOU GOT MONEY TO BUY MORE.: NEVER TRUE

## 2025-07-20 SDOH — ECONOMIC STABILITY: TRANSPORTATION INSECURITY
IN THE PAST 12 MONTHS, HAS THE LACK OF TRANSPORTATION KEPT YOU FROM MEDICAL APPOINTMENTS OR FROM GETTING MEDICATIONS?: NO

## 2025-07-21 ENCOUNTER — OFFICE VISIT (OUTPATIENT)
Dept: OBGYN CLINIC | Age: 35
End: 2025-07-21
Payer: COMMERCIAL

## 2025-07-21 VITALS
BODY MASS INDEX: 16.97 KG/M2 | SYSTOLIC BLOOD PRESSURE: 100 MMHG | DIASTOLIC BLOOD PRESSURE: 60 MMHG | WEIGHT: 112 LBS | HEIGHT: 68 IN

## 2025-07-21 DIAGNOSIS — Z01.419 WELL WOMAN EXAM WITH ROUTINE GYNECOLOGICAL EXAM: Primary | ICD-10-CM

## 2025-07-21 PROCEDURE — 99395 PREV VISIT EST AGE 18-39: CPT | Performed by: OBSTETRICS & GYNECOLOGY
